# Patient Record
Sex: MALE | Race: BLACK OR AFRICAN AMERICAN | Employment: OTHER | URBAN - METROPOLITAN AREA
[De-identification: names, ages, dates, MRNs, and addresses within clinical notes are randomized per-mention and may not be internally consistent; named-entity substitution may affect disease eponyms.]

---

## 2018-06-15 ENCOUNTER — OFFICE VISIT (OUTPATIENT)
Dept: HEMATOLOGY | Age: 68
End: 2018-06-15

## 2018-06-15 VITALS
OXYGEN SATURATION: 7 % | DIASTOLIC BLOOD PRESSURE: 82 MMHG | WEIGHT: 252 LBS | TEMPERATURE: 96.4 F | HEART RATE: 69 BPM | SYSTOLIC BLOOD PRESSURE: 135 MMHG

## 2018-06-15 DIAGNOSIS — B18.2 HEP C W/O COMA, CHRONIC (HCC): Primary | ICD-10-CM

## 2018-06-15 PROBLEM — I10 ESSENTIAL HYPERTENSION: Status: ACTIVE | Noted: 2018-06-15

## 2018-06-15 RX ORDER — ENALAPRIL MALEATE 5 MG/1
TABLET ORAL
Refills: 2 | COMMUNITY
Start: 2018-03-14 | End: 2021-08-03

## 2018-06-15 RX ORDER — CHLORTHALIDONE 25 MG/1
TABLET ORAL
Refills: 0 | COMMUNITY
Start: 2018-05-04 | End: 2021-08-03

## 2018-06-15 NOTE — PROGRESS NOTES
Chief Complaint   Patient presents with   174 [facility] Patient     Visit Vitals    /82 (BP 1 Location: Left arm, BP Patient Position: Sitting)    Pulse 69    Temp 96.4 °F (35.8 °C) (Tympanic)    Wt 252 lb (114.3 kg)    SpO2 (!) 7%     PHQ over the last two weeks 6/15/2018   Little interest or pleasure in doing things Not at all   Feeling down, depressed or hopeless Not at all   Total Score PHQ 2 0     Learning Assessment 6/15/2018   PRIMARY LEARNER Patient   BARRIERS PRIMARY LEARNER NONE   CO-LEARNER CAREGIVER No   PRIMARY LANGUAGE ENGLISH   LEARNER PREFERENCE PRIMARY LISTENING   ANSWERED BY patient   RELATIONSHIP SELF     Abuse Screening Questionnaire 6/15/2018   Do you ever feel afraid of your partner? N   Are you in a relationship with someone who physically or mentally threatens you? N   Is it safe for you to go home? Y     Fall Risk Assessment, last 12 mths 6/15/2018   Able to walk? Yes   Fall in past 12 months?  No

## 2018-06-15 NOTE — PROGRESS NOTES
70 Tatianna Marquez MD, [address], [place], [place]       Andrés Lopez, MAEVE Caballero, GREG Pina, [facility]   Reginald West, MAEVE Hernandez, MAEVE Arguello [facility] 136    at [address]    [address] [place] [address], [address] [facility], Jose Guadalupe [address] 22. [phone]    FAX: [address] [place] [address] Avenue    at [address], [address] [facility], [address]    [phone]    FAX: [phone]     Patient Care Team:  Glady Lesches, MD as PCP - General (Family Practice)  Problem List  Date Reviewed: 6/15/2018          Codes Class Noted    Chronic hepatitis C without hepatic coma [facility]) ICD-10-CM: B18.2  ICD-9-CM: 070.54  6/15/2018        Essential hypertension ICD-10-CM: I10  ICD-9-CM: 401.9  6/15/2018            The clinician listed above has asked me to see Serg Woodward in consultation regarding chronic HCV and its management. All medical records sent by the referring physicians were reviewed. The patient is a 76 y.o. Black male who was noted to have abnormalities in liver chemistries and subsequently tested positive for chronic HCV remotely. Risk factors for acquiring HCV are remote IV drug use. There was no history of acute icteric hepatitis at the time of these risk factors. Imaging of the liver was either not performed or the results are not available to me. An assessment of liver fibrosis with biopsy or elastography has not been performed. The patient has never received treatment for chronic HCV. The patient has no symptoms which can be attributed to the liver disorder. The patient has not experienced pain in the right side over the liver, yellowing of the eyes or skin or problems concentrating.      The patient completes all daily activities without any functional limitations. All of the issues listed in the Assessment and Plan were discussed with the patient. All questions were answered. The patient expressed a clear understanding of the above. 1901 [facility] 87 in 4 weeks for FibroScan and to review all data and determine the treatment plan. ASSESSMENT AND PLAN:    Chronic HCV   Chronic HCV of unclear severity. The most recent laboratory studies indicate the liver transaminases are elevated, alkaline phosphatase is normal, tests of hepatic synthetic and metabolic function are normal, total bilirubin is normal, albumin is normal and the platelet count is depressed. Based upon laboratory studies, the patient does not appear to have advanced liver disease or cirrhosis. Will perform laboratory testing to monitor liver function and degree of liver injury. This included BMP, hepatic panel, CBC with platelet count and INR. Will perform and/or review results of HCV viral load and HCV genotype to define the specific treatment and duration of treatment that will be required. Will perform serologic and virologic studies to assess for other causes of chronic liver disease. Will perform imaging of the liver with ultrasound. The need to perform an assessment of liver fibrosis was discussed with the patient. The FibroScan can assess liver fibrosis and determine if a patient has advanced fibrosis or cirrhosis without the need for liver biopsy. The FibroScan is currently available at [facility] [place]. This will be performed at the next office visit. The patient has not previously been treated for HCV. Discussed the treatment alternatives. The SVR/cure rate for HCV now exceeds 90% with just oral anti-viral therapy and no interferon injections or significant side effects for most patients with HCV. The specific treatment is dependent upon genotype, viral load and histology. Treatment of other medical problems in patients with chronic liver disease  The patient was directed to continue all current medications at the current dosages. There are no contraindications for the patient to take any medications that are necessary for treatment of other medical issues. The patient can take oral diabetic agents for treatment of diabetes and statins for treatment of hypercholesterolemia. This will not impact the patient's current liver disease. The patient does not consume alcohol daily. Normal doses of acetaminophen can be used for pain as needed. Normal doses of acetaminophen as recommended on the label are not hepatotoxic, even in patients with cirrhosis. Counseling for alcohol in patients with chronic liver disease  The patient has not consumed alcohol since 1990. Vaccinations   The need for vaccination against viral hepatitis A and B will be assessed with serologic and instituted as appropriate. Routine vaccinations against other bacterial and viral agents can be performed as indicated. Annual flu vaccination should be administered if indicated. Screening for Hepatocellular Carcinoma  HCC screening will be initiated if the patient is found to have cirrhosis. ALLERGIES  No Known Allergies    MEDICATIONS  Current Outpatient Prescriptions   Medication Sig    chlorthalidone (HYGROTEN) 25 mg tablet TK 1 T PO  QD    enalapril (VASOTEC) 5 mg tablet TK 1 T PO ONCE D     No current facility-administered medications for this visit. SYSTEM REVIEW NOT RELATED TO LIVER DISEASE OR REVIEWED ABOVE:  Constitution systems: Negative for fever, chills, weight gain, weight loss. Eyes: Negative for visual changes. ENT: Negative for sore throat, painful swallowing. Respiratory: Negative for cough, hemoptysis, SOB. Cardiology: Negative for chest pain, palpitations. GI:  Negative for constipation or diarrhea. : Negative for urinary frequency, dysuria, hematuria, nocturia. Skin: Negative for rash. Hematology: Negative for easy bruising, blood clots. Musculo-skeletal: Negative for back pain, muscle pain, weakness. Neurologic: Negative for headaches, dizziness, vertigo, memory problems not related to HE. Psychology: Negative for anxiety, depression. FAMILY HISTORY:  The father  of esophageal cancer. The mother has hypertension. There is no family history of liver disease. SOCIAL HISTORY:  The patient is . The patient has 3 children and grandchildren. The patient stopped using tobacco products in . The patient has previously consumed alcohol in excess. The patient has been abstinent from alcohol since . The patient retired from [employer] [address]:  Visit Vitals    /82 (BP 1 Location: Left arm, BP Patient Position: Sitting)    Pulse 69    Temp 96.4 °F (35.8 °C) (Tympanic)    Wt 252 lb (114.3 kg)    SpO2 (!) 7%     General: No acute distress. Eyes: Sclera anicteric. ENT: No oral lesions. Nodes: No adenopathy. Skin: No spider angiomata. No jaundice. No palmar erythema. Respiratory: Lungs clear to auscultation. Cardiovascular: Regular heart rate. No murmurs. No JVD. Abdomen: Soft non-tender. Liver size normal to percussion/palpation. Spleen not palpable. No obvious ascites. Extremities: No edema. No muscle wasting. No gross arthritic changes. Neurologic: Alert and oriented. Cranial nerves grossly intact. No asterixis. LABORATORY STUDIES:  From 10/2017  AST/ALT/ALP/T Bili/ALB: 53/53/74/0.9/3.9  WBC/HB/PLT/INR: 5.7/15.6/127  BUN/CREAT: 23/1.25    SEROLOGIES:  Not available or performed. Testing was performed today.     LIVER HISTOLOGY:  Not available or performed    ENDOSCOPIC PROCEDURES:  Not available or performed    RADIOLOGY:  Not available or performed    OTHER TESTING:  Not available or performed    Maximiliano Hall [facility]  Liver [facility], [place] [address] Dilip Giron 900 [facility] Jose Guadalupe Giron  22.  [phone]

## 2018-06-15 NOTE — MR AVS SNAPSHOT
1111 [facility] [phone] [address] [place] [address] 
[phone] Patient: Elma Ojeda. MRN:  JOSE [id number] Visit Information Date & Time Provider Department Dept. Phone Encounter #  
 6/15/2018  1:05 PM Adriel William, 3687 [facility] 219 [id number] Follow-up Instructions Return in about 4 weeks (around 7/13/2018) for FibroScan Adriel Echavarria. Upcoming Health Maintenance Date Due DTaP/Tdap/Td series (1 - Tdap) 1/28/1971 ZOSTER VACCINE AGE 60> 11/28/2009 FOBT Q 1 YEAR AGE 50-75 3/11/2010 GLAUCOMA SCREENING Q2Y 1/28/2015 Pneumococcal 65+ Low/Medium Risk (1 of 2 - PCV13) 1/28/2015 Influenza Age 5 to Adult 8/1/2018 Allergies as of 6/15/2018  Review Complete On: 6/15/2018 By: Beverly Snyder No Known Allergies Current Immunizations  Never Reviewed No immunizations on file. Not reviewed this visit You Were Diagnosed With   
  
 Codes Comments Hep C w/o coma, chronic (HCC)    -  Primary ICD-10-CM: B18.2 ICD-9-CM: 070.54 Vitals BP Pulse Temp Weight(growth percentile) SpO2 Smoking Status 135/82 (BP 1 Location: Left arm, BP Patient Position: Sitting) 69 96.4 °F (35.8 °C) (Tympanic) 252 lb (114.3 kg) (!) 7% Former Smoker Your Updated Medication List  
  
   
This list is accurate as of 6/15/18  1:26 PM.  Always use your most recent med list.  
  
  
  
  
 chlorthalidone 25 mg tablet Commonly known as:  HYGROTEN  
TK 1 T PO  QD  
  
 enalapril 5 mg tablet Commonly known as:  Leydi Guerrero TK 1 T PO ONCE D We Performed the Following CBC W/O DIFF [85579 CPT(R)] HCV RNA BY GRAHAM QL,RFLX TO QT [39569 CPT(R)] HEP A AB, TOTAL [id number] CPT(R)] HEP B SURFACE AB [id number] CPT(R)] HEP B SURFACE AG [id number] CPT(R)] HEP C GENOTYPE [90676 CPT(R)] HEPATIC FUNCTION PANEL [16000 CPT(R)] HEPATITIS B CORE AB, TOTAL [id number] CPT(R)] METABOLIC PANEL, BASIC [02073 CPT(R)] PROTHROMBIN TIME + INR [01606 CPT(R)] Follow-up Instructions Return in about 4 weeks (around 7/13/2018) for FibroScan [place]. To-Do List   
 06/27/2018 Imaging:  US ABD COMP   
  
 07/30/2018 1:30 PM  
  Appointment with Skylar Hsu. Sandra Gilliam NP at [place] [address] ([phone]) Introducing [facility]! [employer] introduces [employer] patient portal. Now you can access parts of your medical record, email your doctor's office, and request medication refills online. 1. In your internet browser, go to [email or web address] 2. Click on the First Time User? Click Here link in the Sign In box. You will see the New Member Sign Up page. 3. Enter your [employer] Access Code exactly as it appears below. You will not need to use this code after youve completed the sign-up process. If you do not sign up before the expiration date, you must request a new code. · [employer] Access Code: [email or web address] Expires: 9/13/2018  1:26 PM 
 
4. Enter the last four digits of your Social Security Number (xxxx) and Date of Birth (mm/dd/yyyy) as indicated and click Submit. You will be taken to the next sign-up page. 5. Create a [employer] ID. This will be your [employer] login ID and cannot be changed, so think of one that is secure and easy to remember. 6. Create a [employer] password. You can change your password at any time. 7. Enter your Password Reset Question and Answer. This can be used at a later time if you forget your password. 8. Enter your e-mail address. You will receive e-mail notification when new information is available in [address] 9. Click Sign Up. You can now view and download portions of your medical record. 10. Click the Download Summary menu link to download a portable copy of your medical information.  
 
If you have questions, please visit the Frequently Asked Questions section of the Medium. Remember, [employer] is NOT to be used for urgent needs. For medical emergencies, dial 911. Now available from your iPhone and Android! Please provide this summary of care documentation to your next provider. Your primary care clinician is listed as Napoleon Boyd. If you have any questions after today's visit, please call [phone].

## 2018-06-16 LAB
ALBUMIN SERPL-MCNC: 4 G/DL (ref 3.6–4.8)
ALP SERPL-CCNC: 72 IU/L (ref 39–117)
ALT SERPL-CCNC: 61 IU/L (ref 0–44)
AST SERPL-CCNC: 59 IU/L (ref 0–40)
BILIRUB DIRECT SERPL-MCNC: 0.39 MG/DL (ref 0–0.4)
BILIRUB SERPL-MCNC: 1 MG/DL (ref 0–1.2)
BUN SERPL-MCNC: 18 MG/DL (ref 8–27)
BUN/CREAT SERPL: 15 (ref 10–24)
CALCIUM SERPL-MCNC: 9.4 MG/DL (ref 8.6–10.2)
CHLORIDE SERPL-SCNC: 100 MMOL/L (ref 96–106)
CO2 SERPL-SCNC: 26 MMOL/L (ref 20–29)
CREAT SERPL-MCNC: 1.2 MG/DL (ref 0.76–1.27)
ERYTHROCYTE [DISTWIDTH] IN BLOOD BY AUTOMATED COUNT: 13.7 % (ref 12.3–15.4)
GFR SERPLBLD CREATININE-BSD FMLA CKD-EPI: 62 ML/MIN/1.73
GFR SERPLBLD CREATININE-BSD FMLA CKD-EPI: 71 ML/MIN/1.73
GLUCOSE SERPL-MCNC: 103 MG/DL (ref 65–99)
HAV AB SER QL IA: POSITIVE
HBV CORE AB SERPL QL IA: POSITIVE
HBV SURFACE AB SER QL: REACTIVE
HBV SURFACE AG SERPL QL IA: NEGATIVE
HCT VFR BLD AUTO: 44.9 % (ref 37.5–51)
HGB BLD-MCNC: 15.3 G/DL (ref 13–17.7)
INR PPP: 1.1 (ref 0.8–1.2)
MCH RBC QN AUTO: 30.3 PG (ref 26.6–33)
MCHC RBC AUTO-ENTMCNC: 34.1 G/DL (ref 31.5–35.7)
MCV RBC AUTO: 89 FL (ref 79–97)
PLATELET # BLD AUTO: 143 X10E3/UL (ref 150–379)
POTASSIUM SERPL-SCNC: 4 MMOL/L (ref 3.5–5.2)
PROT SERPL-MCNC: 7.9 G/DL (ref 6–8.5)
PROTHROMBIN TIME: 11.3 SEC (ref 9.1–12)
RBC # BLD AUTO: 5.05 X10E6/UL (ref 4.14–5.8)
SODIUM SERPL-SCNC: 140 MMOL/L (ref 134–144)
WBC # BLD AUTO: 5.6 X10E3/UL (ref 3.4–10.8)

## 2018-06-19 LAB
HCV GENTYP SERPL NAA+PROBE: NORMAL
HCV RNA SERPL NAA+PROBE-ACNC: NORMAL IU/ML
HCV RNA SERPL NAA+PROBE-LOG IU: 5.72 LOG10 IU/ML
HCV RNA SERPL QL NAA+PROBE: POSITIVE
PLEASE NOTE, 550474: NORMAL
TEST INFORMATION: NORMAL

## 2018-06-25 ENCOUNTER — TELEPHONE (OUTPATIENT)
Dept: HEMATOLOGY | Age: 68
End: 2018-06-25

## 2018-06-25 NOTE — TELEPHONE ENCOUNTER
Called the pt to r/s 7.30.18 appt no answer LVM for the pt to call back to r/s appt as soon as possible.  Letter sent

## 2018-07-05 ENCOUNTER — TELEPHONE (OUTPATIENT)
Dept: HEMATOLOGY | Age: 68
End: 2018-07-05

## 2018-07-05 NOTE — TELEPHONE ENCOUNTER
Called the pt to R/S appt 7.30.18 due to the provider being OOO.  No answer, LVM for the pt to adv in full detial

## 2018-07-25 ENCOUNTER — OFFICE VISIT (OUTPATIENT)
Dept: HEMATOLOGY | Age: 68
End: 2018-07-25

## 2018-07-25 VITALS
HEART RATE: 71 BPM | TEMPERATURE: 98 F | OXYGEN SATURATION: 99 % | WEIGHT: 259 LBS | SYSTOLIC BLOOD PRESSURE: 130 MMHG | DIASTOLIC BLOOD PRESSURE: 90 MMHG

## 2018-07-25 DIAGNOSIS — B18.2 CHRONIC HEPATITIS C WITHOUT HEPATIC COMA (HCC): Primary | ICD-10-CM

## 2018-07-25 PROBLEM — K74.69 OTHER CIRRHOSIS OF LIVER (HCC): Status: ACTIVE | Noted: 2018-07-25

## 2018-07-25 PROBLEM — K76.0 FATTY LIVER DISEASE, NONALCOHOLIC: Status: ACTIVE | Noted: 2018-07-25

## 2018-07-25 NOTE — PROGRESS NOTES
70 Tatianna Marquez MD, [address], [place], 5800 [facility]       Vielka Can, MAEVE Ndiaye, GREG Benjamin, [facility]   Juanito Sanchez, MAEVE Frankel, MAEVE Arguello [facility] 136    at [address], [address] [place]    1400 [facility], Jose Guadalupe  22.    [phone]    FAX: 126 [facility]    at [address], [address], [address]    [phone]    FAX: [phone]     Patient Care Team:  Oswaldo Cordero MD as PCP - General (Family Practice)  Problem List  Date Reviewed: 7/25/2018          Codes Class Noted    Other cirrhosis of liver [facility]) ICD-10-CM: K74.69  ICD-9-CM: 571.5  7/25/2018        Fatty liver disease, nonalcoholic FXC-31-KG: S34.9  ICD-9-CM: 571.8  7/25/2018        Chronic hepatitis C without hepatic coma ([facility] [address] ICD-10-CM: B18.2  ICD-9-CM: 070.54  6/15/2018        Essential hypertension ICD-10-CM: I10  ICD-9-CM: 401.9  6/15/2018            Tasha Gutiérrez. returns to the [address] for management of chronic HCV. The active problem list, all  pertinent past medical history, medications, liver histology and laboratory findings related to the liver disorder were reviewed with the patient. The patient is a 76 y.o. Black male who was noted to have abnormalities in liver chemistries and subsequently tested positive for chronic HCV remotely. Risk factors for acquiring HCV are remote IV drug use. There was no history of acute icteric hepatitis at the time of these risk factors. Imaging of the liver was either not performed or the results are not available to me. An assessment of liver fibrosis with elastography will be performed this visit. The patient has never received treatment for chronic HCV.       The patient has no symptoms which can be attributed to the liver disorder. The patient has not experienced pain in the right side over the liver, yellowing of the eyes or skin or problems concentrating. The patient completes all daily activities without any functional limitations. ASSESSMENT AND PLAN:    Chronic HCV   Chronic HCV with cirrhosis. He has not developed any complications of cirrhosis. The most recent laboratory studies indicate the liver transaminases are elevated, alkaline phosphatase is normal, tests of hepatic synthetic and metabolic function are normal, total bilirubin is normal, albumin is normal and the platelet count is depressed. The patient has GT 1A with viral load of 530,000. Will perform imaging of the liver with ultrasound. The patient has not previously been treated for HCV. Fatty liver  The patient was educated about the best diet to lose weight with fatty liver disease, one high in protein and low in carbohydrates. This was discussed in detail and a hand out was provided. Informed the patient this disease also can further injure his liver so it is important to lose weight. Treatment of other medical problems in patients with chronic liver disease  The patient was directed to continue all current medications at the current dosages. There are no contraindications for the patient to take any medications that are necessary for treatment of other medical issues. The patient can take oral diabetic agents for treatment of diabetes and statins for treatment of hypercholesterolemia. This will not impact the patient's current liver disease. The patient does not consume alcohol daily. Normal doses of acetaminophen can be used for pain as needed. Normal doses of acetaminophen as recommended on the label are not hepatotoxic, even in patients with cirrhosis.     Variceal screening  Will schedule for EGD to evaluate for esophageal varices and need to administer treatment to reduce risk of first variceal bleed. Counseling for alcohol in patients with chronic liver disease  The patient has not consumed alcohol since . Vaccinations   He does not need vaccination for hepatitis A and B. He has documented immunity. Routine vaccinations against other bacterial and viral agents can be performed as indicated. Annual flu vaccination should be administered if indicated. Screening for hepatocellular carcinoma  HCC screening will be started. Will order AFP at next visit when drawing other labs. Ultrasound is ordered. ALLERGIES  No Known Allergies    MEDICATIONS  Current Outpatient Prescriptions   Medication Sig    chlorthalidone (HYGROTEN) 25 mg tablet TK 1 T PO  QD    enalapril (VASOTEC) 5 mg tablet TK 1 T PO ONCE D     No current facility-administered medications for this visit. SYSTEM REVIEW NOT RELATED TO LIVER DISEASE OR REVIEWED ABOVE:  Constitution systems: Negative for fever, chills, weight gain, weight loss. Eyes: Negative for visual changes. ENT: Negative for sore throat, painful swallowing. Respiratory: Negative for cough, hemoptysis, SOB. Cardiology: Negative for chest pain, palpitations. GI:  Negative for constipation or diarrhea. : Negative for urinary frequency, dysuria, hematuria, nocturia. Skin: Negative for rash. Hematology: Negative for easy bruising, blood clots. Musculo-skeletal: Negative for back pain, muscle pain, weakness. Neurologic: Negative for headaches, dizziness, vertigo, memory problems not related to HE. Psychology: Negative for anxiety, depression. FAMILY HISTORY:  The father  of esophageal cancer. The mother has hypertension. There is no family history of liver disease. SOCIAL HISTORY:  The patient is . The patient has 3 children and grandchildren. The patient stopped using tobacco products in . The patient has previously consumed alcohol in excess.   The patient has been abstinent from alcohol since 1990. The patient retired from [address] [place] [address] Road:  Visit Vitals    /90 (BP 1 Location: Right arm, BP Patient Position: Sitting)    Pulse 71    Temp 98 °F (36.7 °C) (Tympanic)    Wt 259 lb (117.5 kg)    SpO2 99%     General: No acute distress. Obese. Eyes: Sclera anicteric. ENT: No oral lesions. Nodes: No adenopathy. Skin: No spider angiomata. No jaundice. No palmar erythema. Respiratory: Lungs clear to auscultation. Cardiovascular: Regular heart rate. No murmurs. No JVD. Abdomen: Soft non-tender. Liver size normal to percussion/palpation. Spleen not palpable. No obvious ascites. Extremities: No edema. No muscle wasting. No gross arthritic changes. Neurologic: Alert and oriented. Cranial nerves grossly intact. No asterixis. LABORATORY STUDIES:  From 10/2017  AST/ALT/ALP/T Bili/ALB: 53/53/74/0.9/3.9  WBC/HB/PLT/INR: 5.7/15.6/127  BUN/CREAT: 23/1.25    SEROLOGIES:  Serologies Latest Ref Rng & Units 6/15/2018   Hep A Ab, Total Negative Positive (A)   Hep B Surface Ag Negative Negative   Hep B Core Ab, Total Negative Positive (A)   Hep B Surface AB QL  Reactive   Hep C Genotype  1a   HCV RT-PCR, Quant IU/mL [id number]     LIVER HISTOLOGY:  7/2018. FibroScan performed at [address]. EkPa was 66.3. IQR/med 18%. The results suggested a fibrosis level of F4. CAP was 400, consistent with fatty liver disease. ENDOSCOPIC PROCEDURES:  Not available or performed    RADIOLOGY:  Not available or performed    OTHER TESTING:  Not available or performed    FOLLOW UP  All of the issues listed in the assessment and plan were discussed with the patient. All questions were answered. The patient expressed a clear understanding of the above. 1901 [facility] 87 4 weeks after starting medication.  The patient was told to call the office when he gets the medication to give us his start date and to schedule his 4 week follow up appointment.     JANN Hubbard-BC  Liver [place] [facility] [address] [place], [address] [address] Gu[address]upe [address]  22.  [phone]

## 2018-07-25 NOTE — PROGRESS NOTES
1. Have you been to the ER, urgent care clinic since your last visit? Hospitalized since your last visit? No    2. Have you seen or consulted any other health care providers outside of the [address] [place] [address] since your last visit? Include any pap smears or colon screening. No   Chief Complaint   Patient presents with    Follow-up     Fibroscan     Visit Vitals    /90 (BP 1 Location: Right arm, BP Patient Position: Sitting)    Pulse 71    Temp 98 °F (36.7 °C) (Tympanic)    Wt 259 lb (117.5 kg)    SpO2 99%     PHQ over the last two weeks 7/25/2018   Little interest or pleasure in doing things Not at all   Feeling down, depressed, irritable, or hopeless Not at all   Total Score PHQ 2 0     Learning Assessment 7/25/2018   PRIMARY LEARNER Patient   BARRIERS PRIMARY LEARNER NONE   CO-LEARNER CAREGIVER No   PRIMARY LANGUAGE ENGLISH   LEARNER PREFERENCE PRIMARY LISTENING   ANSWERED BY patient   RELATIONSHIP SELF     Abuse Screening Questionnaire 7/25/2018   Do you ever feel afraid of your partner? N   Are you in a relationship with someone who physically or mentally threatens you? N   Is it safe for you to go home? Y     Fall Risk Assessment, last 12 mths 7/25/2018   Able to walk? Yes   Fall in past 12 months?  No

## 2018-07-25 NOTE — MR AVS SNAPSHOT
1111 [facility] [phone] [address] [place] [address] 
[phone] Patient: Jazmin Lechuga. MRN:  [id number] Visit Information Date & Time Provider Department Dept. Phone Encounter #  
 7/25/2018  9:00 AM Kasia MEADOWS [facility], [facility] 219 [id number] Your Appointments 8/30/2018  2:00 PM  
PROCEDURE with MD Sonal Grover 75 ([address]) Appt Note: EGD  
 [address] [place] [phone] [facility] [address]  
59 [facility] [place] [address] Upcoming Health Maintenance Date Due DTaP/Tdap/Td series (1 - Tdap) 1/28/1971 ZOSTER VACCINE AGE 60> 11/28/2009 FOBT Q 1 YEAR AGE 50-75 3/11/2010 GLAUCOMA SCREENING Q2Y 1/28/2015 Pneumococcal 65+ Low/Medium Risk (1 of 2 - PCV13) 1/28/2015 MEDICARE YEARLY EXAM 6/15/2018 Influenza Age 5 to Adult 8/1/2018 Allergies as of 7/25/2018  Review Complete On: 7/25/2018 By: Dany Santiago No Known Allergies Current Immunizations  Never Reviewed No immunizations on file. Not reviewed this visit Vitals BP Pulse Temp Weight(growth percentile) SpO2 Smoking Status 130/90 (BP 1 Location: Right arm, BP Patient Position: Sitting) 71 98 °F (36.7 °C) (Tympanic) 259 lb (117.5 kg) 99% Former Smoker Your Updated Medication List  
  
   
This list is accurate as of 7/25/18  9:26 AM.  Always use your most recent med list.  
  
  
  
  
 chlorthalidone 25 mg tablet Commonly known as:  HYGROTEN  
TK 1 T PO  QD  
  
 enalapril 5 mg tablet Commonly known as:  Tonna George TK 1 T PO ONCE D To-Do List   
 08/06/2018 10:30 AM  
  Appointment with [facility] 2 at [facility] ([phone]) General  NPO DIET RESTICTIONS Please be NPO (nothing by mouth) for 6- 8 hours prior to procedure. GENERAL INSTRUCTIONS 1.  Bring any [facility] facility films/reports pertaining to the area being studied with you on the day of appointment. 2. A written order with a valid diagnosis and Physicians signature is required for all scheduled tests. 3. Check in at registration 30 minutes before your appointment time unless you were instructed to do otherwise. Introducing [facility] & HEALTH SERVICES! [employer] introduces [email or web address] patient portal. Now you can access parts of your medical record, email your doctor's office, and request medication refills online. 1. In your internet browser, go to [email or web address] 2. Click on the First Time User? Click Here link in the Sign In box. You will see the New Member Sign Up page. 3. Enter your [email or web address] Access Code exactly as it appears below. You will not need to use this code after youve completed the sign-up process. If you do not sign up before the expiration date, you must request a new code. · [email or web address] Access Code: [email or web address] Expires: 9/13/2018  1:26 PM 
 
4. Enter the last four digits of your Social Security Number (xxxx) and Date of Birth (mm/dd/yyyy) as indicated and click Submit. You will be taken to the next sign-up page. 5. Create a [email or web address] ID. This will be your [email or web address] login ID and cannot be changed, so think of one that is secure and easy to remember. 6. Create a [email or web address] password. You can change your password at any time. 7. Enter your Password Reset Question and Answer. This can be used at a later time if you forget your password. 8. Enter your e-mail address. You will receive e-mail notification when new information is available in [address] 9. Click Sign Up. You can now view and download portions of your medical record. 10. Click the Download Summary menu link to download a portable copy of your medical information. If you have questions, please visit the Frequently Asked Questions section of the [email or web address] website.  Remember, [email or web address] is NOT to be used for urgent needs. For medical emergencies, dial 911. Now available from your iPhone and Android! Please provide this summary of care documentation to your next provider. Your primary care clinician is listed as Fátima Solo. If you have any questions after today's visit, please call [phone].

## 2018-08-06 ENCOUNTER — HOSPITAL ENCOUNTER (OUTPATIENT)
Dept: ULTRASOUND IMAGING | Age: 68
Discharge: HOME | End: 2018-08-06
Attending: NURSE PRACTITIONER
Payer: MEDICARE

## 2018-08-06 DIAGNOSIS — B18.2 HEP C W/O COMA, CHRONIC (HCC): ICD-10-CM

## 2018-08-06 PROCEDURE — 76700 US EXAM ABDOM COMPLETE: CPT

## 2018-08-09 DIAGNOSIS — B18.2 CHRONIC HEPATITIS C WITHOUT HEPATIC COMA (HCC): Primary | ICD-10-CM

## 2018-08-09 RX ORDER — LEDIPASVIR AND SOFOSBUVIR 90; 400 MG/1; MG/1
1 TABLET, FILM COATED ORAL DAILY
Qty: 28 TAB | Refills: 2 | Status: SHIPPED | OUTPATIENT
Start: 2018-08-09 | End: 2019-01-10 | Stop reason: ALTCHOICE

## 2018-08-29 ENCOUNTER — ANESTHESIA EVENT (OUTPATIENT)
Dept: ENDOSCOPY | Age: 68
End: 2018-08-29
Payer: MEDICARE

## 2018-08-29 NOTE — ANESTHESIA PREPROCEDURE EVALUATION
Anesthetic History No history of anesthetic complications Review of Systems / Medical History Patient summary reviewed, nursing notes reviewed and pertinent labs reviewed Pulmonary Within defined limits Neuro/Psych Within defined limits Cardiovascular Hypertension: well controlled GI/Hepatic/Renal 
  
 
Hepatitis: type C Endo/Other Within defined limits Other Findings Physical Exam 
 
Airway Mallampati: II 
TM Distance: > 6 cm Neck ROM: normal range of motion Mouth opening: Normal 
 
 Cardiovascular Regular rate and rhythm,  S1 and S2 normal,  no murmur, click, rub, or gallop Dental 
 
Dentition: Full upper dentures and Full lower dentures Pulmonary Breath sounds clear to auscultation Abdominal 
GI exam deferred Other Findings Anesthetic Plan ASA: 3 Anesthesia type: MAC Induction: Intravenous Anesthetic plan and risks discussed with: Patient

## 2018-08-30 ENCOUNTER — ANESTHESIA (OUTPATIENT)
Dept: ENDOSCOPY | Age: 68
End: 2018-08-30
Payer: MEDICARE

## 2018-08-30 ENCOUNTER — HOSPITAL ENCOUNTER (OUTPATIENT)
Age: 68
Setting detail: OUTPATIENT SURGERY
Discharge: HOME | End: 2018-08-30
Attending: INTERNAL MEDICINE | Admitting: INTERNAL MEDICINE
Payer: MEDICARE

## 2018-08-30 VITALS
HEIGHT: 71 IN | OXYGEN SATURATION: 96 % | HEART RATE: 64 BPM | BODY MASS INDEX: 35.98 KG/M2 | TEMPERATURE: 98.5 F | DIASTOLIC BLOOD PRESSURE: 75 MMHG | RESPIRATION RATE: 14 BRPM | WEIGHT: 257 LBS | SYSTOLIC BLOOD PRESSURE: 129 MMHG

## 2018-08-30 PROCEDURE — 74011250636 HC RX REV CODE- 250/636

## 2018-08-30 PROCEDURE — 88305 TISSUE EXAM BY PATHOLOGIST: CPT | Performed by: INTERNAL MEDICINE

## 2018-08-30 PROCEDURE — 76060000033 HC ANESTHESIA 1 TO 1.5 HR: Performed by: INTERNAL MEDICINE

## 2018-08-30 PROCEDURE — 77030009426 HC FCPS BIOP ENDOSC BSC -B: Performed by: INTERNAL MEDICINE

## 2018-08-30 PROCEDURE — 76040000008: Performed by: INTERNAL MEDICINE

## 2018-08-30 RX ORDER — NALOXONE HYDROCHLORIDE 0.4 MG/ML
0.4 INJECTION, SOLUTION INTRAMUSCULAR; INTRAVENOUS; SUBCUTANEOUS
Status: DISCONTINUED | OUTPATIENT
Start: 2018-08-30 | End: 2018-08-30 | Stop reason: HOSPADM

## 2018-08-30 RX ORDER — SODIUM CHLORIDE 0.9 % (FLUSH) 0.9 %
5-10 SYRINGE (ML) INJECTION EVERY 8 HOURS
Status: DISCONTINUED | OUTPATIENT
Start: 2018-08-30 | End: 2018-08-30 | Stop reason: HOSPADM

## 2018-08-30 RX ORDER — DEXTROMETHORPHAN/PSEUDOEPHED 2.5-7.5/.8
1.2 DROPS ORAL
Status: DISCONTINUED | OUTPATIENT
Start: 2018-08-30 | End: 2018-08-30 | Stop reason: HOSPADM

## 2018-08-30 RX ORDER — SODIUM CHLORIDE 9 MG/ML
50 INJECTION, SOLUTION INTRAVENOUS CONTINUOUS
Status: DISCONTINUED | OUTPATIENT
Start: 2018-08-30 | End: 2018-08-30 | Stop reason: HOSPADM

## 2018-08-30 RX ORDER — FENTANYL CITRATE 50 UG/ML
50-200 INJECTION, SOLUTION INTRAMUSCULAR; INTRAVENOUS
Status: DISCONTINUED | OUTPATIENT
Start: 2018-08-30 | End: 2018-08-30 | Stop reason: HOSPADM

## 2018-08-30 RX ORDER — LIDOCAINE HYDROCHLORIDE 20 MG/ML
INJECTION, SOLUTION EPIDURAL; INFILTRATION; INTRACAUDAL; PERINEURAL AS NEEDED
Status: DISCONTINUED | OUTPATIENT
Start: 2018-08-30 | End: 2018-08-30 | Stop reason: HOSPADM

## 2018-08-30 RX ORDER — PROPOFOL 10 MG/ML
INJECTION, EMULSION INTRAVENOUS AS NEEDED
Status: DISCONTINUED | OUTPATIENT
Start: 2018-08-30 | End: 2018-08-30 | Stop reason: HOSPADM

## 2018-08-30 RX ORDER — ATROPINE SULFATE 0.1 MG/ML
0.5 INJECTION INTRAVENOUS
Status: DISCONTINUED | OUTPATIENT
Start: 2018-08-30 | End: 2018-08-30 | Stop reason: HOSPADM

## 2018-08-30 RX ORDER — MIDAZOLAM HYDROCHLORIDE 1 MG/ML
5-10 INJECTION, SOLUTION INTRAMUSCULAR; INTRAVENOUS
Status: DISCONTINUED | OUTPATIENT
Start: 2018-08-30 | End: 2018-08-30 | Stop reason: HOSPADM

## 2018-08-30 RX ORDER — EPINEPHRINE 0.1 MG/ML
1 INJECTION INTRACARDIAC; INTRAVENOUS
Status: DISCONTINUED | OUTPATIENT
Start: 2018-08-30 | End: 2018-08-30 | Stop reason: HOSPADM

## 2018-08-30 RX ORDER — FLUMAZENIL 0.1 MG/ML
0.2 INJECTION INTRAVENOUS
Status: DISCONTINUED | OUTPATIENT
Start: 2018-08-30 | End: 2018-08-30 | Stop reason: HOSPADM

## 2018-08-30 RX ORDER — SODIUM CHLORIDE 0.9 % (FLUSH) 0.9 %
5-10 SYRINGE (ML) INJECTION AS NEEDED
Status: DISCONTINUED | OUTPATIENT
Start: 2018-08-30 | End: 2018-08-30 | Stop reason: HOSPADM

## 2018-08-30 RX ORDER — SODIUM CHLORIDE 9 MG/ML
INJECTION, SOLUTION INTRAVENOUS
Status: DISCONTINUED | OUTPATIENT
Start: 2018-08-30 | End: 2018-08-30 | Stop reason: HOSPADM

## 2018-08-30 RX ADMIN — SODIUM CHLORIDE: 9 INJECTION, SOLUTION INTRAVENOUS at 14:46

## 2018-08-30 RX ADMIN — PROPOFOL 150 MG: 10 INJECTION, EMULSION INTRAVENOUS at 14:48

## 2018-08-30 RX ADMIN — PROPOFOL 50 MG: 10 INJECTION, EMULSION INTRAVENOUS at 14:51

## 2018-08-30 RX ADMIN — LIDOCAINE HYDROCHLORIDE 40 MG: 20 INJECTION, SOLUTION EPIDURAL; INFILTRATION; INTRACAUDAL; PERINEURAL at 14:48

## 2018-08-30 NOTE — IP AVS SNAPSHOT
2700 [facility] [address] [place] [address] 
[phone] Patient: Jazmin Lechuga. MRN: [id number] DBS:9/24/0555 About your hospitalization You were admitted on:  August 30, 2018 You last received care in the:  [facility] ENDOSCOPY You were discharged on:  August 30, 2018 Why you were hospitalized Your primary diagnosis was:  Not on File Follow-up Information Follow up With Details Comments Contact Info Abdon Davis MD   2 [facility] [address] [facility] [address] 
[phone] Discharge Orders None A check dima indicates which time of day the medication should be taken. My Medications CONTINUE taking these medications Instructions Each Dose to Equal  
 Morning Noon Evening Bedtime  
 chlorthalidone 25 mg tablet Commonly known as:  Daja Carry Your last dose was: Your next dose is:    
   
   
 TK 1 T PO  QD  
     
   
   
   
  
 enalapril 5 mg tablet Commonly known as:  Andres Vazquez Your last dose was: Your next dose is:    
   
   
 TK 1 T PO ONCE D  
     
   
   
   
  
 ledipasvir-sofosbuvir  mg Tab Commonly known as:  Bhavani Aparicio Your last dose was: Your next dose is: Take 1 Tab by mouth daily. Indications: CHRONIC HEPATITIS C - GENOTYPE 1, A-A, naive, compensated cirrhotic (CPS 5, class A) 1 Tab Discharge Instructions [address] 8579 [facility] Melo Treviño MD, Babita Reed, [facility] MAEVE Hardwick PA-C [address]   MAEVE Simon NP [address] [facility] [place] 136 
  at [address], [address] [facility], [facility] 22. 
  [phone] FAX: [address] [place] [address] 
  [address], [address] 98 Angely Mckoy, [address] 
  [phone] FAX: [phone] ENDOSCOPY DISCHARGE INSTRUCTIONS Susie Johnsonmarianoavni. 
1950 Date: 8/30/2018 DISCOMFORT: 
Use lozenges or warm salt water gargle for sore thoat Apply warm compress to IV site if red. If redness or soreness persists call the office. You may experience gas and bloating. Walking and belching will help relieve this. You may experience chest discomfort or pressure especially if banding of esophageal varices was performed. DIET: 
You may resume your normal diet. ACTIVITY: 
Spend the remainder of the day resting. Avoid any strenuous activity. You may not operate a vehicle for 12 hours. You may not engage in an occupation involving machinery or appliances for rest of today. Avoid making any critical or financial decisions for at least 24 hour. Call the [facility] [employer] [facility] if you have any of the following: 
Increasing chest or abdominal pain, nausea, vomiting, vomiting blood, abdominal distension or swelling, fever or chills, bloody discharge from nose or mouth or shortness of breath. Follow-up Instructions: 
Call Dr. Keely Flores for any questions or problems at the phone number listed above. If a biopsy was performed, you will be contacted by the office staff or Dr Keely Flores within 1 week. If you have not heard from us by then you may call the office at the phone number listed above to inquire about the results. ENDOSCOPY FINDINGS: 
Your endoscopy demonstrated irritation in the stomach. A biopsy of the stomach was taken. DISCHARGE SUMMARY from the Nurse: The following personal items collected during your admission are returned to you:  
Dental Appliance: Dental Appliances: None Vision: Visual Aid: Glasses Hearing Aid:   
Jewelry:   
Clothing:   
Other Valuables: Valuables sent to safe: MyChart Activation Thank you for requesting access to [employer]. Please follow the instructions below to securely access and download your online medical record. [employer] allows you to send messages to your doctor, view your test results, renew your prescriptions, schedule appointments, and more. How Do I Sign Up? 1. In your internet browser, go to [email or web address] 
2. Click on the First Time User? Click Here link in the Sign In box. You will be redirect to the New Member Sign Up page. 3. Enter your [employer] Access Code exactly as it appears below. You will not need to use this code after youve completed the sign-up process. If you do not sign up before the expiration date, you must request a new code. [employer] Access Code: [email or web address] Expires: 2018  1:26 PM (This is the date your [employer] access code will ) 4. Enter the last four digits of your Social Security Number (xxxx) and Date of Birth (mm/dd/yyyy) as indicated and click Submit. You will be taken to the next sign-up page. 5. Create a [employer] ID. This will be your [employer] login ID and cannot be changed, so think of one that is secure and easy to remember. 6. Create a [employer] password. You can change your password at any time. 7. Enter your Password Reset Question and Answer. This can be used at a later time if you forget your password. 8. Enter your e-mail address. You will receive e-mail notification when new information is available in [address] 9. Click Sign Up. You can now view and download portions of your medical record. 10. Click the Download Summary menu link to download a portable copy of your medical information. Additional Information If you have questions, please visit the Frequently Asked Questions section of the [employer] website at [email or web address] Caption Data. [email or web address] Remember, [employer] is NOT to be used for urgent needs. For medical emergencies, dial 911. Gastritis: Care Instructions Your Care Instructions Gastritis is a sore and upset stomach. It happens when something irritates the stomach lining. Many things can cause it. These include an infection such as the flu or something you ate or drank. Medicines or a sore on the lining of the stomach (ulcer) also can cause it. Your belly may bloat and ache. You may belch, vomit, and feel sick to your stomach. You should be able to relieve the problem by taking medicine. And it may help to change your diet. If gastritis lasts, your doctor may prescribe medicine. Follow-up care is a key part of your treatment and safety. Be sure to make and go to all appointments, and call your doctor if you are having problems. It's also a good idea to know your test results and keep a list of the medicines you take. How can you care for yourself at home? · If your doctor prescribed antibiotics, take them as directed. Do not stop taking them just because you feel better. You need to take the full course of antibiotics. · Be safe with medicines. If your doctor prescribed medicine to decrease stomach acid, take it as directed. Call your doctor if you think you are having a problem with your medicine. · Do not take any other medicine, including over-the-counter pain relievers, without talking to your doctor first. 
· If your doctor recommends over-the-counter medicine to reduce stomach acid, such as Pepcid AC, Prilosec, Tagamet HB, or Zantac 75, follow the directions on the label. · Drink plenty of fluids (enough so that your urine is light yellow or clear like water) to prevent dehydration. Choose water and other caffeine-free clear liquids. If you have kidney, heart, or liver disease and have to limit fluids, talk with your doctor before you increase the amount of fluids you drink. · Limit how much alcohol you drink. · Avoid coffee, tea, cola drinks, chocolate, and other foods with caffeine. They increase stomach acid. When should you call for help? Call 911 anytime you think you may need emergency care. For example, call if: 
  · You vomit blood or what looks like coffee grounds.  
  · You pass maroon or very bloody stools.  
 Call your doctor now or seek immediate medical care if: 
  · You start breathing fast and have not produced urine in the last 8 hours.  
  · You cannot keep fluids down.  
 Watch closely for changes in your health, and be sure to contact your doctor if: 
  · You do not get better as expected. Where can you learn more? Go to http://vilma-gray.info/. Enter [id number] in the search box to learn more about \"Gastritis: Care Instructions. \" Current as of: May 12, 2017 Content Version: 11.7 © 3431-6368 OrderMyGear. Care instructions adapted under license by [employer] (which disclaims liability or warranty for this information). If you have questions about a medical condition or this instruction, always ask your healthcare professional. [address] any warranty or liability for your use of this information. Introducing [facility] & HEALTH SERVICES! [facility] introduces Synthetic Genomics patient portal. Now you can access parts of your medical record, email your doctor's office, and request medication refills online. 1. In your internet browser, go to [email or web address] 2. Click on the First Time User? Click Here link in the Sign In box. You will see the New Member Sign Up page. 3. Enter your S[employer] Access Code exactly as it appears below. You will not need to use this code after youve completed the sign-up process. If you do not sign up before the expiration date, you must request a new code. · [employer] Access Code: [email or web address] Expires: 9/13/2018  1:26 PM 
 
4. Enter the last four digits of your Social Security Number (xxxx) and Date of Birth (mm/dd/yyyy) as indicated and click Submit.  You will be taken to the next sign-up page. 5. Create a IZP Technologiest ID. This will be your ClassPass login ID and cannot be changed, so think of one that is secure and easy to remember. 6. Create a IZP Technologiest password. You can change your password at any time. 7. Enter your Password Reset Question and Answer. This can be used at a later time if you forget your password. 8. Enter your e-mail address. You will receive e-mail notification when new information is available in [address] 9. Click Sign Up. You can now view and download portions of your medical record. 10. Click the Download Summary menu link to download a portable copy of your medical information. If you have questions, please visit the Frequently Asked Questions section of the ClassPass website. Remember, ClassPass is NOT to be used for urgent needs. For medical emergencies, dial 911. Now available from your iPhone and Android! Introducing Pato Daley As a [facility] patient, I wanted to make you aware of our electronic visit tool called Pato Faizanpalmira. PrecisionDemandpablito 24/7 allows you to connect within minutes with a medical provider 24 hours a day, seven days a week via a mobile device or tablet or logging into a secure website from your computer. You can access Pato Daley from anywhere in the [place]. A virtual visit might be right for you when you have a simple condition and feel like you just dont want to get out of bed, or cant get away from work for an appointment, when your regular PrecisionDemandpablito provider is not available (evenings, weekends or holidays), or when youre out of town and need minor care. Electronic visits cost only $49 and if the Simple Beat 24/7 provider determines a prescription is needed to treat your condition, one can be electronically transmitted to a nearby pharmacy*. Please take a moment to enroll today if you have not already done so.   The enrollment process is free and takes just a few minutes. To enroll, please download the Agency Systems 24/7 kathrin to your tablet or phone, or visit [email or web address] to enroll on your computer. And, as an [address] [place] [address] patient with a Doujiao account, the results of your visits will be scanned into your electronic medical record and your primary care provider will be able to view the scanned results. We urge you to continue to see your regular Agency Systems provider for your ongoing medical care. And while your primary care provider may not be the one available when you seek a Yuuguu virtual visit, the peace of mind you get from getting a real diagnosis real time can be priceless. For more information on Yuuguu, view our Frequently Asked Questions (FAQs) at [email or web address]. Sincerely, 
 
Mohan Blank MD 
Chief Medical Officer Agnieszka Tidwell *:  certain medications cannot be prescribed via Yuuguu Providers Seen During Your Hospitalization Provider Specialty Primary office phone Elise Tavares MD Hepatology [phone] Your Primary Care Physician (PCP) Primary Care Physician Office Phone Office Fax Farhan Valle [phone] You are allergic to the following No active allergies Recent Documentation Height Weight BMI Smoking Status 1.803 m 116.6 kg 35.84 kg/m2 Former Smoker Emergency Contacts Name Discharge Info Relation Home Work Mobile Rayna Chaudhary DISCHARGE CAREGIVER [3] Spouse [3] [phone] Patient Belongings The following personal items are in your possession at time of discharge: 
  Dental Appliances: None  Visual Aid: Glasses Please provide this summary of care documentation to your next provider.  
  
  
 
  
Signatures-by signing, you are acknowledging that this After Visit Summary has been reviewed with you and you have received a copy. Patient Signature:  ____________________________________________________________ Date:  ____________________________________________________________  
  
Burlene Saeed Provider Signature:  ____________________________________________________________ Date:  ____________________________________________________________

## 2018-08-30 NOTE — H&P
[address] 6385 [facility] [facility] Melo Treviño MD, Babita Reed, [facility] MA[facility] Hardwick PA-C [facility]   MAEVE Simon NP [address] [facility] [place] [address] 
  at [facility] 
  217 [facility], [address] [address]  22. 
  [phone] FAX: [address] [place] [address] Avenue 
  at [facility] 
  [facility], [address] [facility], [address] 
  [phone] FAX: [phone] PRE-PROCEDURE NOTE - EGD H and P from last office visit reviewed. Allergies reviewed. Out-patient medicaton list reviewed. Patient Active Problem List  
Diagnosis Code  Chronic hepatitis C without hepatic coma (HCC) B18.2  Essential hypertension I10  
 Other cirrhosis of liver ([facility] K74.69  Fatty liver disease, nonalcoholic B63.5 No Known Allergies No current facility-administered medications on file prior to encounter. Current Outpatient Prescriptions on File Prior to Encounter Medication Sig Dispense Refill  chlorthalidone (HYGROTEN) 25 mg tablet TK 1 T PO  QD  0  
 enalapril (VASOTEC) 5 mg tablet TK 1 T PO ONCE D  2  
 ledipasvir-sofosbuvir (HARVONI)  mg tab Take 1 Tab by mouth daily. Indications: CHRONIC HEPATITIS C - GENOTYPE 1, A-A, naive, compensated cirrhotic (CPS 5, class A) 28 Tab 2 For EGD to assess for esophageal and gastric varices. Plan to perform banding if indicated based upon variceal size and appearance. The risks of the procedure were discussed with the patient. These included reaction to anesthesia, pain, perforation and bleeding. All questions were answered. The patient wishes to proceed with the procedure. PHYSICAL EXAMINATION: 
VS: per nursing note General: No acute distress. Eyes: Sclera anicteric. ENT: No oral lesions. Thyroid normal. 
Nodes: No adenopathy. Skin: No spider angiomata. No jaundice. No palmar erythema. Respiratory: Lungs clear to auscultation. Cardiovascular: Regular heart rate. No murmurs. No JVD. Abdomen: Soft non-tender, liver size normal to percussion/palpation. Spleen not palpable. No obvious ascites. Extremities: No edema. No muscle wasting. No gross arthritic changes. Neurologic: Alert and oriented. Cranial nerves grossly intact. No asterixis. MOST RECENT LABORATORY STUDIES: 
Lab Results Component Value Date/Time WBC 5.6 06/15/2018 12:00 AM  
 HGB 15.3 06/15/2018 12:00 AM  
 HCT 44.9 06/15/2018 12:00 AM  
 PLATELET 459 (L) 61/30/4615 12:00 AM  
 MCV 89 06/15/2018 12:00 AM  
 
Lab Results Component Value Date/Time INR 1.1 06/15/2018 12:00 AM  
 INR 1.1 03/06/2009 04:50 AM  
 Prothrombin time 11.3 06/15/2018 12:00 AM  
 Prothrombin time 11.2 (H) 03/06/2009 04:50 AM  
 
 
ASSESSMENT AND PLAN: 
EGD to assess for esophageal and/or gastric varices. Conscious sedation with fentanyl and versed. MD Musa Engel[address] [place] [address] [place] [address], [address] [facility] 22. 
[phone]

## 2018-08-30 NOTE — IP AVS SNAPSHOT
[address] 
[phone] Patient: Kimmie Borrego. MRN: [id number] LEB:[birth year] A check dima indicates which time of day the medication should be taken. My Medications CONTINUE taking these medications Instructions Each Dose to Equal  
 Morning Noon Evening Bedtime  
 chlorthalidone 25 mg tablet Commonly known as:  Marquetta Phoenix Your last dose was: Your next dose is:    
   
   
 TK 1 T PO  QD  
     
   
   
   
  
 enalapril 5 mg tablet Commonly known as:  Javan Nissen Your last dose was: Your next dose is:    
   
   
 TK 1 T PO ONCE D  
     
   
   
   
  
 ledipasvir-sofosbuvir  mg Tab Commonly known as:  Isiah Urena Your last dose was: Your next dose is: Take 1 Tab by mouth daily. Indications: CHRONIC HEPATITIS C - GENOTYPE 1, A-A, naive, compensated cirrhotic (CPS 5, class A) 1 Tab

## 2018-08-30 NOTE — ROUTINE PROCESS
Jazmin Lechuga. 
1950 
[id number] Situation: 
Verbal report received from: Subhash Rivera RN Procedure: Procedure(s): ESOPHAGOGASTRODUODENOSCOPY (EGD) ESOPHAGOGASTRODUODENAL (EGD) BIOPSY Background: 
 
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 Mathew Meneses Assistant(s): Endoscopy Technician-1: Carolina Bryant IV Endoscopy RN-1: Macrina Guevara RN Specimens:  
ID Type Source Tests Collected by Time Destination 1 : Antral Biopsies Preservative   Melo Treviño MD 8/30/2018 1452 Pathology H. Pylori  no Assessment: 
Intra-procedure medications Anesthesia gave intra-procedure sedation and medications, see anesthesia flow sheet yes Intravenous fluids: NS@ [employer] [facility] Vital signs stable Abdominal assessment: round and soft Recommendation: 
Discharge patient per MD order. Family or Friend Permission to share finding with family or friend yes

## 2018-08-30 NOTE — PROGRESS NOTES

## 2018-08-30 NOTE — PROCEDURES
Ave Trivedi MD, Denzel Moon, [facility], [place]       Ledell Boas, GREG Olson, [facility]   MAEVE Grier, MAEVE Arguello [facility] 136    at [address], [facility] Jose Guadalupe Campa  22.    [phone]    FAX: [address] [place] [address], 300 [facility] [address]    [phone]    FAX: [phone]       UPPER ENDOSCOPY PROCEDURE NOTE    Andreas Garcia.  1950    INDICATION: Cirrhosis. Screening for esophageal varices with variceal ligation if  indicated. : Becca Brennan MD    ANESTHESIA/SEDATION: Propofol was administered by anesthesia      PROCEDURE DESCRIPTION:  Infomed consent was obtained from the patient for the procedure. All risks and benefits of the procedure explained. The patient was taken to the endoscopy suite and placed in the left lateral decubitus position. Following sequential administration of sedation to doses as indicated above the endoscope was inserted into the mouth and advanced under direct vision to the second portion of the duodenum. Careful inspection of upper gastrointestinal tract was made as the endoscope was inserted and withdrawn. Retroflexion of the endoscope to view of the cardia of the stomach was performed. The findings and interventions are described below. FINDINGS:  Esophagus:    Normal.      Stomach:   Mild portal hypertensive gastropathy of the body of the stomach. Gastritis of the antrum,   No gastric varices identified. Duodenum:   Normal bulb and second portion    INTERVENTION:   2 biopsies were taken from the antrum.   The specimens were placed in preservative and transported to Pathology after the procedure. COMPLICATIONS: None. The patient tolerated the procedure well. EBL: Negligible. RECOMMENDATIONS:  Observe until discharge parameters are achieved. May resume previous diet. Repeat endoscopy to reassess varices and need for banding in 2 years. Follow-up [facility] office as scheduled.       Tito Mendez MD  8/30/2018  2:57 PM

## 2018-08-30 NOTE — DISCHARGE INSTRUCTIONS
70 Tatianna Marquez MD, [address], [facility], [place]       Anatoliy Miguel, MAEVE Kendrick, GREG Mancera, [facility]-BC   MAEVE Marin NP [address] [facility] 136    at [address], [address] Jose Guadalupe Campa  22. [phone]    FAX: [address], [address]    [phone]    FAX: [address] [facility].  1950  Date: 8/30/2018    DISCOMFORT:  Use lozenges or warm salt water gargle for sore thoat  Apply warm compress to IV site if red. If redness or soreness persists call the office. You may experience gas and bloating. Walking and belching will help relieve this. You may experience chest discomfort or pressure especially if banding of esophageal varices was performed. DIET:  You may resume your normal diet. ACTIVITY:  Spend the remainder of the day resting. Avoid any strenuous activity. You may not operate a vehicle for 12 hours. You may not engage in an occupation involving machinery or appliances for rest of today. Avoid making any critical or financial decisions for at least 24 hour. Call the [address] if you have any of the following:  Increasing chest or abdominal pain, nausea, vomiting, vomiting blood, abdominal distension or swelling, fever or chills, bloody discharge from nose or mouth or shortness of breath. Follow-up Instructions:  Call Dr. Bc Sheriff for any questions or problems at the phone number listed above. If a biopsy was performed, you will be contacted by the office staff or Dr Bc Sheriff within 1 week.    If you have not heard from us by then you may call the office at the phone number listed above to inquire about the results. ENDOSCOPY FINDINGS:  Your endoscopy demonstrated irritation in the stomach. A biopsy of the stomach was taken. DISCHARGE SUMMARY from the Nurse: The following personal items collected during your admission are returned to you:   Dental Appliance: Dental Appliances: None  Vision: Visual Aid: Glasses  Hearing Aid:    Jewelry:    Clothing:    Other Valuables:    Valuables sent to safe: MyChart Activation    Thank you for requesting access to Tele[employer]de. Please follow the instructions below to securely access and download your online medical record. Televerde allows you to send messages to your doctor, view your test results, renew your prescriptions, schedule appointments, and more. How Do I Sign Up? 1. In your internet browser, go to [email or web address]  2. Click on the First Time User? Click Here link in the Sign In box. You will be redirect to the New Member Sign Up page. 3. Enter your Televerde Access Code exactly as it appears below. You will not need to use this code after youve completed the sign-up process. If you do not sign up before the expiration date, you must request a new code. Televerde Access Code: [email or web address]  Expires: 2018  1:26 PM (This is the date your Televerde access code will )    4. Enter the last four digits of your Social Security Number (xxxx) and Date of Birth (mm/dd/yyyy) as indicated and click Submit. You will be taken to the next sign-up page. 5. Create a Televerde ID. This will be your Televerde login ID and cannot be changed, so think of one that is secure and easy to remember. 6. Create a Televerde password. You can change your password at any time. 7. Enter your Password Reset Question and Answer. This can be used at a later time if you forget your password. 8. Enter your e-mail address. You will receive e-mail notification when new information is available in [address] 9. Click Sign Up.  You can now view and download portions of your medical record. 10. Click the Download Summary menu link to download a portable copy of your medical information. Additional Information    If you have questions, please visit the Frequently Asked Questions section of the [employer] website at [email or web address] Remember, MyChart is NOT to be used for urgent needs. For medical emergencies, dial 911. Gastritis: Care Instructions  Your Care Instructions    Gastritis is a sore and upset stomach. It happens when something irritates the stomach lining. Many things can cause it. These include an infection such as the flu or something you ate or drank. Medicines or a sore on the lining of the stomach (ulcer) also can cause it. Your belly may bloat and ache. You may belch, vomit, and feel sick to your stomach. You should be able to relieve the problem by taking medicine. And it may help to change your diet. If gastritis lasts, your doctor may prescribe medicine. Follow-up care is a key part of your treatment and safety. Be sure to make and go to all appointments, and call your doctor if you are having problems. It's also a good idea to know your test results and keep a list of the medicines you take. How can you care for yourself at home? · If your doctor prescribed antibiotics, take them as directed. Do not stop taking them just because you feel better. You need to take the full course of antibiotics. · Be safe with medicines. If your doctor prescribed medicine to decrease stomach acid, take it as directed. Call your doctor if you think you are having a problem with your medicine. · Do not take any other medicine, including over-the-counter pain relievers, without talking to your doctor first.  · If your doctor recommends over-the-counter medicine to reduce stomach acid, such as Pepcid AC, Prilosec, Tagamet HB, or Zantac 75, follow the directions on the label.   · Drink plenty of fluids (enough so that your urine is light yellow or clear like water) to prevent dehydration. Choose water and other caffeine-free clear liquids. If you have kidney, heart, or liver disease and have to limit fluids, talk with your doctor before you increase the amount of fluids you drink. · Limit how much alcohol you drink. · Avoid coffee, tea, cola drinks, chocolate, and other foods with caffeine. They increase stomach acid. When should you call for help? Call 911 anytime you think you may need emergency care. For example, call if:    · You vomit blood or what looks like coffee grounds.     · You pass maroon or very bloody stools.    Call your doctor now or seek immediate medical care if:    · You start breathing fast and have not produced urine in the last 8 hours.     · You cannot keep fluids down.    Watch closely for changes in your health, and be sure to contact your doctor if:    · You do not get better as expected. Where can you learn more? Go to http://vilma-gray.info/. Enter [id number] in the search box to learn more about \"Gastritis: Care Instructions. \"  Current as of: May 12, 2017  Content Version: 11.7  © 5901-3681 [employer]. Care instructions adapted under license by [employer] (which disclaims liability or warranty for this information). If you have questions about a medical condition or this instruction, always ask your healthcare professional. [address] any warranty or liability for your use of this information.

## 2018-08-31 NOTE — ANESTHESIA POSTPROCEDURE EVALUATION
Post-Anesthesia Evaluation and Assessment Patient: Milton Hilton. MRN: [id number]  SSN: [id number] YOB: 1950  Age: 76 y.o. Sex: male Cardiovascular Function/Vital Signs Visit Vitals  /75  Pulse 64  Temp 36.9 °C (98.5 °F)  Resp 14  
 Ht 5' 11\" (1.803 m)  Wt 116.6 kg (257 lb)  SpO2 96%  BMI 35.84 kg/m2 Patient is status post MAC anesthesia for Procedure(s): ESOPHAGOGASTRODUODENOSCOPY (EGD) ESOPHAGOGASTRODUODENAL (EGD) BIOPSY. Nausea/Vomiting: None Postoperative hydration reviewed and adequate. Pain: 
Pain Scale 1: Numeric (0 - 10) (08/30/18 1520) Pain Intensity 1: 0 (08/30/18 1520) Managed Neurological Status: At baseline Mental Status and Level of Consciousness: Arousable Pulmonary Status:  
O2 Device: Room air (08/30/18 1520) Adequate oxygenation and airway patent Complications related to anesthesia: None Post-anesthesia assessment completed. No concerns Signed By: Barry Monteiro MD   
 August 30, 2018

## 2018-09-21 ENCOUNTER — HOSPITAL ENCOUNTER (OUTPATIENT)
Dept: MRI IMAGING | Age: 68
Discharge: HOME | End: 2018-09-21
Attending: FAMILY MEDICINE

## 2018-09-21 DIAGNOSIS — M62.81 LEFT-SIDED MUSCLE WEAKNESS: ICD-10-CM

## 2018-10-24 ENCOUNTER — HOSPITAL ENCOUNTER (OUTPATIENT)
Dept: MRI IMAGING | Age: 68
Discharge: HOME | End: 2018-10-24
Attending: NEUROLOGICAL SURGERY

## 2018-10-24 DIAGNOSIS — M54.12 CERVICAL RADICULOPATHY: ICD-10-CM

## 2018-12-07 ENCOUNTER — TELEPHONE (OUTPATIENT)
Dept: HEMATOLOGY | Age: 68
End: 2018-12-07

## 2018-12-07 NOTE — TELEPHONE ENCOUNTER
I called the patient to schedule him an appt for next available per Sidney Ramsay NP. Patient didn't answer. I left a VM for him to return my call.     Gaurang Wisdom

## 2018-12-18 ENCOUNTER — TELEPHONE (OUTPATIENT)
Dept: HEMATOLOGY | Age: 68
End: 2018-12-18

## 2019-01-10 ENCOUNTER — OFFICE VISIT (OUTPATIENT)
Dept: HEMATOLOGY | Age: 69
End: 2019-01-10

## 2019-01-10 VITALS
HEIGHT: 71 IN | OXYGEN SATURATION: 100 % | HEART RATE: 66 BPM | BODY MASS INDEX: 37.66 KG/M2 | WEIGHT: 269 LBS | TEMPERATURE: 98.8 F | SYSTOLIC BLOOD PRESSURE: 148 MMHG | DIASTOLIC BLOOD PRESSURE: 83 MMHG

## 2019-01-10 DIAGNOSIS — B18.2 CHRONIC HEPATITIS C WITHOUT HEPATIC COMA (HCC): ICD-10-CM

## 2019-01-10 DIAGNOSIS — K74.69 OTHER CIRRHOSIS OF LIVER (HCC): Primary | ICD-10-CM

## 2019-01-10 NOTE — PROGRESS NOTES
70 Tatianna Marquez MD, Carri Grijalva, Cite Renae Laith, [place]       Prasad Riddle, GREG Lawson, ACNP-BC   Storm Dumont, MAEVE Salter, MAEVE Arguello [facility] 136    at [address], [address] [place] [address], Jose Guadalupe  22.    [phone]    FAX: [address] [place] [address], [address], [address]    [phone]    FAX: [phone]     Patient Care Team:  Gracie Nazario MD as PCP - General (Family Practice)     Problem List  Date Reviewed: 7/25/2018          Codes Class Noted    Other cirrhosis of liver [facility]) ICD-10-CM: K74.69  ICD-9-CM: 571.5  7/25/2018        Fatty liver disease, nonalcoholic U-12-IZ: S69.0  ICD-9-CM: 571.8  7/25/2018        Chronic hepatitis C without hepatic coma ([facility] [address] ICD-10-CM: B18.2  ICD-9-CM: 070.54  6/15/2018        Essential hypertension ICD-10-CM: I10  ICD-9-CM: 401.9  6/15/2018            [facility]. returns to the [address] for management of chronic HCV. The active problem list, all  pertinent past medical history, medications, liver histology and laboratory findings related to the liver disorder were reviewed with the patient. The patient is a 76 y.o. Black adult who was noted to have abnormalities in liver chemistries and subsequently tested positive for chronic HCV remotely. Risk factors for acquiring HCV are remote IV drug use. There was no history of acute icteric hepatitis at the time of these risk factors. Imaging of the liver demonstrates chronic liver disease without masses or lesions. An assessment of liver fibrosis with elastography demonstrated cirrhosis. The patient has completed 12 weeks of therapy with Harvoni.  He states he did not know to follow up and has been busy going back and forth to [place] with his grandson who is getting ready to sign with a label. The patient has no symptoms which can be attributed to the liver disorder. The patient has not experienced pain in the right side over the liver, yellowing of the eyes or skin or problems concentrating. He is complaining of tight calves. The patient completes all daily activities without any functional limitations. ASSESSMENT AND PLAN:    Chronic HCV   Chronic HCV with cirrhosis. He has not developed any complications of cirrhosis. The most recent laboratory studies indicate the liver transaminases are elevated, alkaline phosphatase is normal, tests of hepatic synthetic and metabolic function are normal, total bilirubin is normal, albumin is normal and the platelet count is depressed. The patient has completed 12 weeks therapy with Harvoni. Fatty liver  The patient was educated about the best diet to lose weight with fatty liver disease, one high in protein and low in carbohydrates. We discussed this in detail again. He needs to focus on weight loss. I want him down 15 pounds before the next visit in 2 months. Leg cramps  Suggested trying tonic water. He states he had done that before and it helped. He has not been drinking it lately. Treatment of other medical problems in patients with chronic liver disease  The patient was directed to continue all current medications at the current dosages. There are no contraindications for the patient to take any medications that are necessary for treatment of other medical issues. The patient can take oral diabetic agents for treatment of diabetes and statins for treatment of hypercholesterolemia. This will not impact the patient's current liver disease. The patient does not consume alcohol daily. Normal doses of acetaminophen can be used for pain as needed.   Normal doses of acetaminophen as recommended on the label are not hepatotoxic, even in patients with cirrhosis. Variceal screening  EGD in 2018 with no varices. Repeat 2020. Counseling for alcohol in patients with chronic liver disease  The patient has not consumed alcohol since . Vaccinations   He does not need vaccination for hepatitis A and B. He has documented immunity. Routine vaccinations against other bacterial and viral agents can be performed as indicated. Annual flu vaccination should be administered if indicated. Screening for hepatocellular carcinoma  HCC screening will be started. Ordering CT scan to better clarify splenic cyst, will count as HCC screen. AFP ordered. ALLERGIES  No Known Allergies    MEDICATIONS  Current Outpatient Medications   Medication Sig    chlorthalidone (HYGROTEN) 25 mg tablet TK 1 T PO  QD    enalapril (VASOTEC) 5 mg tablet TK 1 T PO ONCE D     No current facility-administered medications for this visit. SYSTEM REVIEW NOT RELATED TO LIVER DISEASE OR REVIEWED ABOVE:  Constitution systems: Negative for fever, chills, weight gain, weight loss. Eyes: Negative for visual changes. ENT: Negative for sore throat, painful swallowing. Respiratory: Negative for cough, hemoptysis, SOB. Cardiology: Negative for chest pain, palpitations. GI:  Negative for constipation or diarrhea. : Negative for urinary frequency, dysuria, hematuria, nocturia. Skin: Negative for rash. Hematology: Negative for easy bruising, blood clots. Musculo-skeletal: Negative for back pain, muscle pain, weakness. Neurologic: Negative for headaches, dizziness, vertigo, memory problems not related to HE. Psychology: Negative for anxiety, depression. FAMILY HISTORY:  The father  of esophageal cancer. The mother has hypertension. There is no family history of liver disease. SOCIAL HISTORY:  The patient is . The patient has 3 children and grandchildren.     The patient stopped using tobacco products in 1990. The patient has previously consumed alcohol in excess. The patient has been abstinent from alcohol since 1990. The patient retired from [address] [place] [address] Road:  Visit Vitals  /83 (BP 1 Location: Left arm, BP Patient Position: Sitting)   Pulse 66   Temp 98.8 °F (37.1 °C) (Tympanic)   Ht 5' 11\" (1.803 m)   Wt 269 lb (122 kg)   SpO2 100%   BMI 37.52 kg/m²     General: No acute distress. Obese. Eyes: Sclera anicteric. ENT: No oral lesions. Nodes: No adenopathy. Skin: No spider angiomata. No jaundice. No palmar erythema. Respiratory: Lungs clear to auscultation. Cardiovascular: Regular heart rate. No murmurs. No JVD. Abdomen: Soft non-tender. Liver size normal to percussion/palpation. Spleen not palpable. No obvious ascites. Extremities: No edema. No muscle wasting. No gross arthritic changes. Neurologic: Alert and oriented. Cranial nerves grossly intact. No asterixis. LABORATORY STUDIES:  L[facility] [place] of [address] Units 6/15/2018   WBC 3.4 - 10.8 x10E3/uL 5.6   HGB 13.0 - 17.7 g/dL 15.3    - 379 x10E3/uL 143 (L)   INR 0.8 - 1.2 1.1   AST 0 - 40 IU/L 59 (H)   ALT 0 - 44 IU/L 61 (H)   Alk Phos 39 - 117 IU/L 72   Bili, Total 0.0 - 1.2 mg/dL 1.0   Bili, Direct 0.00 - 0.40 mg/dL 0.39   Albumin 3.6 - 4.8 g/dL 4.0   BUN 8 - 27 mg/dL 18   Creat 0.76 - 1.27 mg/dL 1.20   Na 134 - 144 mmol/L 140   K 3.5 - 5.2 mmol/L 4.0   Cl 96 - 106 mmol/L 100   CO2 20 - 29 mmol/L 26   Glucose 65 - 99 mg/dL 103 (H)     From 10/2017  AST/ALT/ALP/T Bili/ALB: 53/53/74/0.9/3.9  WBC/HB/PLT/INR: 5.7/15.6/127  BUN/CREAT: 23/1.25    SEROLOGIES:  Serologies Latest Ref Rng & Units 6/15/2018   Hep A Ab, Total Negative Positive (A)   Hep B Surface Ag Negative Negative   Hep B Core Ab, Total Negative Positive (A)   Hep B Surface AB QL  Reactive   Hep C Genotype  1a   HCV RT-PCR, Quant IU/mL [id number]     LIVER HISTOLOGY:  7/2018.   FibroScan performed at [place] [address] of [place]. EkPa was 66.3. IQR/med 18%. The results suggested a fibrosis level of F4. CAP was 400, consistent with fatty liver disease. ENDOSCOPIC PROCEDURES:  8/2018. EGD by MLS. Mild portal hypertensive gastropathy. No varices. Repeat 8/2020. RADIOLOGY:  8/6/2018. Abdominal ultrasound. A slightly echogenic and heterogeneous liver can be seen with nonspecific parenchymal liver disease. There is no focal liver mass.     2. Mild splenomegaly. A heterogeneous and possibly multicystic area within the peripheral spleen measuring 4.2 cm is nonspecific. CT with intravenous and oral contrast may be helpful for further evaluation. OTHER TESTING:  Not available or performed    FOLLOW UP  All of the issues listed in the assessment and plan were discussed with the patient. All questions were answered. The patient expressed a clear understanding of the above. Follow up in 2 months for SVR visit. He completed therapy about a month ago, he states.     Eliecer Hamilton, JANN-BC  [facility] [place] [facility] [address] [place], [address] Jose Guadalupe Campa  22.  [phone]

## 2019-01-10 NOTE — PROGRESS NOTES
1. Have you been to the ER, urgent care clinic since your last visit? Hospitalized since your last visit? No    2. Have you seen or consulted any other health care providers outside of the [address] [place] [address] since your last visit? Include any pap smears or colon screening. No     Chief Complaint   Patient presents with    Follow-up     Visit Vitals  /83 (BP 1 Location: Left arm, BP Patient Position: Sitting)   Pulse 66   Temp 98.8 °F (37.1 °C) (Tympanic)   Ht 5' 11\" (1.803 m)   Wt 269 lb (122 kg)   SpO2 100%   BMI 37.52 kg/m²     PHQ over the last two weeks 1/10/2019   Little interest or pleasure in doing things Not at all   Feeling down, depressed, irritable, or hopeless Not at all   Total Score PHQ 2 0     Fall Risk Assessment, last 12 mths 1/10/2019   Able to walk? Yes   Fall in past 12 months? No       Learning Assessment 1/10/2019   PRIMARY LEARNER Patient   BARRIERS PRIMARY LEARNER NONE   CO-LEARNER CAREGIVER No   PRIMARY LANGUAGE ENGLISH   LEARNER PREFERENCE PRIMARY LISTENING   ANSWERED BY patient   RELATIONSHIP SELF     Abuse Screening Questionnaire 1/10/2019   Do you ever feel afraid of your partner? N   Are you in a relationship with someone who physically or mentally threatens you? N   Is it safe for you to go home?  Sakshi Thompson

## 2019-01-11 LAB
AFP L3 MFR SERPL: 16.2 % (ref 0–9.9)
AFP SERPL-MCNC: 9.3 NG/ML (ref 0–8)
ALBUMIN SERPL-MCNC: 4.3 G/DL (ref 3.6–4.8)
ALP SERPL-CCNC: 72 IU/L (ref 39–117)
ALT SERPL-CCNC: 12 IU/L (ref 0–44)
AST SERPL-CCNC: 14 IU/L (ref 0–40)
BILIRUB DIRECT SERPL-MCNC: 0.14 MG/DL (ref 0–0.4)
BILIRUB SERPL-MCNC: 0.5 MG/DL (ref 0–1.2)
BUN SERPL-MCNC: 22 MG/DL (ref 8–27)
BUN/CREAT SERPL: 18 (ref 10–24)
CALCIUM SERPL-MCNC: 9.4 MG/DL (ref 8.6–10.2)
CHLORIDE SERPL-SCNC: 104 MMOL/L (ref 96–106)
CO2 SERPL-SCNC: 18 MMOL/L (ref 20–29)
CREAT SERPL-MCNC: 1.23 MG/DL (ref 0.76–1.27)
ERYTHROCYTE [DISTWIDTH] IN BLOOD BY AUTOMATED COUNT: 13.2 % (ref 12.3–15.4)
GLUCOSE SERPL-MCNC: 103 MG/DL (ref 65–99)
HCT VFR BLD AUTO: 44.1 % (ref 37.5–51)
HGB BLD-MCNC: 14.4 G/DL (ref 13–17.7)
INR PPP: 1.2 (ref 0.8–1.2)
MCH RBC QN AUTO: 29.1 PG (ref 26.6–33)
MCHC RBC AUTO-ENTMCNC: 32.7 G/DL (ref 31.5–35.7)
MCV RBC AUTO: 89 FL (ref 79–97)
PLATELET # BLD AUTO: 155 X10E3/UL (ref 150–379)
POTASSIUM SERPL-SCNC: 3.9 MMOL/L (ref 3.5–5.2)
PROT SERPL-MCNC: 7.7 G/DL (ref 6–8.5)
PROTHROMBIN TIME: 12.2 SEC (ref 9.1–12)
RBC # BLD AUTO: 4.95 X10E6/UL (ref 4.14–5.8)
SODIUM SERPL-SCNC: 140 MMOL/L (ref 134–144)
WBC # BLD AUTO: 6.9 X10E3/UL (ref 3.4–10.8)

## 2019-01-14 LAB — HCV RNA SERPL QL NAA+PROBE: NEGATIVE

## 2019-01-18 ENCOUNTER — HOSPITAL ENCOUNTER (OUTPATIENT)
Dept: CT IMAGING | Age: 69
Discharge: HOME | End: 2019-01-18
Attending: NURSE PRACTITIONER
Payer: MEDICARE

## 2019-01-18 DIAGNOSIS — K74.69 OTHER CIRRHOSIS OF LIVER (HCC): ICD-10-CM

## 2019-01-18 PROCEDURE — 74170 CT ABD WO CNTRST FLWD CNTRST: CPT

## 2019-01-18 PROCEDURE — 74011000258 HC RX REV CODE- 258: Performed by: RADIOLOGY

## 2019-01-18 PROCEDURE — 74011636320 HC RX REV CODE- 636/320: Performed by: RADIOLOGY

## 2019-01-18 RX ORDER — SODIUM CHLORIDE 0.9 % (FLUSH) 0.9 %
10 SYRINGE (ML) INJECTION
Status: COMPLETED | OUTPATIENT
Start: 2019-01-18 | End: 2019-01-18

## 2019-01-18 RX ADMIN — SODIUM CHLORIDE 100 ML: 900 INJECTION, SOLUTION INTRAVENOUS at 15:01

## 2019-01-18 RX ADMIN — IOPAMIDOL 100 ML: 755 INJECTION, SOLUTION INTRAVENOUS at 15:01

## 2019-01-18 RX ADMIN — Medication 10 ML: at 15:01

## 2019-02-04 DIAGNOSIS — R60.0 PEDAL EDEMA: Primary | ICD-10-CM

## 2019-02-04 RX ORDER — SPIRONOLACTONE 100 MG/1
100 TABLET, FILM COATED ORAL DAILY
Qty: 30 TAB | Refills: 5 | Status: SHIPPED | OUTPATIENT
Start: 2019-02-04 | End: 2019-02-04 | Stop reason: SDUPTHER

## 2019-02-04 RX ORDER — FUROSEMIDE 40 MG/1
40 TABLET ORAL DAILY
Qty: 30 TAB | Refills: 5 | Status: SHIPPED | OUTPATIENT
Start: 2019-02-04 | End: 2019-02-04 | Stop reason: SDUPTHER

## 2019-02-04 NOTE — PROGRESS NOTES
Called pt and advised of negative results. He is having feet swelling. Advised to follow up with PCP.

## 2019-06-14 ENCOUNTER — HOSPITAL ENCOUNTER (EMERGENCY)
Age: 69
Discharge: HOME | End: 2019-06-14
Attending: EMERGENCY MEDICINE
Payer: MEDICARE

## 2019-06-14 ENCOUNTER — APPOINTMENT (OUTPATIENT)
Dept: GENERAL RADIOLOGY | Age: 69
End: 2019-06-14
Payer: MEDICARE

## 2019-06-14 VITALS
DIASTOLIC BLOOD PRESSURE: 70 MMHG | HEART RATE: 100 BPM | WEIGHT: 274.25 LBS | SYSTOLIC BLOOD PRESSURE: 164 MMHG | TEMPERATURE: 97.9 F | HEIGHT: 71 IN | RESPIRATION RATE: 16 BRPM | OXYGEN SATURATION: 99 % | BODY MASS INDEX: 38.4 KG/M2

## 2019-06-14 DIAGNOSIS — Z87.19 PERSONAL HISTORY OF CIRRHOSIS: ICD-10-CM

## 2019-06-14 DIAGNOSIS — R60.0 BILATERAL LOWER EXTREMITY EDEMA: Primary | ICD-10-CM

## 2019-06-14 DIAGNOSIS — M54.2 CERVICALGIA: ICD-10-CM

## 2019-06-14 DIAGNOSIS — I10 ESSENTIAL HYPERTENSION: ICD-10-CM

## 2019-06-14 LAB
ANION GAP SERPL CALC-SCNC: 6 MMOL/L (ref 5–15)
BASOPHILS # BLD: 0 K/UL (ref 0–0.1)
BASOPHILS NFR BLD: 0 % (ref 0–1)
BNP SERPL-MCNC: 47 PG/ML
BUN SERPL-MCNC: 14 MG/DL (ref 6–20)
BUN/CREAT SERPL: 10 (ref 12–20)
CALCIUM SERPL-MCNC: 8.8 MG/DL (ref 8.5–10.1)
CHLORIDE SERPL-SCNC: 106 MMOL/L (ref 97–108)
CO2 SERPL-SCNC: 25 MMOL/L (ref 21–32)
CREAT SERPL-MCNC: 1.47 MG/DL (ref 0.7–1.3)
DIFFERENTIAL METHOD BLD: NORMAL
EOSINOPHIL # BLD: 0.2 K/UL (ref 0–0.4)
EOSINOPHIL NFR BLD: 2 % (ref 0–7)
ERYTHROCYTE [DISTWIDTH] IN BLOOD BY AUTOMATED COUNT: 12.9 % (ref 11.5–14.5)
GLUCOSE SERPL-MCNC: 98 MG/DL (ref 65–100)
HCT VFR BLD AUTO: 44.1 % (ref 36.6–50.3)
HGB BLD-MCNC: 14.9 G/DL (ref 12.1–17)
IMM GRANULOCYTES # BLD AUTO: 0 K/UL (ref 0–0.04)
IMM GRANULOCYTES NFR BLD AUTO: 0 % (ref 0–0.5)
LYMPHOCYTES # BLD: 2 K/UL (ref 0.8–3.5)
LYMPHOCYTES NFR BLD: 22 % (ref 12–49)
MCH RBC QN AUTO: 29.4 PG (ref 26–34)
MCHC RBC AUTO-ENTMCNC: 33.8 G/DL (ref 30–36.5)
MCV RBC AUTO: 87 FL (ref 80–99)
MONOCYTES # BLD: 1 K/UL (ref 0–1)
MONOCYTES NFR BLD: 11 % (ref 5–13)
NEUTS SEG # BLD: 5.9 K/UL (ref 1.8–8)
NEUTS SEG NFR BLD: 65 % (ref 32–75)
NRBC # BLD: 0 K/UL (ref 0–0.01)
NRBC BLD-RTO: 0 PER 100 WBC
PLATELET # BLD AUTO: 154 K/UL (ref 150–400)
PMV BLD AUTO: 11.4 FL (ref 8.9–12.9)
POTASSIUM SERPL-SCNC: 3.8 MMOL/L (ref 3.5–5.1)
RBC # BLD AUTO: 5.07 M/UL (ref 4.1–5.7)
SODIUM SERPL-SCNC: 137 MMOL/L (ref 136–145)
WBC # BLD AUTO: 9.2 K/UL (ref 4.1–11.1)

## 2019-06-14 PROCEDURE — 99282 EMERGENCY DEPT VISIT SF MDM: CPT

## 2019-06-14 PROCEDURE — 73630 X-RAY EXAM OF FOOT: CPT

## 2019-06-14 PROCEDURE — 80048 BASIC METABOLIC PNL TOTAL CA: CPT

## 2019-06-14 PROCEDURE — 83880 ASSAY OF NATRIURETIC PEPTIDE: CPT

## 2019-06-14 PROCEDURE — 36415 COLL VENOUS BLD VENIPUNCTURE: CPT

## 2019-06-14 PROCEDURE — 85025 COMPLETE CBC W/AUTO DIFF WBC: CPT

## 2019-06-14 RX ORDER — HYDROCODONE BITARTRATE AND ACETAMINOPHEN 5; 325 MG/1; MG/1
1 TABLET ORAL
Qty: 9 TAB | Refills: 0 | Status: SHIPPED | OUTPATIENT
Start: 2019-06-14 | End: 2019-06-17

## 2019-06-14 RX ORDER — CYCLOBENZAPRINE HCL 5 MG
5 TABLET ORAL
Qty: 20 TAB | Refills: 0 | Status: SHIPPED | OUTPATIENT
Start: 2019-06-14 | End: 2021-08-03

## 2019-06-14 NOTE — ED PROVIDER NOTES
EMERGENCY DEPARTMENT HISTORY AND PHYSICAL EXAM      Date: 6/14/2019  Patient Name: Gogo España. History of Presenting Illness     Chief Complaint   Patient presents with    Foot Pain     Bilateral feet swelling x1 month. Patient states the swelling comes and goes, but is not painful. Denies SOB. Ambulatory into triage. History Provided By: Patient    HPI: Gogo España., 71 y.o. adult presents ambulatory to the ED with cc of several months of 8 out of 10 intermittent aching swelling to both feet and lower legs that he finds no lasting improvement with elevation and 40mg furosemide every other day or so. He has cirrhosis of the liver due to chronic hepatitis C secondary to IVDU which he stopped about 15 years ago. He tells me he does not and never really has been a drinker (of alcohol). He denies chest pain or shortness of breath or dyspnea on exertion. He has not tried compression stockings but has read about them online. He has been seen for this problem by his Primary Care and was prescribed 40mg furosemide for pedal edema. Separately, he describes having a day or so of 8 out of 10 constant aching posterior neck pain that is worse with movement and not associated with fever or injury. He tells me he has a \"crick\" in his neck, probably from sleeping wrong. He denies numbness, weakness, radiating pain or other joint pain. Denies rash. There are no other complaints, changes, or physical findings at this time. PCP: Mc Tam MD    Current Outpatient Medications   Medication Sig Dispense Refill    HYDROcodone-acetaminophen (LORTAB 5-325) 5-325 mg per tablet Take 1 Tab by mouth every eight (8) hours as needed for Pain for up to 3 days. Max Daily Amount: 3 Tabs. 9 Tab 0    cyclobenzaprine (FLEXERIL) 5 mg tablet Take 1 Tab by mouth three (3) times daily as needed for Muscle Spasm(s).  20 Tab 0    furosemide (LASIX) 40 mg tablet TAKE 1 TABLET BY MOUTH EVERY DAY 90 Tab 3    spironolactone (ALDACTONE) 100 mg tablet TAKE 1 TABLET BY MOUTH EVERY DAY 90 Tab 3    chlorthalidone (HYGROTEN) 25 mg tablet TK 1 T PO  QD  0    enalapril (VASOTEC) 5 mg tablet TK 1 T PO ONCE D  2     Past History     Past Medical History:  Past Medical History:   Diagnosis Date    Arrhythmia     irregular heartbeat - resolved after using treadmill per pt - no medication    Chronic pain     left shoulder    Hypertension     Liver disease     hep C       Past Surgical History:  Past Surgical History:   Procedure Laterality Date    HX GI      colonoscopy/EGD    HX GI      11 inches of intestine removed - d/t polyp per pt       Family History:  History reviewed. No pertinent family history. Social History:  Social History     Tobacco Use    Smoking status: Former Smoker    Smokeless tobacco: Never Used    Tobacco comment: quit 13+ years ago    Substance Use Topics    Alcohol use: No    Drug use: No       Allergies:  No Known Allergies  Review of Systems   Review of Systems   Constitutional: Negative for fatigue and fever. HENT: Negative for ear pain and sore throat. Eyes: Negative for pain, redness and visual disturbance. Respiratory: Negative for cough and shortness of breath. Cardiovascular: Positive for leg swelling (bilateral feet and lower leg swelling). Negative for chest pain and palpitations. Gastrointestinal: Negative for abdominal pain, nausea and vomiting. Genitourinary: Negative for dysuria, frequency and urgency. Musculoskeletal: Positive for neck pain. Negative for back pain, gait problem and neck stiffness. Skin: Negative for rash and wound. Neurological: Negative for dizziness, weakness, light-headedness, numbness and headaches. Physical Exam   Physical Exam   Constitutional: He is oriented to person, place, and time. He appears well-developed and well-nourished. Non-toxic appearance. No distress. HENT:   Head: Normocephalic and atraumatic.  Head is without right periorbital erythema and without left periorbital erythema. Right Ear: External ear normal.   Left Ear: External ear normal.   Nose: Nose normal.   Mouth/Throat: Uvula is midline. No trismus in the jaw. Eyes: Pupils are equal, round, and reactive to light. Conjunctivae and EOM are normal. No scleral icterus. Neck: Normal range of motion and full passive range of motion without pain. Muscular tenderness present. NECK:  No bruising, redness or swelling  FAROM of all extremities  Bilateral paracervical muscular soreness; L>R   Cardiovascular: Normal rate, regular rhythm and normal heart sounds. Pulmonary/Chest: Effort normal and breath sounds normal. No accessory muscle usage. No tachypnea. No respiratory distress. He has no decreased breath sounds. He has no wheezes. Abdominal: Soft. There is no tenderness. There is no rigidity and no guarding. Musculoskeletal: Normal range of motion. Right lower leg: He exhibits swelling and edema. Left lower leg: He exhibits swelling and edema. Legs:  BILATERAL LOWER EXTREMITIES:  Symmetric bilateral lower extremity 2+ pitting edema to the calves including the feet. No break in the skin. No unusual redness. No palpable varicosities or cord like lesions. Neurological: He is alert and oriented to person, place, and time. He is not disoriented. No cranial nerve deficit or sensory deficit. GCS eye subscore is 4. GCS verbal subscore is 5. GCS motor subscore is 6. Skin: Skin is intact. No rash noted. Psychiatric: He has a normal mood and affect. His speech is normal.   Nursing note and vitals reviewed. Diagnostic Study Results     Labs -     Recent Results (from the past 12 hour(s))   CBC WITH AUTOMATED DIFF    Collection Time: 06/14/19  1:16 PM   Result Value Ref Range    WBC 9.2 4.1 - 11.1 K/uL    RBC 5.07 4. 10 - 5.70 M/uL    HGB 14.9 12.1 - 17.0 g/dL    HCT 44.1 36.6 - 50.3 %    MCV 87.0 80.0 - 99.0 FL    MCH 29.4 26.0 - 34.0 PG MCHC 33.8 30.0 - 36.5 g/dL    RDW 12.9 11.5 - 14.5 %    PLATELET 840 778 - 299 K/uL    MPV 11.4 8.9 - 12.9 FL    NRBC 0.0 0  WBC    ABSOLUTE NRBC 0.00 0.00 - 0.01 K/uL    NEUTROPHILS 65 32 - 75 %    LYMPHOCYTES 22 12 - 49 %    MONOCYTES 11 5 - 13 %    EOSINOPHILS 2 0 - 7 %    BASOPHILS 0 0 - 1 %    IMMATURE GRANULOCYTES 0 0.0 - 0.5 %    ABS. NEUTROPHILS 5.9 1.8 - 8.0 K/UL    ABS. LYMPHOCYTES 2.0 0.8 - 3.5 K/UL    ABS. MONOCYTES 1.0 0.0 - 1.0 K/UL    ABS. EOSINOPHILS 0.2 0.0 - 0.4 K/UL    ABS. BASOPHILS 0.0 0.0 - 0.1 K/UL    ABS. IMM. GRANS. 0.0 0.00 - 0.04 K/UL    DF AUTOMATED     METABOLIC PANEL, BASIC    Collection Time: 06/14/19  1:16 PM   Result Value Ref Range    Sodium 137 136 - 145 mmol/L    Potassium 3.8 3.5 - 5.1 mmol/L    Chloride 106 97 - 108 mmol/L    CO2 25 21 - 32 mmol/L    Anion gap 6 5 - 15 mmol/L    Glucose 98 65 - 100 mg/dL    BUN 14 6 - 20 MG/DL    Creatinine 1.47 (H) 0.70 - 1.30 MG/DL    BUN/Creatinine ratio 10 (L) 12 - 20      GFR est AA 58 (L) >60 ml/min/1.73m2    GFR est non-AA 47 (L) >60 ml/min/1.73m2    Calcium 8.8 8.5 - 10.1 MG/DL   NT-PRO BNP    Collection Time: 06/14/19  1:16 PM   Result Value Ref Range    NT pro-BNP 47 <125 PG/ML       Radiologic Studies -   XR FOOT RT MIN 3 V   Final Result   IMPRESSION: No acute osseous or articular abnormality. Mild dorsal soft tissue   swelling. XR FOOT LT MIN 3 V   Final Result   IMPRESSION: No acute osseous or articular abnormality. Dorsal soft tissue   swelling. CT Results  (Last 48 hours)    None        CXR Results  (Last 48 hours)    None        Medical Decision Making   I am the first provider for this patient. I reviewed the vital signs, available nursing notes, past medical history, past surgical history, family history and social history. Vital Signs-Reviewed the patient's vital signs.   Patient Vitals for the past 12 hrs:   Temp Pulse Resp BP SpO2   06/14/19 1306 97.9 °F (36.6 °C) 100 16 164/70 99 %       Pulse Oximetry Analysis - 99% on RA    Records Reviewed: Nursing Notes, Old Medical Records, Previous Radiology Studies and Previous Laboratory Studies    Provider Notes (Medical Decision Making):   DDx: pedal edema, cirrhosis of the liver, LETY, CHF, nephrotic syndrome and other causes of edema such as a lymphatic obstruction, increased interstitial oncotic pressure, venous obstruction or insufficiency, and others    Regarding his neck pain, there has been no injury and he has a reassuring exam.  Will defer additional testing. Plan as below. ED Course:   Initial assessment performed. The patients presenting problems have been discussed, and they are in agreement with the care plan formulated and outlined with them. I have encouraged them to ask questions as they arise throughout their visit. Disposition:  Discharge    PLAN:  1. Discharge Medication List as of 6/14/2019  3:03 PM      START taking these medications    Details   HYDROcodone-acetaminophen (LORTAB 5-325) 5-325 mg per tablet Take 1 Tab by mouth every eight (8) hours as needed for Pain for up to 3 days. Max Daily Amount: 3 Tabs., Print, Disp-9 Tab, R-0      cyclobenzaprine (FLEXERIL) 5 mg tablet Take 1 Tab by mouth three (3) times daily as needed for Muscle Spasm(s). , Print, Disp-20 Tab, R-0         CONTINUE these medications which have NOT CHANGED    Details   furosemide (LASIX) 40 mg tablet TAKE 1 TABLET BY MOUTH EVERY DAY, Normal**Patient requests 90 days supply**Disp-90 Tab, R-3      spironolactone (ALDACTONE) 100 mg tablet TAKE 1 TABLET BY MOUTH EVERY DAY, Normal**Patient requests 90 days supply**Disp-90 Tab, R-3      chlorthalidone (HYGROTEN) 25 mg tablet TK 1 T PO  QD, Historical Med, R-0      enalapril (VASOTEC) 5 mg tablet TK 1 T PO ONCE D, Historical Med, R-2           2.    Follow-up Information     Follow up With Specialties Details Why Contact Info    Maco Jackson MD [facility] Schedule an appointment as soon as possible for a visit PRIMARY CARE: call to schedule follow up [address] [place] [address]  [phone]          Return to ED if worse     Diagnosis     Clinical Impression:   1. Bilateral lower extremity edema    2. Cervicalgia    3. Personal history of cirrhosis    4.  Essential hypertension

## 2021-02-08 ENCOUNTER — HOSPITAL ENCOUNTER (OUTPATIENT)
Dept: GENERAL RADIOLOGY | Age: 71
Discharge: HOME | End: 2021-02-08
Payer: MEDICARE

## 2021-02-08 ENCOUNTER — TRANSCRIBE ORDER (OUTPATIENT)
Dept: REGISTRATION | Age: 71
End: 2021-02-08

## 2021-02-08 DIAGNOSIS — R06.02 SOB (SHORTNESS OF BREATH): ICD-10-CM

## 2021-02-08 DIAGNOSIS — R06.02 SOB (SHORTNESS OF BREATH): Primary | ICD-10-CM

## 2021-02-08 PROCEDURE — 71046 X-RAY EXAM CHEST 2 VIEWS: CPT

## 2021-02-10 ENCOUNTER — TRANSCRIBE ORDER (OUTPATIENT)
Dept: SCHEDULING | Age: 71
End: 2021-02-10

## 2021-02-10 DIAGNOSIS — Z87.09 HX OF PULMONARY EDEMA: ICD-10-CM

## 2021-02-10 DIAGNOSIS — R01.1 MURMUR: ICD-10-CM

## 2021-02-10 DIAGNOSIS — Z87.898 HISTORY OF EDEMA: Primary | ICD-10-CM

## 2021-02-15 ENCOUNTER — HOSPITAL ENCOUNTER (OUTPATIENT)
Dept: NON INVASIVE DIAGNOSTICS | Age: 71
Discharge: HOME | End: 2021-02-15
Attending: FAMILY MEDICINE
Payer: MEDICARE

## 2021-02-15 VITALS — WEIGHT: 273.37 LBS | BODY MASS INDEX: 38.27 KG/M2 | HEIGHT: 71 IN

## 2021-02-15 DIAGNOSIS — R01.1 MURMUR: ICD-10-CM

## 2021-02-15 DIAGNOSIS — Z87.898 HISTORY OF EDEMA: ICD-10-CM

## 2021-02-15 LAB
ECHO AO ROOT DIAM: 3.54 CM
ECHO AV AREA PEAK VELOCITY: 3.65 CM2
ECHO AV AREA/BSA PEAK VELOCITY: 1.5 CM2/M2
ECHO AV PEAK GRADIENT: 7.76 MMHG
ECHO AV PEAK VELOCITY: 139.29 CM/S
ECHO LA MAJOR AXIS: 4.11 CM
ECHO LA MINOR AXIS: 1.71 CM
ECHO LV INTERNAL DIMENSION DIASTOLIC: 4.18 CM (ref 4.2–5.9)
ECHO LV INTERNAL DIMENSION SYSTOLIC: 2.82 CM
ECHO LV IVSD: 1.21 CM (ref 0.6–1)
ECHO LV MASS 2D: 196.9 G (ref 88–224)
ECHO LV MASS INDEX 2D: 81.7 G/M2 (ref 49–115)
ECHO LV POSTERIOR WALL DIASTOLIC: 1.37 CM (ref 0.6–1)
ECHO LVOT DIAM: 2.23 CM
ECHO LVOT PEAK GRADIENT: 6.73 MMHG
ECHO LVOT PEAK VELOCITY: 129.75 CM/S
ECHO MV A VELOCITY: 104.4 CM/S
ECHO MV AREA PHT: 3.35 CM2
ECHO MV E DECELERATION TIME (DT): 226.7 MS
ECHO MV E VELOCITY: 83.12 CM/S
ECHO MV E/A RATIO: 0.8
ECHO MV PRESSURE HALF TIME (PHT): 65.74 MS
ECHO PV PEAK INSTANTANEOUS GRADIENT SYSTOLIC: 5.26 MMHG
ECHO RV INTERNAL DIMENSION: 3.76 CM
ECHO RV TAPSE: 2.54 CM (ref 1.5–2)
ECHO TV REGURGITANT MAX VELOCITY: 212.93 CM/S
ECHO TV REGURGITANT PEAK GRADIENT: 18.14 MMHG

## 2021-02-15 PROCEDURE — 93306 TTE W/DOPPLER COMPLETE: CPT

## 2021-04-27 ENCOUNTER — HOSPITAL ENCOUNTER (EMERGENCY)
Age: 71
Discharge: HOME | End: 2021-04-27
Attending: EMERGENCY MEDICINE
Payer: MEDICARE

## 2021-04-27 ENCOUNTER — APPOINTMENT (OUTPATIENT)
Dept: GENERAL RADIOLOGY | Age: 71
End: 2021-04-27
Attending: EMERGENCY MEDICINE
Payer: MEDICARE

## 2021-04-27 VITALS
HEART RATE: 65 BPM | DIASTOLIC BLOOD PRESSURE: 95 MMHG | BODY MASS INDEX: 38.5 KG/M2 | RESPIRATION RATE: 18 BRPM | TEMPERATURE: 98.7 F | OXYGEN SATURATION: 97 % | WEIGHT: 275 LBS | SYSTOLIC BLOOD PRESSURE: 173 MMHG | HEIGHT: 71 IN

## 2021-04-27 DIAGNOSIS — M17.11 ARTHRITIS OF RIGHT KNEE: ICD-10-CM
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
## 2021-04-27 DIAGNOSIS — M16.11 OSTEOARTHRITIS OF RIGHT HIP, UNSPECIFIED OSTEOARTHRITIS TYPE: ICD-10-CM

## 2021-04-27 DIAGNOSIS — S76.911A MUSCLE STRAIN OF RIGHT THIGH, INITIAL ENCOUNTER: ICD-10-CM

## 2021-04-27 PROCEDURE — 73562 X-RAY EXAM OF KNEE 3: CPT

## 2021-04-27 PROCEDURE — 99283 EMERGENCY DEPT VISIT LOW MDM: CPT

## 2021-04-27 PROCEDURE — 73502 X-RAY EXAM HIP UNI 2-3 VIEWS: CPT

## 2021-04-27 RX ORDER — KETOROLAC TROMETHAMINE 10 MG/1
10 TABLET, FILM COATED ORAL
Qty: 18 TAB | Refills: 0 | Status: SHIPPED | OUTPATIENT
Start: 2021-04-27 | End: 2021-08-03

## 2021-04-27 NOTE — ED TRIAGE NOTES
Pt reports he fell backwards 5 days ago after his R leg gave out. Pt is c/o R knee and upper leg pain. Denies LOC or hitting head.

## 2021-04-27 NOTE — ED PROVIDER NOTES
HPI patient is an obese with past medical history significant for chronic pain, hypertension and liver disease who presents to the ED stating that he took a fall in his home about 5 days ago and landed on his right side. He denies any LOC, dizziness or head injury. He states that his knee gave out while he was walking. Skin integrity is intact. There is no obvious bony or soft tissue deformity, rash, bruising or erythema Good neurovascular sensation. No obvious joint effusion or joint instability. Pain increases with weight bearing; flexion and extension. Patient walks steady with use of his cane. Past Medical History:   Diagnosis Date    Arrhythmia     irregular heartbeat - resolved after using treadmill per pt - no medication    Chronic pain     left shoulder    Hypertension     Liver disease     hep C       Past Surgical History:   Procedure Laterality Date    HX GI      colonoscopy/EGD    HX GI      11 inches of intestine removed - d/t polyp per pt         History reviewed. No pertinent family history.     Social History     Socioeconomic History    Marital status:      Spouse name: Not on file    Number of children: Not on file    Years of education: Not on file    Highest education level: Not on file   Occupational History    Not on file   Social Needs    Financial resource strain: Not on file    Food insecurity     Worry: Not on file     Inability: Not on file    Transportation needs     Medical: Not on file     Non-medical: Not on file   Tobacco Use    Smoking status: Former Smoker    Smokeless tobacco: Never Used    Tobacco comment: quit 13+ years ago    Substance and Sexual Activity    Alcohol use: No    Drug use: No    Sexual activity: Not on file   Lifestyle    Physical activity     Days per week: Not on file     Minutes per session: Not on file    Stress: Not on file   Relationships    Social connections     Talks on phone: Not on file     Gets together: Not on file Attends Jainism service: Not on file     Active member of club or organization: Not on file     Attends meetings of clubs or organizations: Not on file     Relationship status: Not on file    Intimate partner violence     Fear of current or ex partner: Not on file     Emotionally abused: Not on file     Physically abused: Not on file     Forced sexual activity: Not on file   Other Topics Concern    Not on file   Social History Narrative    Not on file         ALLERGIES: Patient has no known allergies. Review of Systems   Constitutional: Positive for activity change. Negative for appetite change, fever and unexpected weight change. HENT: Negative for congestion. Eyes: Negative for visual disturbance. Respiratory: Negative for cough and shortness of breath. Musculoskeletal: Positive for arthralgias and joint swelling. Negative for back pain, gait problem and myalgias. All other systems reviewed and are negative. Vitals:    04/27/21 1228   BP: (!) 185/116   Pulse: 60   Resp: 18   Temp: 98.5 °F (36.9 °C)   SpO2: 99%   Weight: 124.7 kg (275 lb)   Height: 5' 11\" (1.803 m)            Physical Exam  Vitals signs and nursing note reviewed. Constitutional:       General: He is not in acute distress. Appearance: Normal appearance. He is not ill-appearing, toxic-appearing or diaphoretic. Comments: Obese male; non smoker   HENT:      Head: Normocephalic. Neck:      Musculoskeletal: Normal range of motion and neck supple. Cardiovascular:      Rate and Rhythm: Normal rate and regular rhythm. Pulmonary:      Effort: Pulmonary effort is normal.      Breath sounds: Normal breath sounds. Abdominal:      General: Bowel sounds are normal.      Palpations: Abdomen is soft. Musculoskeletal: Normal range of motion. General: Tenderness and signs of injury present. Right lower leg: Edema present. Left lower leg: Edema present. Comments:  Both lower legs are slightly edematous and appear symmetrical; Skin integrity is intact. There is no obvious deformity. Good neurovascular sensation. No apparent tendon or nerve injury. Skin:     General: Skin is warm and dry. Findings: No rash. Neurological:      Mental Status: He is alert and oriented to person, place, and time. Psychiatric:         Behavior: Behavior normal.          MDM       Procedures    Xr Hip Rt W Or Wo Pelv 2-3 Vws    Result Date: 4/27/2021  1. No fracture. 2. At least mild bilateral hip osteoarthritis. Xr Knee Rt 3 V    Result Date: 4/27/2021  1. No fracture. 2. Chondrocalcinosis and mild patellofemoral arthritis, consider CPPD arthropathy. Reviewed RICE recommendations with the patient. Encourage close follow-up with orthopedic specialist for further evaluation and possible physical therapy. 1:34 PM  Patient's results and plan of care have been reviewed with him. Patient has verbally conveyed his understanding and agreement of his signs, symptoms, diagnosis, treatment and prognosis and additionally agrees to follow up as recommended or return to the Emergency Room should his condition change prior to follow-up. Discharge instructions have also been provided to the patient with some educational information regarding his diagnosis as well a list of reasons why he would want to return to the ER prior to his follow-up appointment should his condition change. Selma Allen NP

## 2021-07-20 ENCOUNTER — TELEPHONE (OUTPATIENT)
Dept: SLEEP MEDICINE | Age: 71
End: 2021-07-20

## 2021-07-20 NOTE — TELEPHONE ENCOUNTER
Spoke to patient on 07/20/2021 at 9:58am to schedule sleep consult visit per Dr. Alysa Leon. Patient will call the office  back to be scheduled.

## 2021-08-03 ENCOUNTER — OFFICE VISIT (OUTPATIENT)
Dept: CARDIOLOGY CLINIC | Age: 71
End: 2021-08-03
Payer: MEDICARE

## 2021-08-03 VITALS
TEMPERATURE: 98 F | WEIGHT: 281 LBS | OXYGEN SATURATION: 98 % | SYSTOLIC BLOOD PRESSURE: 135 MMHG | HEIGHT: 71 IN | RESPIRATION RATE: 18 BRPM | HEART RATE: 60 BPM | DIASTOLIC BLOOD PRESSURE: 68 MMHG | BODY MASS INDEX: 39.34 KG/M2

## 2021-08-03 DIAGNOSIS — K76.0 FATTY LIVER DISEASE, NONALCOHOLIC: ICD-10-CM

## 2021-08-03 DIAGNOSIS — M79.89 LEG SWELLING: ICD-10-CM

## 2021-08-03 DIAGNOSIS — K74.69 OTHER CIRRHOSIS OF LIVER (HCC): ICD-10-CM

## 2021-08-03 DIAGNOSIS — I10 ESSENTIAL HYPERTENSION: Primary | ICD-10-CM

## 2021-08-03 PROCEDURE — 99204 OFFICE O/P NEW MOD 45 MIN: CPT | Performed by: INTERNAL MEDICINE

## 2021-08-03 PROCEDURE — G8536 NO DOC ELDER MAL SCRN: HCPCS | Performed by: INTERNAL MEDICINE

## 2021-08-03 PROCEDURE — G8752 SYS BP LESS 140: HCPCS | Performed by: INTERNAL MEDICINE

## 2021-08-03 PROCEDURE — G8400 PT W/DXA NO RESULTS DOC: HCPCS | Performed by: INTERNAL MEDICINE

## 2021-08-03 PROCEDURE — 1090F PRES/ABSN URINE INCON ASSESS: CPT | Performed by: INTERNAL MEDICINE

## 2021-08-03 PROCEDURE — G8427 DOCREV CUR MEDS BY ELIG CLIN: HCPCS | Performed by: INTERNAL MEDICINE

## 2021-08-03 PROCEDURE — G8417 CALC BMI ABV UP PARAM F/U: HCPCS | Performed by: INTERNAL MEDICINE

## 2021-08-03 PROCEDURE — G8754 DIAS BP LESS 90: HCPCS | Performed by: INTERNAL MEDICINE

## 2021-08-03 PROCEDURE — G8510 SCR DEP NEG, NO PLAN REQD: HCPCS | Performed by: INTERNAL MEDICINE

## 2021-08-03 PROCEDURE — 1101F PT FALLS ASSESS-DOCD LE1/YR: CPT | Performed by: INTERNAL MEDICINE

## 2021-08-03 PROCEDURE — 3017F COLORECTAL CA SCREEN DOC REV: CPT | Performed by: INTERNAL MEDICINE

## 2021-08-03 RX ORDER — DILTIAZEM HYDROCHLORIDE 240 MG/1
CAPSULE, EXTENDED RELEASE ORAL
COMMUNITY

## 2021-08-03 RX ORDER — LOSARTAN POTASSIUM 100 MG/1
TABLET ORAL
COMMUNITY
Start: 2021-06-01

## 2021-08-03 RX ORDER — POTASSIUM CHLORIDE 20 MEQ/1
TABLET, EXTENDED RELEASE ORAL
COMMUNITY
Start: 2021-05-25

## 2021-08-03 NOTE — PROGRESS NOTES
8/3/2021 10:29 AM      Subjective:     Romayne Lips   Is a 71 YO M with pmhx HTN, Chronic Hep C (treated); referred by Dr Fadia dAler for further evaluation and treatment of bilateral leg swelling. States that bilateral leg swelling have been going on since last year but due to covid, held off seeing anybody. Recently started on lasix 40 mg daily by his pcp 2 weeks ago   And has greatly helped. In fact he started walking for exercise within the last week. No pain when walking. 1.  Have you ever had vein stripping surgery NO       2. Have you ever had vein injections? NO        3. Have you ever had a blood clot? NO       4. Have you ever had phlebitis? NO                                                                        Does anyone in your family have (or used to have) varicose veins, spider veins, leg ulcers or swollen legs? Father  NO  Mother NO  Brother(s) NO  Sister(s) NO  Other NO           1. Do you experience any of the following in your legs? Aching/pain? [] One leg [x] Both legs  Heaviness? [] One leg [x] Both legs  Tiredness/fatigue? [] One leg [] Both legs  Itching/burning? [] One leg [] Both legs  Swollen ankles? [] One leg [x] Both legs  Leg cramps? [] One leg [] Both legs  Restless legs? [] One leg [] Both legs  Throbbing? [] One leg [] Both legs  Other? 2.  Have your veins gotten worse in recent months? NO        3. Do you take any medication for pain (i.e., Advil, Motrin)  NO           4. Do you elevate your legs to relieve discomfort? YES        5. Do you exercise? Yes, he started walking for exercise in the last week    6. Do you wear prescription compression stockings? NO          7. Do you wear light support hose (i.e., [employer])? NO                    8.    Do you have any problem walking? NO                                 9.   What type of work do you do? retired        8.   Have you ever had any test(s) done on your veins? NO          11. Were you diagnosed with saphenous vein reflux? NO       reports chest pain, chest pressure/discomfort, dyspnea, palpitations, irregular heart beats, near-syncope, syncope, fatigue, orthopnea, paroxysmal nocturnal dyspnea, exertional chest pressure/discomfort, tachypnea, dyspnea on exertion. Visit Vitals  /68 (BP 1 Location: Right arm, BP Patient Position: At rest, BP Cuff Size: Large adult)   Pulse 60   Temp 98 °F (36.7 °C) (Skin)   Resp 18   Ht 5' 11\" (1.803 m)   Wt 281 lb (127.5 kg)   SpO2 98%   BMI 39.19 kg/m²       Current Outpatient Medications   Medication Sig    dilTIAZem ER (DILACOR XR) 240 mg capsule 1 cap(s)    losartan (COZAAR) 100 mg tablet TAKE 1 TABLET BY MOUTH EVERY DAY    potassium chloride (K-DUR, KLOR-CON) 20 mEq tablet TAKE 1 TABLET BY MOUTH EVERY DAY    furosemide (LASIX) 40 mg tablet TAKE 1 TABLET BY MOUTH EVERY DAY     No current facility-administered medications for this visit. Objective:      Visit Vitals  /68 (BP 1 Location: Right arm, BP Patient Position: At rest, BP Cuff Size: Large adult)   Pulse 60   Temp 98 °F (36.7 °C) (Skin)   Resp 18   Ht 5' 11\" (1.803 m)   Wt 281 lb (127.5 kg)   SpO2 98%   BMI 39.19 kg/m²       Data Review:   Labs:  No results found for this or any previous visit (from the past 24 hour(s)). Independent visualization of image, tracing, or specimen itself    Past Medical History:   Diagnosis Date    Arrhythmia     irregular heartbeat - resolved after using treadmill per pt - no medication    Chronic pain     left shoulder    Hypertension     Liver disease     hep C      Past Surgical History:   Procedure Laterality Date    HX GI      colonoscopy/EGD    HX GI      11 inches of intestine removed - d/t polyp per pt     No Known Allergies   No family history on file.    Social History     Socioeconomic History    Marital status:      Spouse name: Not on file    Number of children: Not on file    Years of education: Not on file    Highest education level: Not on file   Occupational History    Not on file   Tobacco Use    Smoking status: Former Smoker     Packs/day: 1.00     Years: 20.00     Pack years: 20.00     Types: Cigarettes     Quit date: 2002     Years since quittin.9    Smokeless tobacco: Never Used    Tobacco comment: quit 13+ years ago    Vaping Use    Vaping Use: Never used   Substance and Sexual Activity    Alcohol use: No    Drug use: Not Currently     Frequency: 3.0 times per week     Types: Heroin     Comment: quit 17 years ago    Sexual activity: Not on file   Other Topics Concern    Not on file   Social History Narrative    Not on file     Social Determinants of Health     Financial Resource Strain:     Difficulty of Paying Living Expenses:    Food Insecurity:     Worried About Running Out of Food in the Last Year:     [address] in the Last Year:    Transportation Needs:     Lack of Transportation (Medical):  Lack of Transportation (Non-Medical):    Physical Activity:     Days of Exercise per Week:     Minutes of Exercise per Session:    Stress:     Feeling of Stress :    Social Connections:     Frequency of Communication with Friends and Family:     Frequency of Social Gatherings with Friends and Family:     Attends [employer] Services:     Active Member of Clubs or Organizations:     Attends Club or Organization Meetings:     Marital Status:    Intimate Partner Violence:     Fear of Current or Ex-Partner:     Emotionally Abused:     Physically Abused:     Sexually Abused:        Review of Systems     General: Not Present- Anorexia, Chills, Dietary Changes, Fatigue, Fever, Medication Changes, Night Sweats, Weight Gain > 10lbs. and Weight Loss > 10lbs. .  Skin: Not Present- Bruising and Excessive Sweating. HEENT: Not Present- Headache, Visual Loss and Vertigo.   Respiratory: Not Present- Cough, Decreased Exercise Tolerance, Difficulty Breathing, Snoring and Wheezing. Cardiovascular: Not Present- Abnormal Blood Pressure, Chest Pain, Claudications, Difficulty Breathing On Exertion,  Fainting / Blacking Out, Irregular Heart Beat, Night Cramps, Orthopnea, Palpitations, Paroxysmal Nocturnal Dyspnea, Rapid Heart Rate, Shortness of Breath   Gastrointestinal: Not Present- Black, Tarry Stool, Bloody Stool, Diarrhea, Hematemesis, Rectal Bleeding and Vomiting. Musculoskeletal: Not Present- Muscle Pain and Muscle Weakness. Neurological: Not Present- Dizziness. Psychiatric: Not Present- Depression. Endocrine: Not Present- Cold Intolerance, Heat Intolerance and Thyroid Problems. Hematology: Not Present- Abnormal Bleeding, Anemia, Blood Clots and Easy Bruising. Physical Exam   The physical exam findings are as follows:       General   Mental Status - Alert. General Appearance - Not in acute distress. Chest and Lung Exam   Inspection: Accessory muscles - No use of accessory muscles in breathing. Auscultation:   Breath sounds: - Normal.      Cardiovascular   Inspection: Jugular vein - Bilateral - Inspection Normal.  Palpation/Percussion:   Apical Impulse: - Normal.  Auscultation: Rhythm - Regular. Heart Sounds - S1 WNL and S2 WNL. No S3 or S4. Murmurs & Other Heart Sounds: Auscultation of the heart reveals - No Murmurs. Carotid arteries - No Carotid bruit. Abdomen   Palpation/Percussion: Palpation and Percussion of the abdomen reveal - No Palpable abdominal masses. Auscultation: Auscultation of the abdomen reveals - Bowel sounds normal.      Neurologic   Mental Status: Affect - normal.  Motor: - Normal. Gait - Normal.      Peripheral Vascular   Upper Extremity: Inspection - Bilateral - No Cyanotic nailbeds or Digital clubbing. Lower Extremity:   Palpation:        Dorsalis pedis pulse - Rt. [x] normal  [] weak  [] non palpable     Lt. [x] normal  [] weak  [] non palpable    Posterior tibia pulse - Rt.   [x] normal [] weak  [] non palpable     Lt. [x] normal  [] weak  [] non palpable                                             Edema:       Rt. Leg  [x]             Lt. Leg  [x]  Telangiectasia & spider veins: Rt. Leg  []             Lt. Leg  []  Varicose veins:   Rt. Leg  []             Lt. Leg  []   Skin pigmentation:   Rt. Leg  [x]             Lt. Leg  [x]   Healed venous ulcer:   Rt. Leg  []             Lt. Leg  []                                            Assessment:       ICD-10-CM ICD-9-CM    1. Essential hypertension  I10 401.9    2. Fatty liver disease, nonalcoholic  X93.7 004.6    3. Other cirrhosis of liver (HCC)  K74.69 571.5    4. Leg swelling  M79.89 729.81        Plan:     1. Leg swelling, improving with lasix. Noted echo 2/2021. Will obtain LE dopplers to r/o VR. Continue     - Instruction given on daily leg elevation    - Instruction given for mild exercise        - Instruction given for weight reduction    2. BP controlled. 3. Hx hep C, cirrhosis: prev followed by [facility]  4. Other cardiac care per Dr Gaston Arroyo NP  8/3/2021      Patient seen and examined by me with nurse practitioner in vascular clinic. I personally performed all components of the history, physical, and medical decision making and agree with the assessment and plan as noted. Continue lasix. Check venous reflux study. Diet, exercise and wt loss d/w patient. Further recommendations based upon test results. BP controlled.      Bhupendra Martinez MD

## 2021-08-03 NOTE — PROGRESS NOTES
Fransisco Maldonado is a 70 y.o. adult  Chief Complaint   Patient presents with    Leg Swelling     1. Have you been to the ER, urgent care clinic since your last visit? Hospitalized since your last visit?no    2. Have you seen or consulted any other health care providers outside of the [address] [place] [address] since your last visit? Include any pap smears or colon screening.  No  Health Maintenance   Topic Date Due    COVID-19 Vaccine (1) Never done    DTaP/Tdap/Td series (1 - Tdap) Never done    Shingrix Vaccine Age 50> (1 of 2) Never done    Colorectal Cancer Screening Combo  03/11/2010    Pneumococcal 65+ years (1 of 1 - PPSV23) Never done    Medicare Yearly Exam  Never done    Flu Vaccine (1) 09/01/2021    Lipid Screen  10/26/2022     Visit Vitals  /68 (BP 1 Location: Right arm, BP Patient Position: At rest, BP Cuff Size: Large adult)   Pulse 60   Temp 98 °F (36.7 °C) (Skin)   Resp 18   Ht 5' 11\" (1.803 m)   Wt 281 lb (127.5 kg)   SpO2 98%   BMI 39.19 kg/m²

## 2021-08-17 ENCOUNTER — ANCILLARY PROCEDURE (OUTPATIENT)
Dept: CARDIOLOGY CLINIC | Age: 71
End: 2021-08-17
Payer: MEDICARE

## 2021-08-17 DIAGNOSIS — M79.89 LEG SWELLING: ICD-10-CM

## 2021-08-17 DIAGNOSIS — K74.69 OTHER CIRRHOSIS OF LIVER (HCC): ICD-10-CM

## 2021-08-17 DIAGNOSIS — I10 ESSENTIAL HYPERTENSION: ICD-10-CM

## 2021-08-17 DIAGNOSIS — K76.0 FATTY LIVER DISEASE, NONALCOHOLIC: ICD-10-CM

## 2021-08-17 PROCEDURE — 93970 EXTREMITY STUDY: CPT | Performed by: INTERNAL MEDICINE

## 2021-08-19 ENCOUNTER — TELEPHONE (OUTPATIENT)
Dept: CARDIOLOGY CLINIC | Age: 71
End: 2021-08-19

## 2021-08-19 LAB
LEFT GSV AT KNEE DIAM: 0.39 CM
LEFT GSV AT KNEE RFX: 0 S
LEFT GSV BK MID DIAM: 0.3 CM
LEFT GSV BK MID RFX: 2153 S
LEFT GSV JUNC DIAM: 0.76 CM
LEFT GSV JUNC RFX: 0 S
LEFT GSV THIGH PROX DIAM: 0.68 CM
LEFT GSV THIGH PROX RFX: 0 S
LEFT PERFORATOR DIAM: 0.45 CM
LEFT PERFORATOR RFX: 0 S
LEFT SSV PROX DIAM: 0.43 CM
LEFT SSV PROX RFX: 0 S
RIGHT GSV AK RFX: 0 S
RIGHT GSV AT KNEE DIAM: 0.32 CM
RIGHT GSV BK MID DIAM: 0.31 CM
RIGHT GSV BK MID RFX: 2009 S
RIGHT GSV JUNC DIAM: 0.87 CM
RIGHT GSV JUNC RFX: 0 S
RIGHT GSV THIGH PROX DIAM: 0.63 CM
RIGHT GSV THIGH PROX RFX: 0 S
RIGHT SSV PROX DIAM: 0.2 CM
RIGHT SSV PROX RFX: 0 S

## 2021-09-14 ENCOUNTER — OFFICE VISIT (OUTPATIENT)
Dept: CARDIOLOGY CLINIC | Age: 71
End: 2021-09-14
Payer: MEDICARE

## 2021-09-14 VITALS
DIASTOLIC BLOOD PRESSURE: 66 MMHG | SYSTOLIC BLOOD PRESSURE: 136 MMHG | HEART RATE: 56 BPM | RESPIRATION RATE: 18 BRPM | BODY MASS INDEX: 40.1 KG/M2 | HEIGHT: 71 IN | WEIGHT: 286.4 LBS | OXYGEN SATURATION: 98 %

## 2021-09-14 DIAGNOSIS — I10 ESSENTIAL HYPERTENSION: ICD-10-CM

## 2021-09-14 DIAGNOSIS — K74.69 OTHER CIRRHOSIS OF LIVER (HCC): ICD-10-CM

## 2021-09-14 DIAGNOSIS — M79.89 LEG SWELLING: ICD-10-CM

## 2021-09-14 DIAGNOSIS — I87.2 VENOUS INSUFFICIENCY OF BOTH LOWER EXTREMITIES: Primary | ICD-10-CM

## 2021-09-14 PROCEDURE — G8536 NO DOC ELDER MAL SCRN: HCPCS | Performed by: INTERNAL MEDICINE

## 2021-09-14 PROCEDURE — 99214 OFFICE O/P EST MOD 30 MIN: CPT | Performed by: INTERNAL MEDICINE

## 2021-09-14 PROCEDURE — G8752 SYS BP LESS 140: HCPCS | Performed by: INTERNAL MEDICINE

## 2021-09-14 PROCEDURE — 3017F COLORECTAL CA SCREEN DOC REV: CPT | Performed by: INTERNAL MEDICINE

## 2021-09-14 PROCEDURE — 1090F PRES/ABSN URINE INCON ASSESS: CPT | Performed by: INTERNAL MEDICINE

## 2021-09-14 PROCEDURE — G8417 CALC BMI ABV UP PARAM F/U: HCPCS | Performed by: INTERNAL MEDICINE

## 2021-09-14 PROCEDURE — 1101F PT FALLS ASSESS-DOCD LE1/YR: CPT | Performed by: INTERNAL MEDICINE

## 2021-09-14 PROCEDURE — G8400 PT W/DXA NO RESULTS DOC: HCPCS | Performed by: INTERNAL MEDICINE

## 2021-09-14 PROCEDURE — G8754 DIAS BP LESS 90: HCPCS | Performed by: INTERNAL MEDICINE

## 2021-09-14 PROCEDURE — G8427 DOCREV CUR MEDS BY ELIG CLIN: HCPCS | Performed by: INTERNAL MEDICINE

## 2021-09-14 PROCEDURE — G8510 SCR DEP NEG, NO PLAN REQD: HCPCS | Performed by: INTERNAL MEDICINE

## 2021-09-14 NOTE — PROGRESS NOTES
1. Have you been to the ER, urgent care clinic since your last visit? Hospitalized since your last visit? No    2. Have you seen or consulted any other health care providers outside of the [address] [place] [address] since your last visit? Include any pap smears or colon screening.  No

## 2021-09-14 NOTE — PROGRESS NOTES
9/14/2021 9:37 AM      Subjective:     Kamila Pittman is here for follow up and discuss doppler study. He report leg swelling, skin color changes, occasional leg cramps. Continues to take lasix which helps with the swelling. He denies chest pain, chest pressure/discomfort, dyspnea, palpitations, irregular heart beats, near-syncope, syncope, fatigue, orthopnea, paroxysmal nocturnal dyspnea, exertional chest pressure/discomfort, tachypnea, dyspnea on exertion. LE Venous doppler 8/2021  · No evidence of acute deep vein thrombosis in the right common femoral, superficial femoral, popliteal, posterior tibial, and peroneal veins. The veins were imaged in the transverse and longitudinal planes. The vessels showed normal color filling and compressibility. Doppler interrogation showed phasic and spontaneous flow. · No evidence of acute deep vein thrombosis in the left common femoral, superficial femoral, popliteal, posterior tibial, and peroneal veins. The veins were imaged in the transverse and longitudinal planes. The vessels showed normal color filling and compressibility. Doppler interrogation showed phasic and spontaneous flow. · Right great saphenous vein displays venous insufficiency. Right small saphenous vein is competent. · Left great saphenous vein displays venous insufficiency. Left small saphenous vein is competent.  Left  vein is competent    Visit Vitals  /66 (BP 1 Location: Right arm, BP Patient Position: Sitting, BP Cuff Size: Large adult)   Pulse (!) 56   Resp 18   Ht 5' 11\" (1.803 m)   Wt 286 lb 6.4 oz (129.9 kg)   SpO2 98%   BMI 39.94 kg/m²       Current Outpatient Medications   Medication Sig    dilTIAZem ER (DILACOR XR) 240 mg capsule 1 cap(s)    losartan (COZAAR) 100 mg tablet TAKE 1 TABLET BY MOUTH EVERY DAY    potassium chloride (K-DUR, KLOR-CON) 20 mEq tablet TAKE 1 TABLET BY MOUTH EVERY DAY    furosemide (LASIX) 40 mg tablet TAKE 1 TABLET BY MOUTH EVERY DAY     No current facility-administered medications for this visit. Objective:      Visit Vitals  /66 (BP 1 Location: Right arm, BP Patient Position: Sitting, BP Cuff Size: Large adult)   Pulse (!) 56   Resp 18   Ht 5' 11\" (1.803 m)   Wt 286 lb 6.4 oz (129.9 kg)   SpO2 98%   BMI 39.94 kg/m²       Data Review:     Past Medical History:   Diagnosis Date    Arrhythmia     irregular heartbeat - resolved after using treadmill per pt - no medication    Chronic pain     left shoulder    Hyperlipidemia     Hypertension     Liver disease     hep C      Past Surgical History:   Procedure Laterality Date    HX GI      colonoscopy/EGD    HX GI      11 inches of intestine removed - d/t polyp per pt     No Known Allergies   History reviewed. No pertinent family history. Social History     Socioeconomic History    Marital status:      Spouse name: Not on file    Number of children: Not on file    Years of education: Not on file    Highest education level: Not on file   Occupational History    Not on file   Tobacco Use    Smoking status: Former Smoker     Packs/day: 1.00     Years: 20.00     Pack years: 20.00     Types: Cigarettes     Quit date: 2002     Years since quittin.0    Smokeless tobacco: Never Used    Tobacco comment: quit 13+ years ago    Vaping Use    Vaping Use: Never used   Substance and Sexual Activity    Alcohol use: No    Drug use: Not Currently     Frequency: 3.0 times per week     Types: Heroin     Comment: quit 17 years ago    Sexual activity: Not on file   Other Topics Concern    Not on file   Social History Narrative    Not on file     Social Determinants of Health     Financial Resource Strain:     Difficulty of Paying Living Expenses:    Food Insecurity:     Worried About Running Out of Food in the Last Year:     [address] in the Last Year:    Transportation Needs:     Lack of Transportation (Medical):      Lack of Transportation (Non-Medical):    Physical Activity:     Days of Exercise per Week:     Minutes of Exercise per Session:    Stress:     Feeling of Stress :    Social Connections:     Frequency of Communication with Friends and Family:     Frequency of Social Gatherings with Friends and Family:     Attends L[employer] Services:     Active Member of Clubs or Organizations:     Attends Club or Organization Meetings:     Marital Status:    Intimate Partner Violence:     Fear of Current or Ex-Partner:     Emotionally Abused:     Physically Abused:     Sexually Abused:        Review of Systems     General: Not Present- Anorexia, Chills, Dietary Changes, Fatigue, Fever, Medication Changes, Night Sweats, Weight Gain > 10lbs. and Weight Loss > 10lbs. .  Skin: Not Present- Bruising and Excessive Sweating. HEENT: Not Present- Headache, Visual Loss and Vertigo. Respiratory: Not Present- Cough, Decreased Exercise Tolerance, Difficulty Breathing, Snoring and Wheezing. Cardiovascular: Not Present- Abnormal Blood Pressure, Chest Pain, Claudications, Difficulty Breathing On Exertion, , Fainting / Blacking Out, Irregular Heart Beat, Orthopnea, Palpitations, Paroxysmal Nocturnal Dyspnea, Rapid Heart Rate, Shortness of Breath   Gastrointestinal: Not Present- Black, Tarry Stool, Bloody Stool, Diarrhea, Hematemesis, Rectal Bleeding and Vomiting. Musculoskeletal: Not Present- Muscle Pain and Muscle Weakness. Neurological: Not Present- Dizziness. Psychiatric: Not Present- Depression. Endocrine: Not Present- Cold Intolerance, Heat Intolerance and Thyroid Problems. Hematology: Not Present- Abnormal Bleeding, Anemia, Blood Clots and Easy Bruising. Physical Exam   The physical exam findings are as follows:     General   Mental Status - Alert. General Appearance - Cooperative and Well groomed. Not in acute distress. Orientation - Oriented to time, Oriented to place and Oriented to person.  Build & Nutrition - Well developed. Skin   General: - Normal.      HEENT  Head - Normal.  Eye - Normal.  Mouth & Throat - Normal.      Neck   Carotid Arteries - normal upstroke. No Bruits. Thyroid: Gland - Normal size and consistency. Chest and Lung Exam   Inspection:   Chest Wall: - Normal. Accessory muscles - No use of accessory muscles in breathing. Auscultation:   Breath sounds: - Normal.      Cardiovascular   Inspection: Jugular vein - Bilateral - Inspection Normal.  Palpation/Percussion:   Apical Impulse: - Normal.  Auscultation: Rhythm - Regular. Heart Sounds - S1 WNL and S2 WNL. No S3 or S4. Murmurs & Other Heart Sounds: Auscultation of the heart reveals - No Murmurs. Abdomen   Palpation/Percussion: Palpation and Percussion of the abdomen reveal - No Palpable abdominal masses. Liver: - Normal.  Spleen: - Normal.  Auscultation: Auscultation of the abdomen reveals - Bowel sounds normal.      Neurologic   Mental Status: Affect - normal.  Motor: - Normal. Gait - Normal.           PHYSICIAN TO COMPLETE BELOW THIS POINT    Date of Initial Physician Evaluation: 9/14/2021  Check all that apply:  [x] Reviewed Venous history  [x] Physical examination of the affected leg(s)   [x] Duplex or Doppler Scan results reviewed. Duplex or Doppler Ultrasound of the venous system demonstrate:  [x] Absence of deep venous thrombosis  [] Greater and/or lesser saphenous vein or  valvular incompetence/reflux that correlates with        patients symptoms  []   valvular incompetence/reflux that correlates with patients symptoms    [x] Graduated, elasticized compression stockings (20-30 mmHg), has been prescribed. [x] Standing Photos taken of leg(s)  [] Front     [] Back     [x] Front and back   [x] Other causes of patients leg(s) symptoms have been ruled out             Assessment:       ICD-10-CM ICD-9-CM    1. Venous insufficiency of both lower extremities  I87.2 459.81    2. Essential hypertension  I10 401.9    3. Other cirrhosis of liver (HCC)  K74.69 571.5    4. Leg swelling  M79.89 729.81        CEAP class:      []C1   []C2   []C3    [x]C4    [x]4a    []4b   []C5   []C6           Plan:     1. Venous Insufficiency. Bilateral GSV reflux 8/2021. Noted echo 2/2021. Leg swelling, improving with lasix. Has occasional leg cramps, skin discoloration. Will start 20-30 mmHg thigh high compression stockings x 3 mos. Continue      - Instruction given on daily leg elevation    - Instruction given for mild exercise        - Instruction given for weight reduction     2. HTN: BP controlled with current therapy  3. Hx hep C, cirrhosis: prev followed by [facility]  4. Other cardiac care per Dr Patricia Barksdale current care and f/u in 3 mos    Charlie Perez NP  9/14/2021      Patient seen and examined by me with nurse practitioner in vascular clinic. I personally performed all components of the history, physical, and medical decision making and agree with the assessment and plan as noted. Venous reflux study and management plan d/w patient. BP controlled.      Ayla Grubbs MD

## 2022-03-18 PROBLEM — K74.69 OTHER CIRRHOSIS OF LIVER (HCC): Status: ACTIVE | Noted: 2018-07-25

## 2022-03-19 PROBLEM — M79.89 LEG SWELLING: Status: ACTIVE | Noted: 2021-09-14

## 2022-03-19 PROBLEM — I87.2 VENOUS INSUFFICIENCY OF BOTH LOWER EXTREMITIES: Status: ACTIVE | Noted: 2021-09-14

## 2022-03-19 PROBLEM — I10 ESSENTIAL HYPERTENSION: Status: ACTIVE | Noted: 2018-06-15

## 2022-03-20 PROBLEM — B18.2 CHRONIC HEPATITIS C WITHOUT HEPATIC COMA (HCC): Status: ACTIVE | Noted: 2018-06-15

## 2022-03-20 PROBLEM — K76.0 FATTY LIVER DISEASE, NONALCOHOLIC: Status: ACTIVE | Noted: 2018-07-25

## 2023-01-01 ENCOUNTER — APPOINTMENT (OUTPATIENT)
Facility: HOSPITAL | Age: 73
End: 2023-01-01
Payer: MEDICARE

## 2023-01-01 ENCOUNTER — HOSPITAL ENCOUNTER (INPATIENT)
Facility: HOSPITAL | Age: 73
LOS: 5 days | End: 2023-11-18
Attending: EMERGENCY MEDICINE | Admitting: STUDENT IN AN ORGANIZED HEALTH CARE EDUCATION/TRAINING PROGRAM
Payer: MEDICARE

## 2023-01-01 VITALS
BODY MASS INDEX: 31.42 KG/M2 | DIASTOLIC BLOOD PRESSURE: 99 MMHG | HEART RATE: 74 BPM | WEIGHT: 224.43 LBS | OXYGEN SATURATION: 93 % | TEMPERATURE: 97.5 F | RESPIRATION RATE: 14 BRPM | HEIGHT: 71 IN | SYSTOLIC BLOOD PRESSURE: 163 MMHG

## 2023-01-01 DIAGNOSIS — R18.8 OTHER ASCITES: ICD-10-CM

## 2023-01-01 DIAGNOSIS — K72.01 ACUTE LIVER FAILURE WITH HEPATIC COMA (HCC): Primary | ICD-10-CM

## 2023-01-01 DIAGNOSIS — K76.7 HEPATORENAL SYNDROME (HCC): ICD-10-CM

## 2023-01-01 LAB
ALBUMIN FLD-MCNC: 1.6 G/DL
ALBUMIN SERPL-MCNC: 2.7 G/DL (ref 3.5–5)
ALBUMIN SERPL-MCNC: 2.9 G/DL (ref 3.5–5)
ALBUMIN SERPL-MCNC: 3.1 G/DL (ref 3.5–5)
ALBUMIN/GLOB SERPL: 0.6 (ref 1.1–2.2)
ALBUMIN/GLOB SERPL: 0.7 (ref 1.1–2.2)
ALBUMIN/GLOB SERPL: 0.8 (ref 1.1–2.2)
ALP SERPL-CCNC: 399 U/L (ref 45–117)
ALP SERPL-CCNC: 408 U/L (ref 45–117)
ALP SERPL-CCNC: 452 U/L (ref 45–117)
ALT SERPL-CCNC: 551 U/L (ref 12–78)
ALT SERPL-CCNC: 600 U/L (ref 12–78)
ALT SERPL-CCNC: 976 U/L (ref 12–78)
AMMONIA PLAS-SCNC: 18 UMOL/L
AMMONIA PLAS-SCNC: <10 UMOL/L
AMMONIA PLAS-SCNC: <10 UMOL/L
AMORPH CRY URNS QL MICRO: ABNORMAL
ANION GAP SERPL CALC-SCNC: 17 MMOL/L (ref 5–15)
ANION GAP SERPL CALC-SCNC: 17 MMOL/L (ref 5–15)
ANION GAP SERPL CALC-SCNC: 18 MMOL/L (ref 5–15)
ANION GAP SERPL CALC-SCNC: 19 MMOL/L (ref 5–15)
ANION GAP SERPL CALC-SCNC: 19 MMOL/L (ref 5–15)
APPEARANCE FLD: ABNORMAL
APPEARANCE UR: ABNORMAL
AST SERPL-CCNC: 437 U/L (ref 15–37)
AST SERPL-CCNC: 468 U/L (ref 15–37)
AST SERPL-CCNC: 856 U/L (ref 15–37)
BACTERIA SPEC CULT: NORMAL
BACTERIA SPEC CULT: NORMAL
BACTERIA URNS QL MICRO: ABNORMAL /HPF
BASOPHILS # BLD: 0 K/UL (ref 0–0.1)
BASOPHILS NFR BLD: 0 % (ref 0–1)
BILIRUB DIRECT SERPL-MCNC: 10.9 MG/DL (ref 0–0.2)
BILIRUB SERPL-MCNC: 13.5 MG/DL (ref 0.2–1)
BILIRUB SERPL-MCNC: 14 MG/DL (ref 0.2–1)
BILIRUB SERPL-MCNC: 15.1 MG/DL (ref 0.2–1)
BILIRUB UR QL CFM: POSITIVE
BUN SERPL-MCNC: 103 MG/DL (ref 6–20)
BUN SERPL-MCNC: 106 MG/DL (ref 6–20)
BUN SERPL-MCNC: 111 MG/DL (ref 6–20)
BUN SERPL-MCNC: 115 MG/DL (ref 6–20)
BUN SERPL-MCNC: 94 MG/DL (ref 6–20)
BUN/CREAT SERPL: 13 (ref 12–20)
BUN/CREAT SERPL: 13 (ref 12–20)
BUN/CREAT SERPL: 14 (ref 12–20)
CALCIUM SERPL-MCNC: 8.1 MG/DL (ref 8.5–10.1)
CALCIUM SERPL-MCNC: 8.2 MG/DL (ref 8.5–10.1)
CALCIUM SERPL-MCNC: 8.4 MG/DL (ref 8.5–10.1)
CALCIUM SERPL-MCNC: 8.9 MG/DL (ref 8.5–10.1)
CALCIUM SERPL-MCNC: 9.2 MG/DL (ref 8.5–10.1)
CHLORIDE SERPL-SCNC: 107 MMOL/L (ref 97–108)
CHLORIDE SERPL-SCNC: 108 MMOL/L (ref 97–108)
CHLORIDE SERPL-SCNC: 110 MMOL/L (ref 97–108)
CHLORIDE UR-SCNC: 17 MMOL/L
CO2 SERPL-SCNC: 12 MMOL/L (ref 21–32)
CO2 SERPL-SCNC: 17 MMOL/L (ref 21–32)
CO2 SERPL-SCNC: 19 MMOL/L (ref 21–32)
CO2 SERPL-SCNC: 20 MMOL/L (ref 21–32)
CO2 SERPL-SCNC: 20 MMOL/L (ref 21–32)
COLOR FLD: ABNORMAL
COLOR UR: ABNORMAL
COMMENT:: NORMAL
CREAT SERPL-MCNC: 6.89 MG/DL (ref 0.7–1.3)
CREAT SERPL-MCNC: 7.22 MG/DL (ref 0.7–1.3)
CREAT SERPL-MCNC: 7.85 MG/DL (ref 0.7–1.3)
CREAT SERPL-MCNC: 8.81 MG/DL (ref 0.7–1.3)
CREAT SERPL-MCNC: 8.87 MG/DL (ref 0.7–1.3)
CREAT UR-MCNC: 135 MG/DL
DIFFERENTIAL METHOD BLD: ABNORMAL
EOSINOPHIL # BLD: 0 K/UL (ref 0–0.4)
EOSINOPHIL # BLD: 0 K/UL (ref 0–0.4)
EOSINOPHIL # BLD: 0.1 K/UL (ref 0–0.4)
EOSINOPHIL NFR BLD: 0 % (ref 0–7)
EOSINOPHIL NFR BLD: 0 % (ref 0–7)
EOSINOPHIL NFR BLD: 1 % (ref 0–7)
EPITH CASTS URNS QL MICRO: ABNORMAL /LPF
ERYTHROCYTE [DISTWIDTH] IN BLOOD BY AUTOMATED COUNT: 20.8 % (ref 11.5–14.5)
ERYTHROCYTE [DISTWIDTH] IN BLOOD BY AUTOMATED COUNT: 20.9 % (ref 11.5–14.5)
ERYTHROCYTE [DISTWIDTH] IN BLOOD BY AUTOMATED COUNT: 21.4 % (ref 11.5–14.5)
ETHANOL SERPL-MCNC: <10 MG/DL (ref 0–0.08)
GLOBULIN SER CALC-MCNC: 3.8 G/DL (ref 2–4)
GLOBULIN SER CALC-MCNC: 4 G/DL (ref 2–4)
GLOBULIN SER CALC-MCNC: 4.6 G/DL (ref 2–4)
GLUCOSE BLD STRIP.AUTO-MCNC: 102 MG/DL (ref 65–117)
GLUCOSE BLD STRIP.AUTO-MCNC: 103 MG/DL (ref 65–117)
GLUCOSE BLD STRIP.AUTO-MCNC: 104 MG/DL (ref 65–117)
GLUCOSE BLD STRIP.AUTO-MCNC: 104 MG/DL (ref 65–117)
GLUCOSE BLD STRIP.AUTO-MCNC: 124 MG/DL (ref 65–117)
GLUCOSE BLD STRIP.AUTO-MCNC: 56 MG/DL (ref 65–117)
GLUCOSE BLD STRIP.AUTO-MCNC: 57 MG/DL (ref 65–117)
GLUCOSE BLD STRIP.AUTO-MCNC: 59 MG/DL (ref 65–117)
GLUCOSE BLD STRIP.AUTO-MCNC: 59 MG/DL (ref 65–117)
GLUCOSE BLD STRIP.AUTO-MCNC: 64 MG/DL (ref 65–117)
GLUCOSE BLD STRIP.AUTO-MCNC: 66 MG/DL (ref 65–117)
GLUCOSE BLD STRIP.AUTO-MCNC: 66 MG/DL (ref 65–117)
GLUCOSE BLD STRIP.AUTO-MCNC: 68 MG/DL (ref 65–117)
GLUCOSE BLD STRIP.AUTO-MCNC: 68 MG/DL (ref 65–117)
GLUCOSE BLD STRIP.AUTO-MCNC: 69 MG/DL (ref 65–117)
GLUCOSE BLD STRIP.AUTO-MCNC: 72 MG/DL (ref 65–117)
GLUCOSE BLD STRIP.AUTO-MCNC: 74 MG/DL (ref 65–117)
GLUCOSE BLD STRIP.AUTO-MCNC: 80 MG/DL (ref 65–117)
GLUCOSE BLD STRIP.AUTO-MCNC: 84 MG/DL (ref 65–117)
GLUCOSE BLD STRIP.AUTO-MCNC: 87 MG/DL (ref 65–117)
GLUCOSE SERPL-MCNC: 117 MG/DL (ref 65–100)
GLUCOSE SERPL-MCNC: 128 MG/DL (ref 65–100)
GLUCOSE SERPL-MCNC: 62 MG/DL (ref 65–100)
GLUCOSE SERPL-MCNC: 64 MG/DL (ref 65–100)
GLUCOSE SERPL-MCNC: 92 MG/DL (ref 65–100)
GLUCOSE UR STRIP.AUTO-MCNC: NEGATIVE MG/DL
GRAM STN SPEC: NORMAL
GRAM STN SPEC: NORMAL
GRAN CASTS URNS QL MICRO: ABNORMAL /LPF
HCT VFR BLD AUTO: 24.1 % (ref 36.6–50.3)
HCT VFR BLD AUTO: 27.7 % (ref 36.6–50.3)
HCT VFR BLD AUTO: 27.8 % (ref 36.6–50.3)
HGB BLD-MCNC: 7.9 G/DL (ref 12.1–17)
HGB BLD-MCNC: 8.9 G/DL (ref 12.1–17)
HGB BLD-MCNC: 8.9 G/DL (ref 12.1–17)
HGB UR QL STRIP: ABNORMAL
HYALINE CASTS URNS QL MICRO: ABNORMAL /LPF (ref 0–5)
IMM GRANULOCYTES # BLD AUTO: 0 K/UL (ref 0–0.04)
IMM GRANULOCYTES NFR BLD AUTO: 0 % (ref 0–0.5)
INR PPP: 1.8 (ref 0.9–1.1)
INR PPP: 2.4 (ref 0.9–1.1)
KETONES UR QL STRIP.AUTO: ABNORMAL MG/DL
LACTATE BLD-SCNC: 3.68 MMOL/L (ref 0.4–2)
LACTATE BLD-SCNC: 4.86 MMOL/L (ref 0.4–2)
LEUKOCYTE ESTERASE UR QL STRIP.AUTO: ABNORMAL
LIPASE SERPL-CCNC: 37 U/L (ref 13–75)
LYMPHOCYTES # BLD: 0.9 K/UL (ref 0.8–3.5)
LYMPHOCYTES # BLD: 1.4 K/UL (ref 0.8–3.5)
LYMPHOCYTES # BLD: 2 K/UL (ref 0.8–3.5)
LYMPHOCYTES NFR BLD: 15 % (ref 12–49)
LYMPHOCYTES NFR BLD: 18 % (ref 12–49)
LYMPHOCYTES NFR BLD: 8 % (ref 12–49)
LYMPHOCYTES NFR FLD: 10 %
MAGNESIUM SERPL-MCNC: 2.4 MG/DL (ref 1.6–2.4)
MCH RBC QN AUTO: 27.1 PG (ref 26–34)
MCH RBC QN AUTO: 27.2 PG (ref 26–34)
MCH RBC QN AUTO: 27.6 PG (ref 26–34)
MCHC RBC AUTO-ENTMCNC: 32 G/DL (ref 30–36.5)
MCHC RBC AUTO-ENTMCNC: 32.1 G/DL (ref 30–36.5)
MCHC RBC AUTO-ENTMCNC: 32.8 G/DL (ref 30–36.5)
MCV RBC AUTO: 82.5 FL (ref 80–99)
MCV RBC AUTO: 84.7 FL (ref 80–99)
MCV RBC AUTO: 86.3 FL (ref 80–99)
MESOTHL CELL NFR FLD: 14 %
METAMYELOCYTES NFR BLD MANUAL: 1 %
METAMYELOCYTES NFR BLD MANUAL: 1 %
MONOCYTES # BLD: 0.7 K/UL (ref 0–1)
MONOCYTES # BLD: 0.7 K/UL (ref 0–1)
MONOCYTES # BLD: 0.8 K/UL (ref 0–1)
MONOCYTES NFR BLD: 6 % (ref 5–13)
MONOCYTES NFR BLD: 7 % (ref 5–13)
MONOCYTES NFR BLD: 8 % (ref 5–13)
MONOS+MACROS NFR FLD: 50 %
NEUTROPHILS NFR FLD: 26 %
NEUTS BAND NFR BLD MANUAL: 1 %
NEUTS BAND NFR BLD MANUAL: 4 %
NEUTS SEG # BLD: 6.9 K/UL (ref 1.8–8)
NEUTS SEG # BLD: 8.2 K/UL (ref 1.8–8)
NEUTS SEG # BLD: 9.9 K/UL (ref 1.8–8)
NEUTS SEG NFR BLD: 71 % (ref 32–75)
NEUTS SEG NFR BLD: 75 % (ref 32–75)
NEUTS SEG NFR BLD: 84 % (ref 32–75)
NITRITE UR QL STRIP.AUTO: NEGATIVE
NRBC # BLD: 0.03 K/UL (ref 0–0.01)
NRBC # BLD: 0.04 K/UL (ref 0–0.01)
NRBC # BLD: 0.05 K/UL (ref 0–0.01)
NRBC BLD-RTO: 0.3 PER 100 WBC
NRBC BLD-RTO: 0.4 PER 100 WBC
NRBC BLD-RTO: 0.5 PER 100 WBC
NUC CELL # FLD: 263 /CU MM
PH UR STRIP: 5 (ref 5–8)
PHOSPHATE SERPL-MCNC: 6.8 MG/DL (ref 2.6–4.7)
PHOSPHATE SERPL-MCNC: 6.9 MG/DL (ref 2.6–4.7)
PLATELET # BLD AUTO: 174 K/UL (ref 150–400)
PLATELET # BLD AUTO: 194 K/UL (ref 150–400)
PLATELET # BLD AUTO: 225 K/UL (ref 150–400)
PMV BLD AUTO: 10.2 FL (ref 8.9–12.9)
PMV BLD AUTO: 11.5 FL (ref 8.9–12.9)
PMV BLD AUTO: 9.3 FL (ref 8.9–12.9)
POTASSIUM SERPL-SCNC: 3 MMOL/L (ref 3.5–5.1)
POTASSIUM SERPL-SCNC: 3.2 MMOL/L (ref 3.5–5.1)
POTASSIUM SERPL-SCNC: 3.5 MMOL/L (ref 3.5–5.1)
POTASSIUM SERPL-SCNC: 3.8 MMOL/L (ref 3.5–5.1)
POTASSIUM SERPL-SCNC: 4.4 MMOL/L (ref 3.5–5.1)
PROT SERPL-MCNC: 6.7 G/DL (ref 6.4–8.2)
PROT SERPL-MCNC: 6.7 G/DL (ref 6.4–8.2)
PROT SERPL-MCNC: 7.5 G/DL (ref 6.4–8.2)
PROT UR STRIP-MCNC: 100 MG/DL
PROTHROMBIN TIME: 18.5 SEC (ref 9–11.1)
PROTHROMBIN TIME: 23.5 SEC (ref 9–11.1)
RBC # BLD AUTO: 2.92 M/UL (ref 4.1–5.7)
RBC # BLD AUTO: 3.22 M/UL (ref 4.1–5.7)
RBC # BLD AUTO: 3.27 M/UL (ref 4.1–5.7)
RBC # FLD: >100 /CU MM
RBC #/AREA URNS HPF: ABNORMAL /HPF (ref 0–5)
RBC MORPH BLD: ABNORMAL
SERVICE CMNT-IMP: ABNORMAL
SERVICE CMNT-IMP: NORMAL
SODIUM SERPL-SCNC: 141 MMOL/L (ref 136–145)
SODIUM SERPL-SCNC: 141 MMOL/L (ref 136–145)
SODIUM SERPL-SCNC: 144 MMOL/L (ref 136–145)
SODIUM SERPL-SCNC: 145 MMOL/L (ref 136–145)
SODIUM SERPL-SCNC: 146 MMOL/L (ref 136–145)
SODIUM UR-SCNC: 30 MMOL/L
SP GR UR REFRACTOMETRY: 1.01
SPECIMEN HOLD: NORMAL
SPECIMEN SOURCE FLD: ABNORMAL
SPECIMEN SOURCE FLD: NORMAL
TRICHOMONAS UR QL MICRO: PRESENT
URINE CULTURE IF INDICATED: ABNORMAL
UROBILINOGEN UR QL STRIP.AUTO: 1 EU/DL (ref 0.2–1)
WBC # BLD AUTO: 11 K/UL (ref 4.1–11.1)
WBC # BLD AUTO: 11.7 K/UL (ref 4.1–11.1)
WBC # BLD AUTO: 9.2 K/UL (ref 4.1–11.1)
WBC URNS QL MICRO: >100 /HPF (ref 0–4)

## 2023-01-01 PROCEDURE — 2500000003 HC RX 250 WO HCPCS: Performed by: STUDENT IN AN ORGANIZED HEALTH CARE EDUCATION/TRAINING PROGRAM

## 2023-01-01 PROCEDURE — 70450 CT HEAD/BRAIN W/O DYE: CPT

## 2023-01-01 PROCEDURE — 71045 X-RAY EXAM CHEST 1 VIEW: CPT

## 2023-01-01 PROCEDURE — 99497 ADVNCD CARE PLAN 30 MIN: CPT | Performed by: NURSE PRACTITIONER

## 2023-01-01 PROCEDURE — 2500000003 HC RX 250 WO HCPCS: Performed by: NURSE PRACTITIONER

## 2023-01-01 PROCEDURE — 6360000002 HC RX W HCPCS: Performed by: STUDENT IN AN ORGANIZED HEALTH CARE EDUCATION/TRAINING PROGRAM

## 2023-01-01 PROCEDURE — 2709999900 US GUIDED PARACENTESIS

## 2023-01-01 PROCEDURE — 97530 THERAPEUTIC ACTIVITIES: CPT | Performed by: OCCUPATIONAL THERAPIST

## 2023-01-01 PROCEDURE — 82140 ASSAY OF AMMONIA: CPT

## 2023-01-01 PROCEDURE — 1100000003 HC PRIVATE W/ TELEMETRY

## 2023-01-01 PROCEDURE — 80053 COMPREHEN METABOLIC PANEL: CPT

## 2023-01-01 PROCEDURE — 76937 US GUIDE VASCULAR ACCESS: CPT

## 2023-01-01 PROCEDURE — 87070 CULTURE OTHR SPECIMN AEROBIC: CPT

## 2023-01-01 PROCEDURE — P9047 ALBUMIN (HUMAN), 25%, 50ML: HCPCS | Performed by: STUDENT IN AN ORGANIZED HEALTH CARE EDUCATION/TRAINING PROGRAM

## 2023-01-01 PROCEDURE — 99285 EMERGENCY DEPT VISIT HI MDM: CPT

## 2023-01-01 PROCEDURE — 36415 COLL VENOUS BLD VENIPUNCTURE: CPT

## 2023-01-01 PROCEDURE — 6370000000 HC RX 637 (ALT 250 FOR IP): Performed by: STUDENT IN AN ORGANIZED HEALTH CARE EDUCATION/TRAINING PROGRAM

## 2023-01-01 PROCEDURE — 2580000003 HC RX 258: Performed by: INTERNAL MEDICINE

## 2023-01-01 PROCEDURE — 6360000002 HC RX W HCPCS: Performed by: NURSE PRACTITIONER

## 2023-01-01 PROCEDURE — 2580000003 HC RX 258: Performed by: STUDENT IN AN ORGANIZED HEALTH CARE EDUCATION/TRAINING PROGRAM

## 2023-01-01 PROCEDURE — 83690 ASSAY OF LIPASE: CPT

## 2023-01-01 PROCEDURE — 99223 1ST HOSP IP/OBS HIGH 75: CPT | Performed by: NURSE PRACTITIONER

## 2023-01-01 PROCEDURE — 0W9G3ZX DRAINAGE OF PERITONEAL CAVITY, PERCUTANEOUS APPROACH, DIAGNOSTIC: ICD-10-PCS | Performed by: EMERGENCY MEDICINE

## 2023-01-01 PROCEDURE — 97163 PT EVAL HIGH COMPLEX 45 MIN: CPT

## 2023-01-01 PROCEDURE — 2500000003 HC RX 250 WO HCPCS

## 2023-01-01 PROCEDURE — 80069 RENAL FUNCTION PANEL: CPT

## 2023-01-01 PROCEDURE — 87040 BLOOD CULTURE FOR BACTERIA: CPT

## 2023-01-01 PROCEDURE — 87205 SMEAR GRAM STAIN: CPT

## 2023-01-01 PROCEDURE — 6360000002 HC RX W HCPCS: Performed by: INTERNAL MEDICINE

## 2023-01-01 PROCEDURE — 74176 CT ABD & PELVIS W/O CONTRAST: CPT

## 2023-01-01 PROCEDURE — 99498 ADVNCD CARE PLAN ADDL 30 MIN: CPT | Performed by: NURSE PRACTITIONER

## 2023-01-01 PROCEDURE — 74018 RADEX ABDOMEN 1 VIEW: CPT

## 2023-01-01 PROCEDURE — 82570 ASSAY OF URINE CREATININE: CPT

## 2023-01-01 PROCEDURE — 2060000000 HC ICU INTERMEDIATE R&B

## 2023-01-01 PROCEDURE — 2500000003 HC RX 250 WO HCPCS: Performed by: INTERNAL MEDICINE

## 2023-01-01 PROCEDURE — 82962 GLUCOSE BLOOD TEST: CPT

## 2023-01-01 PROCEDURE — 82248 BILIRUBIN DIRECT: CPT

## 2023-01-01 PROCEDURE — 2580000003 HC RX 258: Performed by: EMERGENCY MEDICINE

## 2023-01-01 PROCEDURE — 6370000000 HC RX 637 (ALT 250 FOR IP): Performed by: INTERNAL MEDICINE

## 2023-01-01 PROCEDURE — 97530 THERAPEUTIC ACTIVITIES: CPT

## 2023-01-01 PROCEDURE — 2500000003 HC RX 250 WO HCPCS: Performed by: EMERGENCY MEDICINE

## 2023-01-01 PROCEDURE — 49083 ABD PARACENTESIS W/IMAGING: CPT

## 2023-01-01 PROCEDURE — P9045 ALBUMIN (HUMAN), 5%, 250 ML: HCPCS | Performed by: EMERGENCY MEDICINE

## 2023-01-01 PROCEDURE — 87015 SPECIMEN INFECT AGNT CONCNTJ: CPT

## 2023-01-01 PROCEDURE — 82436 ASSAY OF URINE CHLORIDE: CPT

## 2023-01-01 PROCEDURE — 82042 OTHER SOURCE ALBUMIN QUAN EA: CPT

## 2023-01-01 PROCEDURE — 2700000000 HC OXYGEN THERAPY PER DAY

## 2023-01-01 PROCEDURE — 83735 ASSAY OF MAGNESIUM: CPT

## 2023-01-01 PROCEDURE — 6360000002 HC RX W HCPCS: Performed by: EMERGENCY MEDICINE

## 2023-01-01 PROCEDURE — 6370000000 HC RX 637 (ALT 250 FOR IP): Performed by: NURSE PRACTITIONER

## 2023-01-01 PROCEDURE — 36556 INSERT NON-TUNNEL CV CATH: CPT

## 2023-01-01 PROCEDURE — 85025 COMPLETE CBC W/AUTO DIFF WBC: CPT

## 2023-01-01 PROCEDURE — 96365 THER/PROPH/DIAG IV INF INIT: CPT

## 2023-01-01 PROCEDURE — 85610 PROTHROMBIN TIME: CPT

## 2023-01-01 PROCEDURE — 83605 ASSAY OF LACTIC ACID: CPT

## 2023-01-01 PROCEDURE — 97535 SELF CARE MNGMENT TRAINING: CPT | Performed by: OCCUPATIONAL THERAPIST

## 2023-01-01 PROCEDURE — 6370000000 HC RX 637 (ALT 250 FOR IP): Performed by: EMERGENCY MEDICINE

## 2023-01-01 PROCEDURE — 99233 SBSQ HOSP IP/OBS HIGH 50: CPT | Performed by: NURSE PRACTITIONER

## 2023-01-01 PROCEDURE — 82077 ASSAY SPEC XCP UR&BREATH IA: CPT

## 2023-01-01 PROCEDURE — 97165 OT EVAL LOW COMPLEX 30 MIN: CPT | Performed by: OCCUPATIONAL THERAPIST

## 2023-01-01 PROCEDURE — 81001 URINALYSIS AUTO W/SCOPE: CPT

## 2023-01-01 PROCEDURE — P9047 ALBUMIN (HUMAN), 25%, 50ML: HCPCS | Performed by: INTERNAL MEDICINE

## 2023-01-01 PROCEDURE — 89050 BODY FLUID CELL COUNT: CPT

## 2023-01-01 PROCEDURE — 87086 URINE CULTURE/COLONY COUNT: CPT

## 2023-01-01 PROCEDURE — 84300 ASSAY OF URINE SODIUM: CPT

## 2023-01-01 RX ORDER — HYDROMORPHONE HYDROCHLORIDE 1 MG/ML
0.5 INJECTION, SOLUTION INTRAMUSCULAR; INTRAVENOUS; SUBCUTANEOUS
Status: DISCONTINUED | OUTPATIENT
Start: 2023-01-01 | End: 2023-01-01 | Stop reason: HOSPADM

## 2023-01-01 RX ORDER — POLYETHYLENE GLYCOL 3350 17 G/17G
17 POWDER, FOR SOLUTION ORAL DAILY PRN
Status: DISCONTINUED | OUTPATIENT
Start: 2023-01-01 | End: 2023-01-01

## 2023-01-01 RX ORDER — M-VIT,TX,IRON,MINS/CALC/FOLIC 27MG-0.4MG
1 TABLET ORAL DAILY
COMMUNITY

## 2023-01-01 RX ORDER — LORAZEPAM 2 MG/ML
0.5 CONCENTRATE ORAL EVERY 4 HOURS PRN
Status: DISCONTINUED | OUTPATIENT
Start: 2023-01-01 | End: 2023-01-01

## 2023-01-01 RX ORDER — 0.9 % SODIUM CHLORIDE 0.9 %
500 INTRAVENOUS SOLUTION INTRAVENOUS ONCE
Status: COMPLETED | OUTPATIENT
Start: 2023-01-01 | End: 2023-01-01

## 2023-01-01 RX ORDER — POTASSIUM CHLORIDE 1.5 G/1.58G
40 POWDER, FOR SOLUTION ORAL 2 TIMES DAILY
Status: DISCONTINUED | OUTPATIENT
Start: 2023-01-01 | End: 2023-01-01

## 2023-01-01 RX ORDER — HALOPERIDOL 2 MG/ML
2 SOLUTION ORAL EVERY 6 HOURS PRN
Status: DISCONTINUED | OUTPATIENT
Start: 2023-01-01 | End: 2023-01-01

## 2023-01-01 RX ORDER — LACTULOSE 10 G/15ML
20 SOLUTION ORAL DAILY
Status: DISCONTINUED | OUTPATIENT
Start: 2023-01-01 | End: 2023-01-01

## 2023-01-01 RX ORDER — MIDODRINE HYDROCHLORIDE 5 MG/1
5 TABLET ORAL
Status: DISCONTINUED | OUTPATIENT
Start: 2023-01-01 | End: 2023-01-01

## 2023-01-01 RX ORDER — METOPROLOL TARTRATE 1 MG/ML
5 INJECTION, SOLUTION INTRAVENOUS EVERY 6 HOURS PRN
Status: DISCONTINUED | OUTPATIENT
Start: 2023-01-01 | End: 2023-01-01

## 2023-01-01 RX ORDER — SODIUM CHLORIDE 0.9 % (FLUSH) 0.9 %
5-40 SYRINGE (ML) INJECTION PRN
Status: DISCONTINUED | OUTPATIENT
Start: 2023-01-01 | End: 2023-01-01 | Stop reason: HOSPADM

## 2023-01-01 RX ORDER — LIDOCAINE HCL/EPINEPHRINE/PF 2%-1:200K
20 VIAL (ML) INJECTION ONCE
Status: COMPLETED | OUTPATIENT
Start: 2023-01-01 | End: 2023-01-01

## 2023-01-01 RX ORDER — CASTOR OIL AND BALSAM, PERU 788; 87 MG/G; MG/G
OINTMENT TOPICAL 2 TIMES DAILY
Status: DISCONTINUED | OUTPATIENT
Start: 2023-01-01 | End: 2023-01-01 | Stop reason: HOSPADM

## 2023-01-01 RX ORDER — LIDOCAINE HYDROCHLORIDE 20 MG/ML
JELLY TOPICAL PRN
Status: DISCONTINUED | OUTPATIENT
Start: 2023-01-01 | End: 2023-01-01 | Stop reason: SDUPTHER

## 2023-01-01 RX ORDER — ONDANSETRON 4 MG/1
4 TABLET, ORALLY DISINTEGRATING ORAL EVERY 8 HOURS PRN
Status: DISCONTINUED | OUTPATIENT
Start: 2023-01-01 | End: 2023-01-01 | Stop reason: HOSPADM

## 2023-01-01 RX ORDER — TRAMADOL HYDROCHLORIDE 50 MG/1
50 TABLET ORAL EVERY 6 HOURS PRN
Status: DISCONTINUED | OUTPATIENT
Start: 2023-01-01 | End: 2023-01-01

## 2023-01-01 RX ORDER — SODIUM CHLORIDE, SODIUM LACTATE, POTASSIUM CHLORIDE, CALCIUM CHLORIDE 600; 310; 30; 20 MG/100ML; MG/100ML; MG/100ML; MG/100ML
INJECTION, SOLUTION INTRAVENOUS CONTINUOUS
Status: DISCONTINUED | OUTPATIENT
Start: 2023-01-01 | End: 2023-01-01

## 2023-01-01 RX ORDER — LIDOCAINE HYDROCHLORIDE 10 MG/ML
10 INJECTION, SOLUTION EPIDURAL; INFILTRATION; INTRACAUDAL; PERINEURAL ONCE
Status: COMPLETED | OUTPATIENT
Start: 2023-01-01 | End: 2023-01-01

## 2023-01-01 RX ORDER — LACTULOSE 10 G/15ML
20 SOLUTION ORAL
Status: DISCONTINUED | OUTPATIENT
Start: 2023-01-01 | End: 2023-01-01

## 2023-01-01 RX ORDER — LIDOCAINE HYDROCHLORIDE 20 MG/ML
JELLY TOPICAL PRN
Status: DISCONTINUED | OUTPATIENT
Start: 2023-01-01 | End: 2023-01-01

## 2023-01-01 RX ORDER — ALBUMIN (HUMAN) 12.5 G/50ML
12.5 SOLUTION INTRAVENOUS EVERY 8 HOURS
Status: DISCONTINUED | OUTPATIENT
Start: 2023-01-01 | End: 2023-01-01

## 2023-01-01 RX ORDER — HALOPERIDOL 5 MG/ML
1 INJECTION INTRAMUSCULAR ONCE
Status: COMPLETED | OUTPATIENT
Start: 2023-01-01 | End: 2023-01-01

## 2023-01-01 RX ORDER — HYDROMORPHONE HYDROCHLORIDE 1 MG/ML
1 SOLUTION ORAL
Status: DISCONTINUED | OUTPATIENT
Start: 2023-01-01 | End: 2023-01-01

## 2023-01-01 RX ORDER — HEPARIN SODIUM 5000 [USP'U]/ML
5000 INJECTION, SOLUTION INTRAVENOUS; SUBCUTANEOUS EVERY 8 HOURS SCHEDULED
Status: DISCONTINUED | OUTPATIENT
Start: 2023-01-01 | End: 2023-01-01

## 2023-01-01 RX ORDER — ALBUMIN (HUMAN) 12.5 G/50ML
25 SOLUTION INTRAVENOUS EVERY 6 HOURS
Status: COMPLETED | OUTPATIENT
Start: 2023-01-01 | End: 2023-01-01

## 2023-01-01 RX ORDER — MIDODRINE HYDROCHLORIDE 5 MG/1
10 TABLET ORAL
Status: DISCONTINUED | OUTPATIENT
Start: 2023-01-01 | End: 2023-01-01

## 2023-01-01 RX ORDER — ALBUMIN, HUMAN INJ 5% 5 %
25 SOLUTION INTRAVENOUS ONCE
Status: COMPLETED | OUTPATIENT
Start: 2023-01-01 | End: 2023-01-01

## 2023-01-01 RX ORDER — POTASSIUM CHLORIDE 7.45 MG/ML
10 INJECTION INTRAVENOUS
Status: COMPLETED | OUTPATIENT
Start: 2023-01-01 | End: 2023-01-01

## 2023-01-01 RX ORDER — PANTOPRAZOLE SODIUM 40 MG/1
40 TABLET, DELAYED RELEASE ORAL 2 TIMES DAILY
COMMUNITY

## 2023-01-01 RX ORDER — ONDANSETRON 2 MG/ML
4 INJECTION INTRAMUSCULAR; INTRAVENOUS EVERY 6 HOURS PRN
Status: DISCONTINUED | OUTPATIENT
Start: 2023-01-01 | End: 2023-01-01 | Stop reason: HOSPADM

## 2023-01-01 RX ORDER — LORAZEPAM 2 MG/ML
0.5 INJECTION INTRAMUSCULAR
Status: DISCONTINUED | OUTPATIENT
Start: 2023-01-01 | End: 2023-01-01 | Stop reason: HOSPADM

## 2023-01-01 RX ORDER — SODIUM CHLORIDE 9 MG/ML
INJECTION, SOLUTION INTRAVENOUS PRN
Status: DISCONTINUED | OUTPATIENT
Start: 2023-01-01 | End: 2023-01-01

## 2023-01-01 RX ORDER — SODIUM CHLORIDE 0.9 % (FLUSH) 0.9 %
5-40 SYRINGE (ML) INJECTION EVERY 12 HOURS SCHEDULED
Status: DISCONTINUED | OUTPATIENT
Start: 2023-01-01 | End: 2023-01-01 | Stop reason: HOSPADM

## 2023-01-01 RX ORDER — LIDOCAINE 4 G/G
1 PATCH TOPICAL DAILY
Status: DISCONTINUED | OUTPATIENT
Start: 2023-01-01 | End: 2023-01-01 | Stop reason: HOSPADM

## 2023-01-01 RX ORDER — MAGNESIUM SULFATE 1 G/100ML
1000 INJECTION INTRAVENOUS ONCE
Status: DISCONTINUED | OUTPATIENT
Start: 2023-01-01 | End: 2023-01-01

## 2023-01-01 RX ORDER — DEXTROSE MONOHYDRATE 100 MG/ML
INJECTION, SOLUTION INTRAVENOUS CONTINUOUS PRN
Status: DISCONTINUED | OUTPATIENT
Start: 2023-01-01 | End: 2023-01-01

## 2023-01-01 RX ADMIN — HEPARIN SODIUM 5000 UNITS: 5000 INJECTION INTRAVENOUS; SUBCUTANEOUS at 16:18

## 2023-01-01 RX ADMIN — ALBUMIN (HUMAN) 25 G: 0.25 INJECTION, SOLUTION INTRAVENOUS at 00:34

## 2023-01-01 RX ADMIN — LIDOCAINE HYDROCHLORIDE,EPINEPHRINE BITARTRATE 20 ML: 20; .005 INJECTION, SOLUTION EPIDURAL; INFILTRATION; INTRACAUDAL; PERINEURAL at 20:33

## 2023-01-01 RX ADMIN — MIDODRINE HYDROCHLORIDE 5 MG: 5 TABLET ORAL at 23:03

## 2023-01-01 RX ADMIN — HEPARIN SODIUM 5000 UNITS: 5000 INJECTION INTRAVENOUS; SUBCUTANEOUS at 05:07

## 2023-01-01 RX ADMIN — LACTULOSE 20 G: 20 SOLUTION ORAL at 02:50

## 2023-01-01 RX ADMIN — Medication 1 MG: at 21:09

## 2023-01-01 RX ADMIN — SODIUM BICARBONATE: 84 INJECTION, SOLUTION INTRAVENOUS at 09:46

## 2023-01-01 RX ADMIN — Medication: at 21:09

## 2023-01-01 RX ADMIN — Medication: at 00:33

## 2023-01-01 RX ADMIN — POTASSIUM CHLORIDE 10 MEQ: 7.46 INJECTION, SOLUTION INTRAVENOUS at 12:52

## 2023-01-01 RX ADMIN — MIDODRINE HYDROCHLORIDE 5 MG: 5 TABLET ORAL at 13:09

## 2023-01-01 RX ADMIN — SODIUM CHLORIDE 1000 MG: 900 INJECTION INTRAVENOUS at 20:43

## 2023-01-01 RX ADMIN — SODIUM BICARBONATE 50 MEQ: 84 INJECTION, SOLUTION INTRAVENOUS at 12:42

## 2023-01-01 RX ADMIN — SODIUM CHLORIDE 1000 MG: 900 INJECTION INTRAVENOUS at 22:29

## 2023-01-01 RX ADMIN — ALBUMIN (HUMAN) 25 G: 0.25 INJECTION, SOLUTION INTRAVENOUS at 17:45

## 2023-01-01 RX ADMIN — POTASSIUM CHLORIDE 10 MEQ: 7.46 INJECTION, SOLUTION INTRAVENOUS at 11:50

## 2023-01-01 RX ADMIN — ALBUMIN (HUMAN) 12.5 G: 0.25 INJECTION, SOLUTION INTRAVENOUS at 08:43

## 2023-01-01 RX ADMIN — Medication 0.5 MG: at 20:27

## 2023-01-01 RX ADMIN — HYDROMORPHONE HYDROCHLORIDE 0.5 MG: 1 INJECTION, SOLUTION INTRAMUSCULAR; INTRAVENOUS; SUBCUTANEOUS at 21:20

## 2023-01-01 RX ADMIN — HEPARIN SODIUM 5000 UNITS: 5000 INJECTION INTRAVENOUS; SUBCUTANEOUS at 22:32

## 2023-01-01 RX ADMIN — SODIUM BICARBONATE: 84 INJECTION, SOLUTION INTRAVENOUS at 21:37

## 2023-01-01 RX ADMIN — SODIUM CHLORIDE, PRESERVATIVE FREE 10 ML: 5 INJECTION INTRAVENOUS at 09:45

## 2023-01-01 RX ADMIN — MIDODRINE HYDROCHLORIDE 5 MG: 5 TABLET ORAL at 17:54

## 2023-01-01 RX ADMIN — SODIUM BICARBONATE 100 MEQ: 84 INJECTION, SOLUTION INTRAVENOUS at 12:14

## 2023-01-01 RX ADMIN — MIDODRINE HYDROCHLORIDE 5 MG: 5 TABLET ORAL at 08:36

## 2023-01-01 RX ADMIN — HALOPERIDOL LACTATE 1 MG: 5 INJECTION, SOLUTION INTRAMUSCULAR at 11:04

## 2023-01-01 RX ADMIN — SODIUM CHLORIDE: 9 INJECTION, SOLUTION INTRAVENOUS at 00:30

## 2023-01-01 RX ADMIN — SODIUM CHLORIDE, PRESERVATIVE FREE 10 ML: 5 INJECTION INTRAVENOUS at 21:37

## 2023-01-01 RX ADMIN — Medication: at 22:24

## 2023-01-01 RX ADMIN — SODIUM CHLORIDE, PRESERVATIVE FREE 10 ML: 5 INJECTION INTRAVENOUS at 00:33

## 2023-01-01 RX ADMIN — HEPARIN SODIUM 5000 UNITS: 5000 INJECTION INTRAVENOUS; SUBCUTANEOUS at 14:18

## 2023-01-01 RX ADMIN — LACTULOSE 20 G: 20 SOLUTION ORAL at 00:57

## 2023-01-01 RX ADMIN — Medication 16 G: at 07:50

## 2023-01-01 RX ADMIN — HEPARIN SODIUM 5000 UNITS: 5000 INJECTION INTRAVENOUS; SUBCUTANEOUS at 05:25

## 2023-01-01 RX ADMIN — LACTULOSE 20 G: 20 SOLUTION ORAL at 11:12

## 2023-01-01 RX ADMIN — HYDROMORPHONE HYDROCHLORIDE 0.5 MG: 1 INJECTION, SOLUTION INTRAMUSCULAR; INTRAVENOUS; SUBCUTANEOUS at 01:39

## 2023-01-01 RX ADMIN — LACTULOSE 20 G: 20 SOLUTION ORAL at 17:54

## 2023-01-01 RX ADMIN — HEPARIN SODIUM 5000 UNITS: 5000 INJECTION INTRAVENOUS; SUBCUTANEOUS at 22:27

## 2023-01-01 RX ADMIN — LACTULOSE 20 G: 20 SOLUTION ORAL at 22:32

## 2023-01-01 RX ADMIN — LACTULOSE 20 G: 20 SOLUTION ORAL at 22:28

## 2023-01-01 RX ADMIN — TRAMADOL HYDROCHLORIDE 50 MG: 50 TABLET ORAL at 15:06

## 2023-01-01 RX ADMIN — LACTULOSE 20 G: 20 SOLUTION ORAL at 04:25

## 2023-01-01 RX ADMIN — SODIUM CHLORIDE, PRESERVATIVE FREE 10 ML: 5 INJECTION INTRAVENOUS at 09:00

## 2023-01-01 RX ADMIN — LACTULOSE 20 G: 20 SOLUTION ORAL at 14:18

## 2023-01-01 RX ADMIN — TRAMADOL HYDROCHLORIDE 50 MG: 50 TABLET ORAL at 02:41

## 2023-01-01 RX ADMIN — SODIUM CHLORIDE 500 ML: 9 INJECTION, SOLUTION INTRAVENOUS at 20:12

## 2023-01-01 RX ADMIN — LACTULOSE 20 G: 20 SOLUTION ORAL at 08:36

## 2023-01-01 RX ADMIN — LIDOCAINE HYDROCHLORIDE 10 ML: 10 INJECTION, SOLUTION EPIDURAL; INFILTRATION; INTRACAUDAL; PERINEURAL at 16:22

## 2023-01-01 RX ADMIN — POTASSIUM BICARBONATE 40 MEQ: 782 TABLET, EFFERVESCENT ORAL at 08:36

## 2023-01-01 RX ADMIN — SODIUM BICARBONATE: 84 INJECTION, SOLUTION INTRAVENOUS at 04:16

## 2023-01-01 RX ADMIN — HYDROMORPHONE HYDROCHLORIDE 0.5 MG: 1 INJECTION, SOLUTION INTRAMUSCULAR; INTRAVENOUS; SUBCUTANEOUS at 15:20

## 2023-01-01 RX ADMIN — SODIUM CHLORIDE, PRESERVATIVE FREE 10 ML: 5 INJECTION INTRAVENOUS at 08:37

## 2023-01-01 RX ADMIN — ALBUMIN (HUMAN) 25 G: 12.5 INJECTION, SOLUTION INTRAVENOUS at 22:49

## 2023-01-01 RX ADMIN — Medication: at 08:37

## 2023-01-01 RX ADMIN — SODIUM CHLORIDE, PRESERVATIVE FREE 10 ML: 5 INJECTION INTRAVENOUS at 21:10

## 2023-01-01 RX ADMIN — Medication: at 09:52

## 2023-01-01 RX ADMIN — SODIUM CHLORIDE, POTASSIUM CHLORIDE, SODIUM LACTATE AND CALCIUM CHLORIDE: 600; 310; 30; 20 INJECTION, SOLUTION INTRAVENOUS at 08:32

## 2023-01-01 RX ADMIN — LACTULOSE 20 G: 20 SOLUTION ORAL at 00:33

## 2023-01-01 RX ADMIN — HEPARIN SODIUM 5000 UNITS: 5000 INJECTION INTRAVENOUS; SUBCUTANEOUS at 23:03

## 2023-01-01 RX ADMIN — MIDODRINE HYDROCHLORIDE 10 MG: 5 TABLET ORAL at 14:18

## 2023-01-01 RX ADMIN — TRAMADOL HYDROCHLORIDE 50 MG: 50 TABLET ORAL at 14:18

## 2023-01-01 RX ADMIN — MIDODRINE HYDROCHLORIDE 5 MG: 5 TABLET ORAL at 09:45

## 2023-01-01 RX ADMIN — Medication 1 MG: at 20:27

## 2023-01-01 RX ADMIN — ALBUMIN (HUMAN) 25 G: 0.25 INJECTION, SOLUTION INTRAVENOUS at 05:34

## 2023-01-01 RX ADMIN — HEPARIN SODIUM 5000 UNITS: 5000 INJECTION INTRAVENOUS; SUBCUTANEOUS at 05:32

## 2023-01-01 RX ADMIN — SODIUM CHLORIDE, PRESERVATIVE FREE 10 ML: 5 INJECTION INTRAVENOUS at 21:24

## 2023-01-01 RX ADMIN — Medication 0.5 MG: at 16:53

## 2023-01-01 RX ADMIN — SODIUM CHLORIDE, PRESERVATIVE FREE 10 ML: 5 INJECTION INTRAVENOUS at 09:51

## 2023-01-01 RX ADMIN — Medication: at 09:00

## 2023-01-01 RX ADMIN — ALBUMIN (HUMAN) 25 G: 0.25 INJECTION, SOLUTION INTRAVENOUS at 11:11

## 2023-01-01 RX ADMIN — ONDANSETRON 4 MG: 2 INJECTION INTRAMUSCULAR; INTRAVENOUS at 02:40

## 2023-01-01 ASSESSMENT — PAIN SCALES - GENERAL
PAINLEVEL_OUTOF10: 0
PAINLEVEL_OUTOF10: 0
PAINLEVEL_OUTOF10: 8
PAINLEVEL_OUTOF10: 0
PAINLEVEL_OUTOF10: 7
PAINLEVEL_OUTOF10: 0
PAINLEVEL_OUTOF10: 0
PAINLEVEL_OUTOF10: 3
PAINLEVEL_OUTOF10: 0
PAINLEVEL_OUTOF10: 7
PAINLEVEL_OUTOF10: 2
PAINLEVEL_OUTOF10: 0
PAINLEVEL_OUTOF10: 0
PAINLEVEL_OUTOF10: 7
PAINLEVEL_OUTOF10: 0
PAINLEVEL_OUTOF10: 9
PAINLEVEL_OUTOF10: 0

## 2023-01-01 ASSESSMENT — PAIN DESCRIPTION - PAIN TYPE
TYPE: ACUTE PAIN
TYPE: ACUTE PAIN

## 2023-01-01 ASSESSMENT — PAIN DESCRIPTION - DESCRIPTORS
DESCRIPTORS: DISCOMFORT
DESCRIPTORS: ACHING
DESCRIPTORS: SHARP

## 2023-01-01 ASSESSMENT — PAIN SCALES - WONG BAKER
WONGBAKER_NUMERICALRESPONSE: 0

## 2023-01-01 ASSESSMENT — PAIN DESCRIPTION - LOCATION
LOCATION: ABDOMEN
LOCATION: OTHER (COMMENT)
LOCATION: ABDOMEN

## 2023-01-01 ASSESSMENT — LIFESTYLE VARIABLES
HOW MANY STANDARD DRINKS CONTAINING ALCOHOL DO YOU HAVE ON A TYPICAL DAY: PATIENT DOES NOT DRINK
HOW OFTEN DO YOU HAVE A DRINK CONTAINING ALCOHOL: NEVER

## 2023-01-01 ASSESSMENT — PAIN DESCRIPTION - ORIENTATION
ORIENTATION: ANTERIOR
ORIENTATION: MID
ORIENTATION: ANTERIOR

## 2023-05-11 RX ORDER — LOSARTAN POTASSIUM 100 MG/1
1 TABLET ORAL DAILY
COMMUNITY
Start: 2021-06-01

## 2023-05-11 RX ORDER — FUROSEMIDE 40 MG/1
1 TABLET ORAL DAILY
COMMUNITY
Start: 2019-02-06

## 2023-05-11 RX ORDER — DILTIAZEM HYDROCHLORIDE 240 MG/1
CAPSULE, EXTENDED RELEASE ORAL
COMMUNITY

## 2023-05-11 RX ORDER — POTASSIUM CHLORIDE 20 MEQ/1
1 TABLET, EXTENDED RELEASE ORAL DAILY
COMMUNITY
Start: 2021-05-25

## 2023-09-22 ENCOUNTER — APPOINTMENT (OUTPATIENT)
Facility: HOSPITAL | Age: 73
DRG: 377 | End: 2023-09-22
Payer: MEDICARE

## 2023-09-22 ENCOUNTER — HOSPITAL ENCOUNTER (EMERGENCY)
Facility: HOSPITAL | Age: 73
Discharge: HOME | DRG: 377 | End: 2023-09-22
Attending: EMERGENCY MEDICINE
Payer: MEDICARE

## 2023-09-22 VITALS
WEIGHT: 239.42 LBS | RESPIRATION RATE: 14 BRPM | SYSTOLIC BLOOD PRESSURE: 141 MMHG | TEMPERATURE: 97.9 F | OXYGEN SATURATION: 100 % | HEART RATE: 63 BPM | HEIGHT: 71 IN | DIASTOLIC BLOOD PRESSURE: 69 MMHG | BODY MASS INDEX: 33.52 KG/M2

## 2023-09-22 DIAGNOSIS — N17.9 AKI (ACUTE KIDNEY INJURY) (HCC): Primary | ICD-10-CM

## 2023-09-22 LAB
ALBUMIN SERPL-MCNC: 3.8 G/DL (ref 3.5–5)
ALBUMIN/GLOB SERPL: 0.8 (ref 1.1–2.2)
ALP SERPL-CCNC: 284 U/L (ref 45–117)
ALT SERPL-CCNC: 212 U/L (ref 12–78)
ANION GAP SERPL CALC-SCNC: 8 MMOL/L (ref 5–15)
AST SERPL-CCNC: 575 U/L (ref 15–37)
BASOPHILS # BLD: 0 K/UL (ref 0–0.1)
BASOPHILS NFR BLD: 1 % (ref 0–1)
BILIRUB SERPL-MCNC: 3.4 MG/DL (ref 0.2–1)
BUN SERPL-MCNC: 56 MG/DL (ref 6–20)
BUN/CREAT SERPL: 20 (ref 12–20)
CALCIUM SERPL-MCNC: 9.3 MG/DL (ref 8.5–10.1)
CHLORIDE SERPL-SCNC: 109 MMOL/L (ref 97–108)
CO2 SERPL-SCNC: 21 MMOL/L (ref 21–32)
CREAT SERPL-MCNC: 2.79 MG/DL (ref 0.7–1.3)
DIFFERENTIAL METHOD BLD: ABNORMAL
EOSINOPHIL # BLD: 0.1 K/UL (ref 0–0.4)
EOSINOPHIL NFR BLD: 2 % (ref 0–7)
ERYTHROCYTE [DISTWIDTH] IN BLOOD BY AUTOMATED COUNT: 19.2 % (ref 11.5–14.5)
GLOBULIN SER CALC-MCNC: 4.5 G/DL (ref 2–4)
GLUCOSE SERPL-MCNC: 89 MG/DL (ref 65–100)
HCT VFR BLD AUTO: 33 % (ref 36.6–50.3)
HGB BLD-MCNC: 10.9 G/DL (ref 12.1–17)
IMM GRANULOCYTES # BLD AUTO: 0 K/UL (ref 0–0.04)
IMM GRANULOCYTES NFR BLD AUTO: 0 % (ref 0–0.5)
LYMPHOCYTES # BLD: 0.9 K/UL (ref 0.8–3.5)
LYMPHOCYTES NFR BLD: 13 % (ref 12–49)
MCH RBC QN AUTO: 27.8 PG (ref 26–34)
MCHC RBC AUTO-ENTMCNC: 33 G/DL (ref 30–36.5)
MCV RBC AUTO: 84.2 FL (ref 80–99)
MONOCYTES # BLD: 0.6 K/UL (ref 0–1)
MONOCYTES NFR BLD: 9 % (ref 5–13)
NEUTS SEG # BLD: 4.9 K/UL (ref 1.8–8)
NEUTS SEG NFR BLD: 74 % (ref 32–75)
NRBC # BLD: 0 K/UL (ref 0–0.01)
NRBC BLD-RTO: 0 PER 100 WBC
PLATELET # BLD AUTO: 222 K/UL (ref 150–400)
POTASSIUM SERPL-SCNC: 5.3 MMOL/L (ref 3.5–5.1)
PROT SERPL-MCNC: 8.3 G/DL (ref 6.4–8.2)
RBC # BLD AUTO: 3.92 M/UL (ref 4.1–5.7)
SODIUM SERPL-SCNC: 138 MMOL/L (ref 136–145)
WBC # BLD AUTO: 6.6 K/UL (ref 4.1–11.1)

## 2023-09-22 PROCEDURE — 85025 COMPLETE CBC W/AUTO DIFF WBC: CPT

## 2023-09-22 PROCEDURE — 36415 COLL VENOUS BLD VENIPUNCTURE: CPT

## 2023-09-22 PROCEDURE — 80053 COMPREHEN METABOLIC PANEL: CPT

## 2023-09-22 PROCEDURE — 70450 CT HEAD/BRAIN W/O DYE: CPT

## 2023-09-22 ASSESSMENT — PAIN SCALES - GENERAL: PAINLEVEL_OUTOF10: 8

## 2023-09-22 NOTE — ED PROVIDER NOTES
[facility] EMERGENCY DEPT  EMERGENCY DEPARTMENT HISTORY AND PHYSICAL EXAM      Date: 9/22/2023  Patient Name: Eleanor Bateman MRN: [id number]  Birthdate 1950  Date of evaluation: 9/22/2023  Provider: Erica Chawla MD   Note Started: 4:24 PM EDT 9/22/23    HISTORY OF PRESENT ILLNESS     Chief Complaint   Patient presents with    Skin Problem     For past couple of weeks has been having black dots on right chest that go all the way down to abdomen , that hurt but do not burn or itch. Saw primary doctor yesterday who advise pt to come to Ed. Pt also having frequent bowel movements all night long-for two weeks-he has been taking constipation medicine for this. No vomiting. Dr. Juancho Crisostomo also wanted pt 2 week history of right leg weakness checked for stroke,    Extremity Weakness     Two weeks       History Provided By: Patient    HPI: Eleanor Bateman is a 68 y.o. adult complains of back sounds in his chest for the past couple of weeks. He also says that his PCP sent him to the emergency room because he had some weakness on the right side of his leg for the past 2 weeks wanted to get a head CT. Patient states that he has had history with liver disease as well as kidney problems in the past but denies any instances this time. Additionally states that he is got some black spots on his chest and is concerned about what they might be. PAST MEDICAL HISTORY   Past Medical History:  Past Medical History:   Diagnosis Date    Arrhythmia     irregular heartbeat - resolved after using treadmill per pt - no medication    Chronic pain     left shoulder    Hyperlipidemia     Hypertension     Liver disease     hep C       Past Surgical History:  Past Surgical History:   Procedure Laterality Date    GI      11 inches of intestine removed - d/t polyp per pt    GI      colonoscopy/EGD       Family History:  No family history on file.     Social History:  Social History     Tobacco Use    Smoking status: Former     Packs/day: 1 MCHC 33.0 30.0 - 36.5 g/dL    RDW 19.2 (H) 11.5 - 14.5 %    Platelets 289 828 - 112 K/uL    Nucleated RBCs 0.0 0  WBC    nRBC 0.00 0.00 - 0.01 K/uL    Neutrophils % 74 32 - 75 %    Lymphocytes % 13 12 - 49 %    Monocytes % 9 5 - 13 %    Eosinophils % 2 0 - 7 %    Basophils % 1 0 - 1 %    Immature Granulocytes 0 0.0 - 0.5 %    Neutrophils Absolute 4.9 1.8 - 8.0 K/UL    Lymphocytes Absolute 0.9 0.8 - 3.5 K/UL    Monocytes Absolute 0.6 0.0 - 1.0 K/UL    Eosinophils Absolute 0.1 0.0 - 0.4 K/UL    Basophils Absolute 0.0 0.0 - 0.1 K/UL    Absolute Immature Granulocyte 0.0 0.00 - 0.04 K/UL    Differential Type AUTOMATED     Comprehensive Metabolic Panel    Collection Time: 09/22/23  2:36 PM   Result Value Ref Range    Sodium 138 136 - 145 mmol/L    Potassium 5.3 (H) 3.5 - 5.1 mmol/L    Chloride 109 (H) 97 - 108 mmol/L    CO2 21 21 - 32 mmol/L    Anion Gap 8 5 - 15 mmol/L    Glucose 89 65 - 100 mg/dL    BUN 56 (H) 6 - 20 MG/DL    Creatinine 2.79 (H) 0.70 - 1.30 MG/DL    Bun/Cre Ratio 20 12 - 20      Est, Glom Filt Rate 23 (L) >60 ml/min/1.73m2    Calcium 9.3 8.5 - 10.1 MG/DL    Total Bilirubin 3.4 (H) 0.2 - 1.0 MG/DL     (H) 12 - 78 U/L     (H) 15 - 37 U/L    Alk Phosphatase 284 (H) 45 - 117 U/L    Total Protein 8.3 (H) 6.4 - 8.2 g/dL    Albumin 3.8 3.5 - 5.0 g/dL    Globulin 4.5 (H) 2.0 - 4.0 g/dL    Albumin/Globulin Ratio 0.8 (L) 1.1 - 2.2         EKG:. Not Applicable    Radiologic Studies:  Non-plain film images such as CT, Ultrasound and MRI are read by the radiologist. Plain radiographic images are visualized and preliminarily interpreted by the ED Provider with the following findings: CT Interpreted by me.   Shows no appreciable acute process    Interpretation per the Radiologist below, if available at the time of this note:  CT HEAD WO CONTRAST   Final Result   No acute process seen                 EMERGENCY DEPARTMENT COURSE and DIFFERENTIAL DIAGNOSIS/MDM   CC/HPI Summary, DDx, ED Course, and

## 2023-09-22 NOTE — ED NOTES
Pt discharged by [facility]. Discharge instructions discussed and pt given opportunity to ask questions. Pt ambulatory out of ED.         Citlali Castillo RN  09/22/23 0782

## 2023-09-25 ENCOUNTER — HOSPITAL ENCOUNTER (INPATIENT)
Facility: HOSPITAL | Age: 73
LOS: 8 days | Discharge: HOME HEALTH | DRG: 377 | End: 2023-10-03
Attending: STUDENT IN AN ORGANIZED HEALTH CARE EDUCATION/TRAINING PROGRAM | Admitting: STUDENT IN AN ORGANIZED HEALTH CARE EDUCATION/TRAINING PROGRAM
Payer: MEDICARE

## 2023-09-25 ENCOUNTER — APPOINTMENT (OUTPATIENT)
Facility: HOSPITAL | Age: 73
DRG: 377 | End: 2023-09-25
Payer: MEDICARE

## 2023-09-25 DIAGNOSIS — N17.9 AKI (ACUTE KIDNEY INJURY) (HCC): ICD-10-CM

## 2023-09-25 DIAGNOSIS — I81 PORTAL VEIN THROMBOSIS: Primary | ICD-10-CM

## 2023-09-25 PROBLEM — R74.01 TRANSAMINITIS: Status: ACTIVE | Noted: 2023-09-25

## 2023-09-25 LAB
ALBUMIN SERPL-MCNC: 4.1 G/DL (ref 3.5–5)
ALBUMIN/GLOB SERPL: 0.8 (ref 1.1–2.2)
ALP SERPL-CCNC: 304 U/L (ref 45–117)
ALT SERPL-CCNC: 319 U/L (ref 12–78)
AMMONIA PLAS-SCNC: <10 UMOL/L
ANION GAP SERPL CALC-SCNC: 11 MMOL/L (ref 5–15)
APTT PPP: 28.3 SEC (ref 22.1–31)
AST SERPL-CCNC: 960 U/L (ref 15–37)
BASOPHILS # BLD: 0 K/UL (ref 0–0.1)
BASOPHILS NFR BLD: 0 % (ref 0–1)
BILIRUB SERPL-MCNC: 5 MG/DL (ref 0.2–1)
BUN SERPL-MCNC: 68 MG/DL (ref 6–20)
BUN/CREAT SERPL: 17 (ref 12–20)
CALCIUM SERPL-MCNC: 10.2 MG/DL (ref 8.5–10.1)
CHLORIDE SERPL-SCNC: 104 MMOL/L (ref 97–108)
CO2 SERPL-SCNC: 19 MMOL/L (ref 21–32)
CREAT SERPL-MCNC: 3.91 MG/DL (ref 0.7–1.3)
DIFFERENTIAL METHOD BLD: ABNORMAL
EOSINOPHIL # BLD: 0 K/UL (ref 0–0.4)
EOSINOPHIL NFR BLD: 0 % (ref 0–7)
ERYTHROCYTE [DISTWIDTH] IN BLOOD BY AUTOMATED COUNT: 19.7 % (ref 11.5–14.5)
GLOBULIN SER CALC-MCNC: 5.1 G/DL (ref 2–4)
GLUCOSE SERPL-MCNC: 83 MG/DL (ref 65–100)
HCT VFR BLD AUTO: 33.5 % (ref 36.6–50.3)
HGB BLD-MCNC: 11.2 G/DL (ref 12.1–17)
IMM GRANULOCYTES # BLD AUTO: 0 K/UL (ref 0–0.04)
IMM GRANULOCYTES NFR BLD AUTO: 1 % (ref 0–0.5)
INR PPP: 1.4 (ref 0.9–1.1)
LIPASE SERPL-CCNC: 81 U/L (ref 73–393)
LYMPHOCYTES # BLD: 0.8 K/UL (ref 0.8–3.5)
LYMPHOCYTES NFR BLD: 10 % (ref 12–49)
MCH RBC QN AUTO: 28.1 PG (ref 26–34)
MCHC RBC AUTO-ENTMCNC: 33.4 G/DL (ref 30–36.5)
MCV RBC AUTO: 84.2 FL (ref 80–99)
MONOCYTES # BLD: 1 K/UL (ref 0–1)
MONOCYTES NFR BLD: 12 % (ref 5–13)
NEUTS SEG # BLD: 6.6 K/UL (ref 1.8–8)
NEUTS SEG NFR BLD: 77 % (ref 32–75)
NRBC # BLD: 0 K/UL (ref 0–0.01)
NRBC BLD-RTO: 0 PER 100 WBC
PLATELET # BLD AUTO: 221 K/UL (ref 150–400)
PMV BLD AUTO: 12.1 FL (ref 8.9–12.9)
POTASSIUM SERPL-SCNC: 4.7 MMOL/L (ref 3.5–5.1)
PROT SERPL-MCNC: 9.2 G/DL (ref 6.4–8.2)
PROTHROMBIN TIME: 14.6 SEC (ref 9–11.1)
RBC # BLD AUTO: 3.98 M/UL (ref 4.1–5.7)
SODIUM SERPL-SCNC: 134 MMOL/L (ref 136–145)
THERAPEUTIC RANGE: NORMAL SECS (ref 58–77)
UFH PPP CHRO-ACNC: 0.12 IU/ML
WBC # BLD AUTO: 8.5 K/UL (ref 4.1–11.1)

## 2023-09-25 PROCEDURE — 74176 CT ABD & PELVIS W/O CONTRAST: CPT

## 2023-09-25 PROCEDURE — 6360000002 HC RX W HCPCS: Performed by: STUDENT IN AN ORGANIZED HEALTH CARE EDUCATION/TRAINING PROGRAM

## 2023-09-25 PROCEDURE — 85730 THROMBOPLASTIN TIME PARTIAL: CPT

## 2023-09-25 PROCEDURE — 85025 COMPLETE CBC W/AUTO DIFF WBC: CPT

## 2023-09-25 PROCEDURE — 1100000003 HC PRIVATE W/ TELEMETRY

## 2023-09-25 PROCEDURE — 76705 ECHO EXAM OF ABDOMEN: CPT

## 2023-09-25 PROCEDURE — 76770 US EXAM ABDO BACK WALL COMP: CPT

## 2023-09-25 PROCEDURE — 99285 EMERGENCY DEPT VISIT HI MDM: CPT

## 2023-09-25 PROCEDURE — 36415 COLL VENOUS BLD VENIPUNCTURE: CPT

## 2023-09-25 PROCEDURE — 83690 ASSAY OF LIPASE: CPT

## 2023-09-25 PROCEDURE — 82140 ASSAY OF AMMONIA: CPT

## 2023-09-25 PROCEDURE — 85610 PROTHROMBIN TIME: CPT

## 2023-09-25 PROCEDURE — 80053 COMPREHEN METABOLIC PANEL: CPT

## 2023-09-25 PROCEDURE — 85520 HEPARIN ASSAY: CPT

## 2023-09-25 PROCEDURE — 2580000003 HC RX 258: Performed by: STUDENT IN AN ORGANIZED HEALTH CARE EDUCATION/TRAINING PROGRAM

## 2023-09-25 RX ORDER — POLYETHYLENE GLYCOL 3350 17 G/17G
17 POWDER, FOR SOLUTION ORAL DAILY PRN
Status: DISCONTINUED | OUTPATIENT
Start: 2023-09-25 | End: 2023-10-03 | Stop reason: HOSPADM

## 2023-09-25 RX ORDER — HEPARIN SODIUM 10000 [USP'U]/100ML
5-30 INJECTION, SOLUTION INTRAVENOUS CONTINUOUS
Status: DISCONTINUED | OUTPATIENT
Start: 2023-09-25 | End: 2023-09-27

## 2023-09-25 RX ORDER — LABETALOL HYDROCHLORIDE 5 MG/ML
10 INJECTION, SOLUTION INTRAVENOUS EVERY 6 HOURS PRN
Status: DISCONTINUED | OUTPATIENT
Start: 2023-09-25 | End: 2023-10-03 | Stop reason: HOSPADM

## 2023-09-25 RX ORDER — ACETAMINOPHEN 650 MG/1
650 SUPPOSITORY RECTAL EVERY 6 HOURS PRN
Status: DISCONTINUED | OUTPATIENT
Start: 2023-09-25 | End: 2023-10-03 | Stop reason: HOSPADM

## 2023-09-25 RX ORDER — ONDANSETRON 4 MG/1
4 TABLET, ORALLY DISINTEGRATING ORAL EVERY 8 HOURS PRN
Status: DISCONTINUED | OUTPATIENT
Start: 2023-09-25 | End: 2023-10-03 | Stop reason: HOSPADM

## 2023-09-25 RX ORDER — ONDANSETRON 2 MG/ML
4 INJECTION INTRAMUSCULAR; INTRAVENOUS EVERY 6 HOURS PRN
Status: DISCONTINUED | OUTPATIENT
Start: 2023-09-25 | End: 2023-10-03 | Stop reason: HOSPADM

## 2023-09-25 RX ORDER — OXYCODONE HYDROCHLORIDE 5 MG/1
5 TABLET ORAL EVERY 4 HOURS PRN
Status: DISCONTINUED | OUTPATIENT
Start: 2023-09-25 | End: 2023-10-03 | Stop reason: HOSPADM

## 2023-09-25 RX ORDER — HEPARIN SODIUM 1000 [USP'U]/ML
80 INJECTION, SOLUTION INTRAVENOUS; SUBCUTANEOUS PRN
Status: DISCONTINUED | OUTPATIENT
Start: 2023-09-25 | End: 2023-09-27

## 2023-09-25 RX ORDER — SODIUM CHLORIDE, SODIUM LACTATE, POTASSIUM CHLORIDE, CALCIUM CHLORIDE 600; 310; 30; 20 MG/100ML; MG/100ML; MG/100ML; MG/100ML
INJECTION, SOLUTION INTRAVENOUS CONTINUOUS
Status: DISCONTINUED | OUTPATIENT
Start: 2023-09-25 | End: 2023-09-29

## 2023-09-25 RX ORDER — HYDRALAZINE HYDROCHLORIDE 20 MG/ML
10 INJECTION INTRAMUSCULAR; INTRAVENOUS EVERY 6 HOURS PRN
Status: DISCONTINUED | OUTPATIENT
Start: 2023-09-25 | End: 2023-10-03 | Stop reason: HOSPADM

## 2023-09-25 RX ORDER — HEPARIN SODIUM 1000 [USP'U]/ML
40 INJECTION, SOLUTION INTRAVENOUS; SUBCUTANEOUS PRN
Status: DISCONTINUED | OUTPATIENT
Start: 2023-09-25 | End: 2023-09-27

## 2023-09-25 RX ORDER — HEPARIN SODIUM 1000 [USP'U]/ML
80 INJECTION, SOLUTION INTRAVENOUS; SUBCUTANEOUS ONCE
Status: COMPLETED | OUTPATIENT
Start: 2023-09-25 | End: 2023-09-25

## 2023-09-25 RX ORDER — SODIUM CHLORIDE 9 MG/ML
INJECTION, SOLUTION INTRAVENOUS PRN
Status: DISCONTINUED | OUTPATIENT
Start: 2023-09-25 | End: 2023-10-03 | Stop reason: HOSPADM

## 2023-09-25 RX ORDER — ONDANSETRON 2 MG/ML
4 INJECTION INTRAMUSCULAR; INTRAVENOUS EVERY 4 HOURS PRN
Status: DISCONTINUED | OUTPATIENT
Start: 2023-09-25 | End: 2023-09-25

## 2023-09-25 RX ORDER — SODIUM CHLORIDE 0.9 % (FLUSH) 0.9 %
5-40 SYRINGE (ML) INJECTION PRN
Status: DISCONTINUED | OUTPATIENT
Start: 2023-09-25 | End: 2023-10-03 | Stop reason: HOSPADM

## 2023-09-25 RX ORDER — OXYCODONE HYDROCHLORIDE 5 MG/1
5 TABLET ORAL EVERY 4 HOURS PRN
Status: DISCONTINUED | OUTPATIENT
Start: 2023-09-25 | End: 2023-09-25

## 2023-09-25 RX ORDER — ACETAMINOPHEN 325 MG/1
650 TABLET ORAL EVERY 6 HOURS PRN
Status: DISCONTINUED | OUTPATIENT
Start: 2023-09-25 | End: 2023-10-03 | Stop reason: HOSPADM

## 2023-09-25 RX ORDER — FUROSEMIDE 40 MG/1
40 TABLET ORAL DAILY
Status: DISCONTINUED | OUTPATIENT
Start: 2023-09-25 | End: 2023-10-03 | Stop reason: HOSPADM

## 2023-09-25 RX ORDER — OXYCODONE HYDROCHLORIDE 5 MG/1
2.5 TABLET ORAL EVERY 4 HOURS PRN
Status: DISCONTINUED | OUTPATIENT
Start: 2023-09-25 | End: 2023-10-03 | Stop reason: HOSPADM

## 2023-09-25 RX ORDER — ACETAMINOPHEN 325 MG/1
650 TABLET ORAL EVERY 4 HOURS PRN
Status: DISCONTINUED | OUTPATIENT
Start: 2023-09-25 | End: 2023-09-25

## 2023-09-25 RX ORDER — SODIUM CHLORIDE 0.9 % (FLUSH) 0.9 %
5-40 SYRINGE (ML) INJECTION EVERY 12 HOURS SCHEDULED
Status: DISCONTINUED | OUTPATIENT
Start: 2023-09-25 | End: 2023-10-03 | Stop reason: HOSPADM

## 2023-09-25 RX ORDER — LOSARTAN POTASSIUM 100 MG/1
100 TABLET ORAL DAILY
Status: DISCONTINUED | OUTPATIENT
Start: 2023-09-25 | End: 2023-10-02

## 2023-09-25 RX ADMIN — HEPARIN SODIUM 8580 UNITS: 1000 INJECTION INTRAVENOUS; SUBCUTANEOUS at 18:46

## 2023-09-25 RX ADMIN — SODIUM CHLORIDE, PRESERVATIVE FREE 10 ML: 5 INJECTION INTRAVENOUS at 22:54

## 2023-09-25 RX ADMIN — SODIUM CHLORIDE, POTASSIUM CHLORIDE, SODIUM LACTATE AND CALCIUM CHLORIDE: 600; 310; 30; 20 INJECTION, SOLUTION INTRAVENOUS at 20:17

## 2023-09-25 RX ADMIN — HEPARIN SODIUM AND DEXTROSE 18 UNITS/KG/HR: 10000; 5 INJECTION INTRAVENOUS at 18:47

## 2023-09-25 ASSESSMENT — PAIN SCALES - GENERAL: PAINLEVEL_OUTOF10: 0

## 2023-09-25 NOTE — H&P
the right lobe as well. GALLBLADDER: Solitary small gallstone. Mild thickening of the gallbladder fundus. SPLEEN: No enlargement or lesion. PANCREAS: No mass or ductal dilatation. ADRENALS: No mass. KIDNEYS: No nephrolithiasis or hydronephrosis. GI TRACT:  Previous right hemicolectomy with anastomosis in the right upper quadrant. No bowel obstruction. Oral contrast was not administered. Mild wall thickening of the gastric antrum and duodenum may be due to underdistention. PERITONEUM: Small amount of free fluid around the liver and right paracolic gutter. No free air. APPENDIX: Surgically absent. RETROPERITONEUM: No aortic aneurysm. LYMPH NODES:  None enlarged. ADDITIONAL COMMENTS: Fat-containing umbilical hernia and several fat-containing supraumbilical ventral hernias. URINARY BLADDER: Unremarkable. REPRODUCTIVE ORGANS: Unremarkable. LYMPH NODES:  None enlarged. FREE FLUID:  None. BONES: No destructive bone lesion. ADDITIONAL COMMENTS: N/A.     1. Cirrhotic morphology of the liver with areas of hypodensity particularly in the left lobe and caudate, and to a lesser degree right lobe. Recommend contrast-enhanced examination given concern for metastatic disease. 2. Small volume ascites right upper quadrant. 3. 12 mm right cardiophrenic angle lymph node. 4. Previous right hemicolectomy. No bowel obstruction. Wall thickening of the gastric antrum and duodenum may be due to underdistention. 5. Mild wall thickening of the gallbladder fundus, nonspecific. Solitary small gallstone. CT HEAD WO CONTRAST    Result Date: 9/22/2023  EXAM: CT HEAD WO CONTRAST INDICATION: acute process COMPARISON: None. CONTRAST: None. TECHNIQUE: Unenhanced CT of the head was performed using 5 mm images. Brain and bone windows were generated. Coronal and sagittal reformats. CT dose reduction was achieved through use of a standardized protocol tailored for this examination and automatic exposure control for dose modulation.   FINDINGS: The

## 2023-09-25 NOTE — ED NOTES
RN attempted to give report to receiving unit. RN informed no one available to take report at this time.       Juju Peres RN  09/25/23 1423

## 2023-09-25 NOTE — ED NOTES
RN attempted to give report. RN was told receiving unit would call back in 5 minutes.      Kasie Eagle RN  09/25/23 [id number]

## 2023-09-25 NOTE — ED PROVIDER NOTES
[facility] EMERGENCY DEPT  EMERGENCY DEPARTMENT ENCOUNTER       Pt Name: Kayce Engel MRN: [id number]  Birthdate 1950  Date of evaluation: 9/25/2023  Provider: Andie Lozano MD   PCP: Asa Gayle MD  Note Started: 3:41 PM 9/25/23     CHIEF COMPLAINT       Chief Complaint   Patient presents with    Abnormal Lab     Pt has returned to ED per ER Dr. Silke Santana to get blood work rechecked. Pt pain is right side for elevated LFTs    Side Pain     Right side pain. Pt reports the pain comes and goes and currently in No pain        HISTORY OF PRESENT ILLNESS: 1 or more elements      History From: Patient  None     Kayce Engel is a 68 y.o. adult who presents to the emergency department for reevaluation after he was found to have abnormal labs at a visit 3 days ago. Patient had initially been sent to the emergency department for evaluation of 2 weeks of right lower extremity weakness. Patient states that the weakness comes and goes but his primary care doctor wanted him to be evaluated to rule out a stroke. Patient was incidentally found to have JOANIE on CKD as well as abnormal liver enzymes. Patient does report some intermittent upper abdominal pain, he states he does not have pain currently. Pain is usually in the epigastric area and seems to come and go without any exacerbating or relieving factors. Pain is not associated with eating. Patient denies associated fevers, confusion, vomiting, shortness of breath, leg swelling. No new numbness/weakness/gait disturbances. Patient does report losing 38 pounds in the past 3 months. Patient has prior history of hepatitis C and IV drug use, states he has not used IV drugs in 17 years and underwent a treatment course for hepatitis C, patient states he was cured 5 to 10 years ago. Nursing Notes were all reviewed and agreed with or any disagreements were addressed in the HPI.      REVIEW OF SYSTEMS      Review of Systems     Positives and Pertinent negatives as per

## 2023-09-26 ENCOUNTER — APPOINTMENT (OUTPATIENT)
Facility: HOSPITAL | Age: 73
DRG: 377 | End: 2023-09-26
Payer: MEDICARE

## 2023-09-26 LAB
ALBUMIN SERPL-MCNC: 3.9 G/DL (ref 3.5–5)
ALBUMIN/GLOB SERPL: 0.8 (ref 1.1–2.2)
ALP SERPL-CCNC: 280 U/L (ref 45–117)
ALT SERPL-CCNC: 290 U/L (ref 12–78)
AMORPH CRY URNS QL MICRO: ABNORMAL
ANION GAP SERPL CALC-SCNC: 12 MMOL/L (ref 5–15)
APPEARANCE UR: ABNORMAL
AST SERPL-CCNC: 878 U/L (ref 15–37)
BACTERIA URNS QL MICRO: NEGATIVE /HPF
BASOPHILS # BLD: 0 K/UL (ref 0–0.1)
BASOPHILS NFR BLD: 0 % (ref 0–1)
BILIRUB SERPL-MCNC: 5 MG/DL (ref 0.2–1)
BILIRUB UR QL CFM: POSITIVE
BUN SERPL-MCNC: 73 MG/DL (ref 6–20)
BUN/CREAT SERPL: 18 (ref 12–20)
C COLI+JEJUNI TUF STL QL NAA+PROBE: NEGATIVE
CALCIUM SERPL-MCNC: 10 MG/DL (ref 8.5–10.1)
CHLORIDE SERPL-SCNC: 104 MMOL/L (ref 97–108)
CO2 SERPL-SCNC: 18 MMOL/L (ref 21–32)
COLOR UR: ABNORMAL
CREAT SERPL-MCNC: 4.01 MG/DL (ref 0.7–1.3)
CREAT UR-MCNC: 149 MG/DL
CREAT UR-MCNC: 238 MG/DL
DIFFERENTIAL METHOD BLD: ABNORMAL
EC STX1+STX2 GENES STL QL NAA+PROBE: NEGATIVE
EOSINOPHIL # BLD: 0.1 K/UL (ref 0–0.4)
EOSINOPHIL NFR BLD: 1 % (ref 0–7)
EPITH CASTS URNS QL MICRO: ABNORMAL /LPF
ERYTHROCYTE [DISTWIDTH] IN BLOOD BY AUTOMATED COUNT: 19.9 % (ref 11.5–14.5)
ETEC ELTA+ESTB GENES STL QL NAA+PROBE: NEGATIVE
GLOBULIN SER CALC-MCNC: 4.7 G/DL (ref 2–4)
GLUCOSE SERPL-MCNC: 93 MG/DL (ref 65–100)
GLUCOSE UR STRIP.AUTO-MCNC: NEGATIVE MG/DL
GRAN CASTS URNS QL MICRO: ABNORMAL /LPF
HCT VFR BLD AUTO: 31.3 % (ref 36.6–50.3)
HGB BLD-MCNC: 10.4 G/DL (ref 12.1–17)
HGB UR QL STRIP: ABNORMAL
IMM GRANULOCYTES # BLD AUTO: 0 K/UL (ref 0–0.04)
IMM GRANULOCYTES NFR BLD AUTO: 1 % (ref 0–0.5)
KETONES UR QL STRIP.AUTO: ABNORMAL MG/DL
LEUKOCYTE ESTERASE UR QL STRIP.AUTO: ABNORMAL
LYMPHOCYTES # BLD: 1.1 K/UL (ref 0.8–3.5)
LYMPHOCYTES NFR BLD: 13 % (ref 12–49)
MCH RBC QN AUTO: 27.7 PG (ref 26–34)
MCHC RBC AUTO-ENTMCNC: 33.2 G/DL (ref 30–36.5)
MCV RBC AUTO: 83.5 FL (ref 80–99)
MONOCYTES # BLD: 1 K/UL (ref 0–1)
MONOCYTES NFR BLD: 12 % (ref 5–13)
NEUTS SEG # BLD: 6.1 K/UL (ref 1.8–8)
NEUTS SEG NFR BLD: 73 % (ref 32–75)
NITRITE UR QL STRIP.AUTO: NEGATIVE
NRBC # BLD: 0 K/UL (ref 0–0.01)
NRBC BLD-RTO: 0 PER 100 WBC
P SHIGELLOIDES DNA STL QL NAA+PROBE: NEGATIVE
PH UR STRIP: 5.5 (ref 5–8)
PLATELET # BLD AUTO: 205 K/UL (ref 150–400)
PMV BLD AUTO: 12.3 FL (ref 8.9–12.9)
POTASSIUM SERPL-SCNC: 4.8 MMOL/L (ref 3.5–5.1)
PROT SERPL-MCNC: 8.6 G/DL (ref 6.4–8.2)
PROT UR STRIP-MCNC: 100 MG/DL
PROT UR-MCNC: 86 MG/DL (ref 0–11.9)
PROT/CREAT UR-RTO: 0.6
RBC # BLD AUTO: 3.75 M/UL (ref 4.1–5.7)
RBC #/AREA URNS HPF: ABNORMAL /HPF (ref 0–5)
SALMONELLA SP SPAO STL QL NAA+PROBE: NEGATIVE
SHIGELLA SP+EIEC IPAH STL QL NAA+PROBE: NEGATIVE
SODIUM SERPL-SCNC: 134 MMOL/L (ref 136–145)
SODIUM UR-SCNC: 34 MMOL/L
SP GR UR REFRACTOMETRY: 1.02
UFH PPP CHRO-ACNC: 0.72 IU/ML
UFH PPP CHRO-ACNC: 0.89 IU/ML
UFH PPP CHRO-ACNC: 1.12 IU/ML
URINE CULTURE IF INDICATED: ABNORMAL
UROBILINOGEN UR QL STRIP.AUTO: 2 EU/DL (ref 0.2–1)
V CHOL+PARA+VUL DNA STL QL NAA+NON-PROBE: NEGATIVE
WBC # BLD AUTO: 8.3 K/UL (ref 4.1–11.1)
WBC URNS QL MICRO: ABNORMAL /HPF (ref 0–4)
Y ENTEROCOL DNA STL QL NAA+NON-PROBE: NEGATIVE

## 2023-09-26 PROCEDURE — 2580000003 HC RX 258: Performed by: STUDENT IN AN ORGANIZED HEALTH CARE EDUCATION/TRAINING PROGRAM

## 2023-09-26 PROCEDURE — 86803 HEPATITIS C AB TEST: CPT

## 2023-09-26 PROCEDURE — 82570 ASSAY OF URINE CREATININE: CPT

## 2023-09-26 PROCEDURE — 87522 HEPATITIS C REVRS TRNSCRPJ: CPT

## 2023-09-26 PROCEDURE — 87086 URINE CULTURE/COLONY COUNT: CPT

## 2023-09-26 PROCEDURE — A9579 GAD-BASE MR CONTRAST NOS,1ML: HCPCS | Performed by: STUDENT IN AN ORGANIZED HEALTH CARE EDUCATION/TRAINING PROGRAM

## 2023-09-26 PROCEDURE — 85025 COMPLETE CBC W/AUTO DIFF WBC: CPT

## 2023-09-26 PROCEDURE — 87506 IADNA-DNA/RNA PROBE TQ 6-11: CPT

## 2023-09-26 PROCEDURE — 6360000004 HC RX CONTRAST MEDICATION: Performed by: STUDENT IN AN ORGANIZED HEALTH CARE EDUCATION/TRAINING PROGRAM

## 2023-09-26 PROCEDURE — 80053 COMPREHEN METABOLIC PANEL: CPT

## 2023-09-26 PROCEDURE — 36415 COLL VENOUS BLD VENIPUNCTURE: CPT

## 2023-09-26 PROCEDURE — 80074 ACUTE HEPATITIS PANEL: CPT

## 2023-09-26 PROCEDURE — 74183 MRI ABD W/O CNTR FLWD CNTR: CPT

## 2023-09-26 PROCEDURE — 84156 ASSAY OF PROTEIN URINE: CPT

## 2023-09-26 PROCEDURE — 84300 ASSAY OF URINE SODIUM: CPT

## 2023-09-26 PROCEDURE — 81001 URINALYSIS AUTO W/SCOPE: CPT

## 2023-09-26 PROCEDURE — 1100000003 HC PRIVATE W/ TELEMETRY

## 2023-09-26 PROCEDURE — 6360000002 HC RX W HCPCS: Performed by: STUDENT IN AN ORGANIZED HEALTH CARE EDUCATION/TRAINING PROGRAM

## 2023-09-26 PROCEDURE — 85520 HEPARIN ASSAY: CPT

## 2023-09-26 RX ADMIN — SODIUM CHLORIDE, PRESERVATIVE FREE 10 ML: 5 INJECTION INTRAVENOUS at 20:51

## 2023-09-26 RX ADMIN — SODIUM CHLORIDE, PRESERVATIVE FREE 10 ML: 5 INJECTION INTRAVENOUS at 09:45

## 2023-09-26 RX ADMIN — SODIUM CHLORIDE, POTASSIUM CHLORIDE, SODIUM LACTATE AND CALCIUM CHLORIDE: 600; 310; 30; 20 INJECTION, SOLUTION INTRAVENOUS at 11:47

## 2023-09-26 RX ADMIN — GADOTERIDOL 20 ML: 279.3 INJECTION, SOLUTION INTRAVENOUS at 19:49

## 2023-09-26 RX ADMIN — HEPARIN SODIUM AND DEXTROSE 13.15 UNITS/KG/HR: 10000; 5 INJECTION INTRAVENOUS at 20:47

## 2023-09-26 RX ADMIN — HEPARIN SODIUM AND DEXTROSE 15.02 UNITS/KG/HR: 10000; 5 INJECTION INTRAVENOUS at 07:53

## 2023-09-26 ASSESSMENT — PAIN SCALES - GENERAL: PAINLEVEL_OUTOF10: 0

## 2023-09-26 NOTE — CARE COORDINATION
Care Management Initial Assessment       RUR: not listed  Readmission? No  1st IM letter given? Yes -   1st  letter given: No     CM completed assessment w/pt. No history of HH/Rehab. Patient just started using a cane. Patient uses [employer] on [employer]. Family will transport pt at d/c. No needs or concerns identified. 09/26/23 1214   Service Assessment   Patient Orientation Alert and Oriented   Cognition Alert   History Provided By Patient   Primary Caregiver Self   Support Systems Spouse/Significant Other;Family Members;Friends/Neighbors  (pt has large support system)   PCP Verified by CM Yes   Last Visit to PCP Within last 6 months   Prior Functional Level Independent in ADLs/IADLs   Current Functional Level Independent in ADLs/IADLs   Can patient return to prior living arrangement Yes   Family able to assist with home care needs: Yes   Would you like for me to discuss the discharge plan with any other family members/significant others, and if so, who?  No   Financial Resources Medicare   Community Resources None   Social/Functional History   Lives With Spouse   Type of Home House  (two story home w/4 STE)   Home Equipment Satin   Active  Yes     Apoorva Recinos  Ext [phone]

## 2023-09-26 NOTE — PLAN OF CARE
Problem: Discharge Planning  Goal: Discharge to home or other facility with appropriate resources  Outcome: Progressing  Flowsheets (Taken 9/25/2023 9594)  Discharge to home or other facility with appropriate resources:   Identify barriers to discharge with patient and caregiver   Arrange for needed discharge resources and transportation as appropriate   Identify discharge learning needs (meds, wound care, etc)     Problem: Safety - Adult  Goal: Free from fall injury  Outcome: Progressing     Problem: ABCDS Injury Assessment  Goal: Absence of physical injury  Outcome: Progressing

## 2023-09-27 ENCOUNTER — ANESTHESIA EVENT (OUTPATIENT)
Facility: HOSPITAL | Age: 73
End: 2023-09-27
Payer: MEDICARE

## 2023-09-27 ENCOUNTER — APPOINTMENT (OUTPATIENT)
Facility: HOSPITAL | Age: 73
DRG: 377 | End: 2023-09-27
Attending: STUDENT IN AN ORGANIZED HEALTH CARE EDUCATION/TRAINING PROGRAM
Payer: MEDICARE

## 2023-09-27 ENCOUNTER — ANESTHESIA (OUTPATIENT)
Facility: HOSPITAL | Age: 73
End: 2023-09-27
Payer: MEDICARE

## 2023-09-27 LAB
-: ABNORMAL
ALBUMIN SERPL-MCNC: 3.4 G/DL (ref 3.5–5)
ALBUMIN SERPL-MCNC: 3.5 G/DL (ref 3.5–5)
ALBUMIN/GLOB SERPL: 0.8 (ref 1.1–2.2)
ALP SERPL-CCNC: 278 U/L (ref 45–117)
ALT SERPL-CCNC: 609 U/L (ref 12–78)
ANION GAP SERPL CALC-SCNC: 9 MMOL/L (ref 5–15)
AST SERPL-CCNC: >2000 U/L (ref 15–37)
BACTERIA SPEC CULT: NORMAL
BILIRUB DIRECT SERPL-MCNC: 3.4 MG/DL (ref 0–0.2)
BILIRUB SERPL-MCNC: 5 MG/DL (ref 0.2–1)
BUN SERPL-MCNC: 85 MG/DL (ref 6–20)
BUN/CREAT SERPL: 25 (ref 12–20)
C DIFF GDH STL QL: NEGATIVE
C DIFF TOX A+B STL QL IA: NEGATIVE
C DIFF TOXIN INTERPRETATION: NORMAL
CALCIUM SERPL-MCNC: 9 MG/DL (ref 8.5–10.1)
CALCIUM SERPL-MCNC: 9.2 MG/DL (ref 8.5–10.1)
CHLORIDE SERPL-SCNC: 108 MMOL/L (ref 97–108)
CO2 SERPL-SCNC: 19 MMOL/L (ref 21–32)
CREAT SERPL-MCNC: 3.42 MG/DL (ref 0.7–1.3)
ERYTHROCYTE [DISTWIDTH] IN BLOOD BY AUTOMATED COUNT: 21.8 % (ref 11.5–14.5)
GLOBULIN SER CALC-MCNC: 4.6 G/DL (ref 2–4)
GLUCOSE SERPL-MCNC: 92 MG/DL (ref 65–100)
HAV IGM SER QL: NONREACTIVE
HBV CORE IGM SER QL: NONREACTIVE
HBV SURFACE AG SER QL: <0.1 INDEX
HBV SURFACE AG SER QL: NEGATIVE
HCT VFR BLD AUTO: 26.1 % (ref 36.6–50.3)
HCV AB SER IA-ACNC: >11 INDEX
HCV AB SERPL QL IA: REACTIVE
HGB BLD-MCNC: 8.7 G/DL (ref 12.1–17)
MCH RBC QN AUTO: 27.9 PG (ref 26–34)
MCHC RBC AUTO-ENTMCNC: 33.3 G/DL (ref 30–36.5)
MCV RBC AUTO: 83.7 FL (ref 80–99)
NRBC # BLD: 0 K/UL (ref 0–0.01)
NRBC BLD-RTO: 0 PER 100 WBC
PHOSPHATE SERPL-MCNC: 3.6 MG/DL (ref 2.6–4.7)
PLATELET # BLD AUTO: 235 K/UL (ref 150–400)
POTASSIUM SERPL-SCNC: 5 MMOL/L (ref 3.5–5.1)
PROT SERPL-MCNC: 8.1 G/DL (ref 6.4–8.2)
PTH-INTACT SERPL-MCNC: 110.9 PG/ML (ref 18.4–88)
RBC # BLD AUTO: 3.12 M/UL (ref 4.1–5.7)
SERVICE CMNT-IMP: NORMAL
SODIUM SERPL-SCNC: 136 MMOL/L (ref 136–145)
UFH PPP CHRO-ACNC: 0.55 IU/ML
WBC # BLD AUTO: 11 K/UL (ref 4.1–11.1)

## 2023-09-27 PROCEDURE — 80076 HEPATIC FUNCTION PANEL: CPT

## 2023-09-27 PROCEDURE — 83970 ASSAY OF PARATHORMONE: CPT

## 2023-09-27 PROCEDURE — 0W3P8ZZ CONTROL BLEEDING IN GASTROINTESTINAL TRACT, VIA NATURAL OR ARTIFICIAL OPENING ENDOSCOPIC: ICD-10-PCS | Performed by: INTERNAL MEDICINE

## 2023-09-27 PROCEDURE — 80069 RENAL FUNCTION PANEL: CPT

## 2023-09-27 PROCEDURE — 6360000002 HC RX W HCPCS: Performed by: INTERNAL MEDICINE

## 2023-09-27 PROCEDURE — 85520 HEPARIN ASSAY: CPT

## 2023-09-27 PROCEDURE — 36415 COLL VENOUS BLD VENIPUNCTURE: CPT

## 2023-09-27 PROCEDURE — C9113 INJ PANTOPRAZOLE SODIUM, VIA: HCPCS | Performed by: NURSE PRACTITIONER

## 2023-09-27 PROCEDURE — 76705 ECHO EXAM OF ABDOMEN: CPT

## 2023-09-27 PROCEDURE — 3600007512: Performed by: INTERNAL MEDICINE

## 2023-09-27 PROCEDURE — C1889 IMPLANT/INSERT DEVICE, NOC: HCPCS | Performed by: INTERNAL MEDICINE

## 2023-09-27 PROCEDURE — A4216 STERILE WATER/SALINE, 10 ML: HCPCS | Performed by: NURSE PRACTITIONER

## 2023-09-27 PROCEDURE — 6360000002 HC RX W HCPCS: Performed by: STUDENT IN AN ORGANIZED HEALTH CARE EDUCATION/TRAINING PROGRAM

## 2023-09-27 PROCEDURE — 6360000002 HC RX W HCPCS: Performed by: NURSE PRACTITIONER

## 2023-09-27 PROCEDURE — 87324 CLOSTRIDIUM AG IA: CPT

## 2023-09-27 PROCEDURE — 87449 NOS EACH ORGANISM AG IA: CPT

## 2023-09-27 PROCEDURE — 6360000002 HC RX W HCPCS: Performed by: NURSE ANESTHETIST, CERTIFIED REGISTERED

## 2023-09-27 PROCEDURE — 2500000003 HC RX 250 WO HCPCS: Performed by: NURSE ANESTHETIST, CERTIFIED REGISTERED

## 2023-09-27 PROCEDURE — 2580000003 HC RX 258: Performed by: INTERNAL MEDICINE

## 2023-09-27 PROCEDURE — 7100000010 HC PHASE II RECOVERY - FIRST 15 MIN: Performed by: INTERNAL MEDICINE

## 2023-09-27 PROCEDURE — 3700000001 HC ADD 15 MINUTES (ANESTHESIA): Performed by: INTERNAL MEDICINE

## 2023-09-27 PROCEDURE — 3600007502: Performed by: INTERNAL MEDICINE

## 2023-09-27 PROCEDURE — 2709999900 HC NON-CHARGEABLE SUPPLY: Performed by: INTERNAL MEDICINE

## 2023-09-27 PROCEDURE — 3700000000 HC ANESTHESIA ATTENDED CARE: Performed by: INTERNAL MEDICINE

## 2023-09-27 PROCEDURE — 85027 COMPLETE CBC AUTOMATED: CPT

## 2023-09-27 PROCEDURE — 1100000003 HC PRIVATE W/ TELEMETRY

## 2023-09-27 PROCEDURE — 2580000003 HC RX 258: Performed by: STUDENT IN AN ORGANIZED HEALTH CARE EDUCATION/TRAINING PROGRAM

## 2023-09-27 PROCEDURE — 2720000010 HC SURG SUPPLY STERILE: Performed by: INTERNAL MEDICINE

## 2023-09-27 PROCEDURE — 2580000003 HC RX 258: Performed by: NURSE PRACTITIONER

## 2023-09-27 DEVICE — CLIP
Type: IMPLANTABLE DEVICE | Site: STOMACH | Status: FUNCTIONAL
Brand: RESOLUTION 360™ ULTRA CLIP

## 2023-09-27 RX ORDER — METOCLOPRAMIDE HYDROCHLORIDE 5 MG/ML
10 INJECTION INTRAMUSCULAR; INTRAVENOUS ONCE
Status: COMPLETED | OUTPATIENT
Start: 2023-09-27 | End: 2023-09-27

## 2023-09-27 RX ORDER — PANTOPRAZOLE SODIUM 40 MG/10ML
INJECTION, POWDER, LYOPHILIZED, FOR SOLUTION INTRAVENOUS
Status: DISPENSED
Start: 2023-09-27 | End: 2023-09-28

## 2023-09-27 RX ORDER — GLYCOPYRROLATE 0.2 MG/ML
INJECTION INTRAMUSCULAR; INTRAVENOUS PRN
Status: DISCONTINUED | OUTPATIENT
Start: 2023-09-27 | End: 2023-09-27 | Stop reason: SDUPTHER

## 2023-09-27 RX ORDER — OCTREOTIDE ACETATE 50 UG/ML
50 INJECTION, SOLUTION INTRAVENOUS; SUBCUTANEOUS ONCE
Status: COMPLETED | OUTPATIENT
Start: 2023-09-27 | End: 2023-09-27

## 2023-09-27 RX ORDER — SODIUM CHLORIDE 0.9 % (FLUSH) 0.9 %
5-40 SYRINGE (ML) INJECTION EVERY 12 HOURS SCHEDULED
Status: DISCONTINUED | OUTPATIENT
Start: 2023-09-27 | End: 2023-09-27 | Stop reason: HOSPADM

## 2023-09-27 RX ORDER — SODIUM CHLORIDE 0.9 % (FLUSH) 0.9 %
5-40 SYRINGE (ML) INJECTION PRN
Status: DISCONTINUED | OUTPATIENT
Start: 2023-09-27 | End: 2023-09-27 | Stop reason: HOSPADM

## 2023-09-27 RX ORDER — LIDOCAINE HYDROCHLORIDE 20 MG/ML
INJECTION, SOLUTION EPIDURAL; INFILTRATION; INTRACAUDAL; PERINEURAL PRN
Status: DISCONTINUED | OUTPATIENT
Start: 2023-09-27 | End: 2023-09-27 | Stop reason: SDUPTHER

## 2023-09-27 RX ORDER — SODIUM CHLORIDE 9 MG/ML
25 INJECTION, SOLUTION INTRAVENOUS PRN
Status: DISCONTINUED | OUTPATIENT
Start: 2023-09-27 | End: 2023-09-27 | Stop reason: HOSPADM

## 2023-09-27 RX ORDER — CEFTRIAXONE 1 G/50ML
1000 INJECTION, SOLUTION INTRAVENOUS EVERY 24 HOURS
Status: DISCONTINUED | OUTPATIENT
Start: 2023-09-27 | End: 2023-09-27

## 2023-09-27 RX ADMIN — PROPOFOL 50 MG: 10 INJECTION, EMULSION INTRAVENOUS at 14:37

## 2023-09-27 RX ADMIN — PROPOFOL 50 MG: 10 INJECTION, EMULSION INTRAVENOUS at 14:44

## 2023-09-27 RX ADMIN — OCTREOTIDE ACETATE 50 MCG: 50 INJECTION, SOLUTION INTRAVENOUS; SUBCUTANEOUS at 11:05

## 2023-09-27 RX ADMIN — SODIUM CHLORIDE, POTASSIUM CHLORIDE, SODIUM LACTATE AND CALCIUM CHLORIDE: 600; 310; 30; 20 INJECTION, SOLUTION INTRAVENOUS at 01:25

## 2023-09-27 RX ADMIN — LIDOCAINE HYDROCHLORIDE 100 MG: 20 INJECTION, SOLUTION EPIDURAL; INFILTRATION; INTRACAUDAL; PERINEURAL at 14:37

## 2023-09-27 RX ADMIN — GLYCOPYRROLATE 0.2 MG: 0.2 INJECTION, SOLUTION INTRAMUSCULAR; INTRAVENOUS at 14:31

## 2023-09-27 RX ADMIN — PROPOFOL 50 MG: 10 INJECTION, EMULSION INTRAVENOUS at 14:41

## 2023-09-27 RX ADMIN — ONDANSETRON 4 MG: 2 INJECTION INTRAMUSCULAR; INTRAVENOUS at 11:11

## 2023-09-27 RX ADMIN — SODIUM CHLORIDE, PRESERVATIVE FREE 40 MG: 5 INJECTION INTRAVENOUS at 11:05

## 2023-09-27 RX ADMIN — OCTREOTIDE ACETATE 50 MCG/HR: 500 INJECTION, SOLUTION INTRAVENOUS; SUBCUTANEOUS at 11:09

## 2023-09-27 RX ADMIN — PROPOFOL 50 MG: 10 INJECTION, EMULSION INTRAVENOUS at 14:49

## 2023-09-27 RX ADMIN — SODIUM CHLORIDE, POTASSIUM CHLORIDE, SODIUM LACTATE AND CALCIUM CHLORIDE: 600; 310; 30; 20 INJECTION, SOLUTION INTRAVENOUS at 08:01

## 2023-09-27 RX ADMIN — PROPOFOL 20 MG: 10 INJECTION, EMULSION INTRAVENOUS at 14:56

## 2023-09-27 RX ADMIN — PROPOFOL 50 MG: 10 INJECTION, EMULSION INTRAVENOUS at 14:47

## 2023-09-27 RX ADMIN — SODIUM CHLORIDE, PRESERVATIVE FREE 40 MG: 5 INJECTION INTRAVENOUS at 21:44

## 2023-09-27 RX ADMIN — SODIUM CHLORIDE 1000 MG: 900 INJECTION INTRAVENOUS at 21:44

## 2023-09-27 RX ADMIN — SODIUM CHLORIDE, PRESERVATIVE FREE 10 ML: 5 INJECTION INTRAVENOUS at 09:56

## 2023-09-27 RX ADMIN — SODIUM CHLORIDE 25 ML: 9 INJECTION, SOLUTION INTRAVENOUS at 13:23

## 2023-09-27 RX ADMIN — SODIUM CHLORIDE, PRESERVATIVE FREE 10 ML: 5 INJECTION INTRAVENOUS at 21:45

## 2023-09-27 RX ADMIN — PROPOFOL 50 MG: 10 INJECTION, EMULSION INTRAVENOUS at 14:38

## 2023-09-27 RX ADMIN — PROPOFOL 50 MG: 10 INJECTION, EMULSION INTRAVENOUS at 14:51

## 2023-09-27 RX ADMIN — PROPOFOL 50 MG: 10 INJECTION, EMULSION INTRAVENOUS at 14:40

## 2023-09-27 RX ADMIN — METOCLOPRAMIDE 10 MG: 5 INJECTION, SOLUTION INTRAMUSCULAR; INTRAVENOUS at 11:30

## 2023-09-27 ASSESSMENT — PAIN SCALES - GENERAL
PAINLEVEL_OUTOF10: 0
PAINLEVEL_OUTOF10: 9
PAINLEVEL_OUTOF10: 5
PAINLEVEL_OUTOF10: 0

## 2023-09-27 ASSESSMENT — PAIN DESCRIPTION - DESCRIPTORS: DESCRIPTORS: ACHING

## 2023-09-27 ASSESSMENT — PAIN - FUNCTIONAL ASSESSMENT
PAIN_FUNCTIONAL_ASSESSMENT: 0-10
PAIN_FUNCTIONAL_ASSESSMENT: PREVENTS OR INTERFERES SOME ACTIVE ACTIVITIES AND ADLS

## 2023-09-27 ASSESSMENT — PAIN DESCRIPTION - ORIENTATION: ORIENTATION: RIGHT

## 2023-09-27 ASSESSMENT — PAIN DESCRIPTION - LOCATION: LOCATION: FLANK

## 2023-09-27 ASSESSMENT — PAIN DESCRIPTION - PAIN TYPE: TYPE: OTHER (COMMENT)

## 2023-09-27 ASSESSMENT — PAIN DESCRIPTION - FREQUENCY: FREQUENCY: CONTINUOUS

## 2023-09-27 ASSESSMENT — PAIN DESCRIPTION - ONSET: ONSET: ON-GOING

## 2023-09-27 NOTE — ADT AUTH CERT
flush 0.9 % injection 5-40 mL  5-40 mL IntraVENous 2 times per day    sodium chloride flush 0.9 % injection 5-40 mL  5-40 mL IntraVENous PRN    0.9 % sodium chloride infusion  25 mL IntraVENous PRN    cefTRIAXone (ROCEPHIN) 1000 mg in 50 mL IVPB (premix)  1,000 mg IntraVENous Q24H    [Held by provider] furosemide (LASIX) tablet 40 mg  40 mg Oral Daily    [Held by provider] losartan (COZAAR) tablet 100 mg  100 mg Oral Daily    sodium chloride flush 0.9 % injection 5-40 mL  5-40 mL IntraVENous 2 times per day    sodium chloride flush 0.9 % injection 5-40 mL  5-40 mL IntraVENous PRN    0.9 % sodium chloride infusion   IntraVENous PRN    ondansetron (ZOFRAN-ODT) disintegrating tablet 4 mg  4 mg Oral Q8H PRN     Or    ondansetron (ZOFRAN) injection 4 mg  4 mg IntraVENous Q6H PRN    polyethylene glycol (GLYCOLAX) packet 17 g  17 g Oral Daily PRN    acetaminophen (TYLENOL) tablet 650 mg  650 mg Oral Q6H PRN     Or    acetaminophen (TYLENOL) suppository 650 mg  650 mg Rectal Q6H PRN    hydrALAZINE (APRESOLINE) injection 10 mg  10 mg IntraVENous Q6H PRN    labetalol (NORMODYNE;TRANDATE) injection 10 mg  10 mg IntraVENous Q6H PRN    oxyCODONE (ROXICODONE) immediate release tablet 5 mg  5 mg Oral Q4H PRN    oxyCODONE (ROXICODONE) immediate release tablet 2.5 mg  2.5 mg Oral Q4H PRN    lactated ringers IV soln infusion   IntraVENous Continuous         Louann Villalobos MD  9/27/2023

## 2023-09-27 NOTE — OP NOTE
NAME:  Shelton Marin :   1950   MRN:   [id number]     Date/Time:  2023 3:06 PM    Esophagogastroduodenoscopy (EGD) Procedure Note    Procedure: Esophagogastroduodenoscopy with control of bleeding    Indication: Hematemesis  Pre-operative Diagnosis: see indication above  Post-operative Diagnosis: see findings below  :  Awilda Marina MD  Referring Provider:   --Reuben Ash MD    Exam:  Airway: clear, no airway problems anticipated  Heart: RRR, without gallops or rubs  Lungs: clear bilaterally without wheezes, crackles, or rhonchi  Abdomen: soft, nontender, nondistended, bowel sounds present  Mental Status: awake, alert and oriented to person, place and time     Anethesia/Sedation:  MAC anesthesia Propofol  Procedure Details   After informed consent was obtained for the procedure, with all risks and benefits of procedure explained the patient was taken to the endoscopy suite and placed in the left lateral decubitus position. Following sequential administration of sedation as per above, the [id number] gastroscope was inserted into the mouth and advanced under direct vision to third portion of the duodenum. A careful inspection was made as the gastroscope was withdrawn, including a retroflexed view of the proximal stomach; findings and interventions are described below. Findings:   Normal proximal, mid, and distal esophagus. Notably no esophageal varices  Large, cratered ulcer along posterior-lateral wall of antrum was seen with very large adherent clot. The clot could only be partially removed and revealed underlying active oozing, Andrew classification 1b. Compete hemostasis was achieved with 'Ultra' hemostatic clip placement x 3 and bipolar probe/cautery. Heme was seen in gastric body  Stomach otherwise normal, including retroflexion.  Notably no gastric varices  Downstream blood in duodenal bulb and 2nd/3rd portion of the duodenum from gastric ulcer     Therapies:

## 2023-09-27 NOTE — ANESTHESIA POSTPROCEDURE EVALUATION
Department of Anesthesiology  Postprocedure Note    Patient: Regine Patton MRN: [id number]  YOB: 1950  Date of evaluation: 9/27/2023      Procedure Summary     Date: 09/27/23 Room / Location: [facility] / [facility] END[facility]COPY    Anesthesia Start: 9129 Anesthesia Stop: 9045    Procedure: EGD ESOPHAGOGASTRODUODENOSCOPY Diagnosis:       Melena      (Melena [K92.1])    Surgeons: Yoandy Box MD Responsible Provider: Lucia Acosta MD    Anesthesia Type: MAC ASA Status: 2          Anesthesia Type: No value filed.     Robert Phase I: Robert Score: 10    Robert Phase II: Robert Score: 10      Anesthesia Post Evaluation    Patient location during evaluation: PACU  Patient participation: complete - patient participated  Level of consciousness: sleepy but conscious and responsive to verbal stimuli  Pain score: 2  Airway patency: patent  Nausea & Vomiting: no vomiting and no nausea  Complications: no  Cardiovascular status: blood pressure returned to baseline and hemodynamically stable  Respiratory status: acceptable  Hydration status: stable  Multimodal analgesia pain management approach  Pain management: adequate

## 2023-09-27 NOTE — PERIOP NOTE
See anesthesia note and MAR for medications given during procedure. Received report from anesthesia staff on vital signs and status of patient.      Endoscope was pre-cleaned at the bedside immediately following procedure by Patricia Mcneil RN

## 2023-09-27 NOTE — PLAN OF CARE
Problem: Safety - Adult  Goal: Free from fall injury  9/27/2023 1223 by Kori Barkley RN  Outcome: Progressing

## 2023-09-27 NOTE — PLAN OF CARE
Problem: Discharge Planning  Goal: Discharge to home or other facility with appropriate resources  Outcome: Progressing  Flowsheets (Taken 9/26/2023 2000)  Discharge to home or other facility with appropriate resources:   Identify barriers to discharge with patient and caregiver   Arrange for needed discharge resources and transportation as appropriate   Identify discharge learning needs (meds, wound care, etc)     Problem: Safety - Adult  Goal: Free from fall injury  Outcome: Progressing  Flowsheets (Taken 9/26/2023 2000)  Free From Fall Injury: Instruct family/caregiver on patient safety     Problem: ABCDS Injury Assessment  Goal: Absence of physical injury  Outcome: Progressing  Flowsheets (Taken 9/26/2023 2000)  Absence of Physical Injury: Implement safety measures based on patient assessment

## 2023-09-28 LAB
ALBUMIN SERPL-MCNC: 3.2 G/DL (ref 3.5–5)
ALBUMIN/GLOB SERPL: 0.8 (ref 1.1–2.2)
ALP SERPL-CCNC: 262 U/L (ref 45–117)
ALT SERPL-CCNC: 726 U/L (ref 12–78)
ANION GAP SERPL CALC-SCNC: 11 MMOL/L (ref 5–15)
AST SERPL-CCNC: >2000 U/L (ref 15–37)
BILIRUB DIRECT SERPL-MCNC: 3.2 MG/DL (ref 0–0.2)
BILIRUB SERPL-MCNC: 4.3 MG/DL (ref 0.2–1)
BUN SERPL-MCNC: 82 MG/DL (ref 6–20)
BUN/CREAT SERPL: 26 (ref 12–20)
CALCIUM SERPL-MCNC: 8.8 MG/DL (ref 8.5–10.1)
CHLORIDE SERPL-SCNC: 111 MMOL/L (ref 97–108)
CO2 SERPL-SCNC: 18 MMOL/L (ref 21–32)
CREAT SERPL-MCNC: 3.2 MG/DL (ref 0.7–1.3)
ERYTHROCYTE [DISTWIDTH] IN BLOOD BY AUTOMATED COUNT: 20.7 % (ref 11.5–14.5)
FERRITIN SERPL-MCNC: ABNORMAL NG/ML (ref 26–388)
GLOBULIN SER CALC-MCNC: 4 G/DL (ref 2–4)
GLUCOSE SERPL-MCNC: 142 MG/DL (ref 65–100)
HCT VFR BLD AUTO: 21.6 % (ref 36.6–50.3)
HGB BLD-MCNC: 7.3 G/DL (ref 12.1–17)
INR PPP: 1.5 (ref 0.9–1.1)
IRON SATN MFR SERPL: 21 % (ref 20–50)
IRON SERPL-MCNC: 41 UG/DL (ref 35–150)
MCH RBC QN AUTO: 28.6 PG (ref 26–34)
MCHC RBC AUTO-ENTMCNC: 33.8 G/DL (ref 30–36.5)
MCV RBC AUTO: 84.7 FL (ref 80–99)
NRBC # BLD: 0 K/UL (ref 0–0.01)
NRBC BLD-RTO: 0 PER 100 WBC
PHOSPHATE SERPL-MCNC: 3.8 MG/DL (ref 2.6–4.7)
PLATELET # BLD AUTO: 193 K/UL (ref 150–400)
PMV BLD AUTO: 12 FL (ref 8.9–12.9)
POTASSIUM SERPL-SCNC: 4.8 MMOL/L (ref 3.5–5.1)
PROT SERPL-MCNC: 7.2 G/DL (ref 6.4–8.2)
PROTHROMBIN TIME: 15.2 SEC (ref 9–11.1)
RBC # BLD AUTO: 2.55 M/UL (ref 4.1–5.7)
SODIUM SERPL-SCNC: 140 MMOL/L (ref 136–145)
TIBC SERPL-MCNC: 193 UG/DL (ref 250–450)
TIBC SERPL-MCNC: 196 UG/DL (ref 250–450)
WBC # BLD AUTO: 10.3 K/UL (ref 4.1–11.1)

## 2023-09-28 PROCEDURE — 83540 ASSAY OF IRON: CPT

## 2023-09-28 PROCEDURE — 85027 COMPLETE CBC AUTOMATED: CPT

## 2023-09-28 PROCEDURE — 82728 ASSAY OF FERRITIN: CPT

## 2023-09-28 PROCEDURE — 2580000003 HC RX 258: Performed by: INTERNAL MEDICINE

## 2023-09-28 PROCEDURE — C9113 INJ PANTOPRAZOLE SODIUM, VIA: HCPCS | Performed by: NURSE PRACTITIONER

## 2023-09-28 PROCEDURE — 86677 HELICOBACTER PYLORI ANTIBODY: CPT

## 2023-09-28 PROCEDURE — 36415 COLL VENOUS BLD VENIPUNCTURE: CPT

## 2023-09-28 PROCEDURE — A4216 STERILE WATER/SALINE, 10 ML: HCPCS | Performed by: NURSE PRACTITIONER

## 2023-09-28 PROCEDURE — 6360000002 HC RX W HCPCS: Performed by: STUDENT IN AN ORGANIZED HEALTH CARE EDUCATION/TRAINING PROGRAM

## 2023-09-28 PROCEDURE — 80076 HEPATIC FUNCTION PANEL: CPT

## 2023-09-28 PROCEDURE — 85610 PROTHROMBIN TIME: CPT

## 2023-09-28 PROCEDURE — 84100 ASSAY OF PHOSPHORUS: CPT

## 2023-09-28 PROCEDURE — 80048 BASIC METABOLIC PNL TOTAL CA: CPT

## 2023-09-28 PROCEDURE — 2580000003 HC RX 258: Performed by: NURSE PRACTITIONER

## 2023-09-28 PROCEDURE — 83550 IRON BINDING TEST: CPT

## 2023-09-28 PROCEDURE — 2580000003 HC RX 258: Performed by: STUDENT IN AN ORGANIZED HEALTH CARE EDUCATION/TRAINING PROGRAM

## 2023-09-28 PROCEDURE — 1100000003 HC PRIVATE W/ TELEMETRY

## 2023-09-28 PROCEDURE — 6360000002 HC RX W HCPCS: Performed by: NURSE PRACTITIONER

## 2023-09-28 RX ADMIN — SODIUM CHLORIDE, POTASSIUM CHLORIDE, SODIUM LACTATE AND CALCIUM CHLORIDE: 600; 310; 30; 20 INJECTION, SOLUTION INTRAVENOUS at 04:00

## 2023-09-28 RX ADMIN — SODIUM CHLORIDE, PRESERVATIVE FREE 10 ML: 5 INJECTION INTRAVENOUS at 22:52

## 2023-09-28 RX ADMIN — SODIUM CHLORIDE 1000 MG: 900 INJECTION INTRAVENOUS at 20:07

## 2023-09-28 RX ADMIN — SODIUM CHLORIDE, PRESERVATIVE FREE 10 ML: 5 INJECTION INTRAVENOUS at 10:20

## 2023-09-28 RX ADMIN — SODIUM CHLORIDE, POTASSIUM CHLORIDE, SODIUM LACTATE AND CALCIUM CHLORIDE: 600; 310; 30; 20 INJECTION, SOLUTION INTRAVENOUS at 16:33

## 2023-09-28 RX ADMIN — SODIUM CHLORIDE, PRESERVATIVE FREE 40 MG: 5 INJECTION INTRAVENOUS at 22:53

## 2023-09-28 RX ADMIN — SODIUM CHLORIDE, PRESERVATIVE FREE 40 MG: 5 INJECTION INTRAVENOUS at 10:17

## 2023-09-28 ASSESSMENT — PAIN SCALES - GENERAL
PAINLEVEL_OUTOF10: 0
PAINLEVEL_OUTOF10: 7

## 2023-09-28 ASSESSMENT — PAIN DESCRIPTION - ONSET: ONSET: ON-GOING

## 2023-09-28 ASSESSMENT — PAIN DESCRIPTION - DESCRIPTORS: DESCRIPTORS: ACHING

## 2023-09-28 ASSESSMENT — PAIN DESCRIPTION - LOCATION: LOCATION: FLANK

## 2023-09-28 ASSESSMENT — PAIN DESCRIPTION - ORIENTATION: ORIENTATION: RIGHT

## 2023-09-28 ASSESSMENT — PAIN DESCRIPTION - FREQUENCY: FREQUENCY: CONTINUOUS

## 2023-09-28 ASSESSMENT — PAIN DESCRIPTION - PAIN TYPE: TYPE: OTHER (COMMENT)

## 2023-09-28 ASSESSMENT — PAIN - FUNCTIONAL ASSESSMENT: PAIN_FUNCTIONAL_ASSESSMENT: PREVENTS OR INTERFERES SOME ACTIVE ACTIVITIES AND ADLS

## 2023-09-28 NOTE — PLAN OF CARE
Problem: Discharge Planning  Goal: Discharge to home or other facility with appropriate resources  Outcome: Progressing  Flowsheets (Taken 9/27/2023 1908)  Discharge to home or other facility with appropriate resources:   Identify barriers to discharge with patient and caregiver   Arrange for needed discharge resources and transportation as appropriate   Identify discharge learning needs (meds, wound care, etc)     Problem: Safety - Adult  Goal: Free from fall injury  Outcome: Progressing  Flowsheets (Taken 9/27/2023 1908)  Free From Fall Injury: Instruct family/caregiver on patient safety     Problem: ABCDS Injury Assessment  Goal: Absence of physical injury  Outcome: Progressing  Flowsheets (Taken 9/27/2023 1908)  Absence of Physical Injury: Implement safety measures based on patient assessment     Problem: Pain  Goal: Verbalizes/displays adequate comfort level or baseline comfort level  Outcome: Progressing  Flowsheets  Taken 9/28/2023 0237  Verbalizes/displays adequate comfort level or baseline comfort level:   Encourage patient to monitor pain and request assistance   Assess pain using appropriate pain scale   Administer analgesics based on type and severity of pain and evaluate response   Implement non-pharmacological measures as appropriate and evaluate response  Taken 9/27/2023 2314  Verbalizes/displays adequate comfort level or baseline comfort level:   Encourage patient to monitor pain and request assistance   Assess pain using appropriate pain scale   Administer analgesics based on type and severity of pain and evaluate response   Implement non-pharmacological measures as appropriate and evaluate response   Consider cultural and social influences on pain and pain management

## 2023-09-29 LAB
ALBUMIN SERPL-MCNC: 3.1 G/DL (ref 3.5–5)
ALBUMIN/GLOB SERPL: 0.8 (ref 1.1–2.2)
ALP SERPL-CCNC: 271 U/L (ref 45–117)
ALT SERPL-CCNC: 544 U/L (ref 12–78)
ANION GAP SERPL CALC-SCNC: 10 MMOL/L (ref 5–15)
AST SERPL-CCNC: 1523 U/L (ref 15–37)
BILIRUB DIRECT SERPL-MCNC: 3.4 MG/DL (ref 0–0.2)
BILIRUB SERPL-MCNC: 4.3 MG/DL (ref 0.2–1)
BUN SERPL-MCNC: 75 MG/DL (ref 6–20)
BUN/CREAT SERPL: 26 (ref 12–20)
CALCIUM SERPL-MCNC: 9.1 MG/DL (ref 8.5–10.1)
CHLORIDE SERPL-SCNC: 112 MMOL/L (ref 97–108)
CO2 SERPL-SCNC: 21 MMOL/L (ref 21–32)
CREAT SERPL-MCNC: 2.93 MG/DL (ref 0.7–1.3)
ERYTHROCYTE [DISTWIDTH] IN BLOOD BY AUTOMATED COUNT: 21.8 % (ref 11.5–14.5)
GLOBULIN SER CALC-MCNC: 4 G/DL (ref 2–4)
GLUCOSE SERPL-MCNC: 114 MG/DL (ref 65–100)
HCT VFR BLD AUTO: 22 % (ref 36.6–50.3)
HGB BLD-MCNC: 7.3 G/DL (ref 12.1–17)
IGG SERPL-MCNC: 1220 MG/DL (ref 700–1600)
INR PPP: 1.5 (ref 0.9–1.1)
MCH RBC QN AUTO: 28.1 PG (ref 26–34)
MCHC RBC AUTO-ENTMCNC: 33.2 G/DL (ref 30–36.5)
MCV RBC AUTO: 84.6 FL (ref 80–99)
NRBC # BLD: 0.02 K/UL (ref 0–0.01)
NRBC BLD-RTO: 0.3 PER 100 WBC
PHOSPHATE SERPL-MCNC: 3.4 MG/DL (ref 2.6–4.7)
PLATELET # BLD AUTO: 198 K/UL (ref 150–400)
PMV BLD AUTO: 12.1 FL (ref 8.9–12.9)
POTASSIUM SERPL-SCNC: 4.6 MMOL/L (ref 3.5–5.1)
PROT SERPL-MCNC: 7.1 G/DL (ref 6.4–8.2)
PROTHROMBIN TIME: 15.1 SEC (ref 9–11.1)
RBC # BLD AUTO: 2.6 M/UL (ref 4.1–5.7)
SODIUM SERPL-SCNC: 143 MMOL/L (ref 136–145)
WBC # BLD AUTO: 6.9 K/UL (ref 4.1–11.1)

## 2023-09-29 PROCEDURE — 6360000002 HC RX W HCPCS: Performed by: INTERNAL MEDICINE

## 2023-09-29 PROCEDURE — 1100000003 HC PRIVATE W/ TELEMETRY

## 2023-09-29 PROCEDURE — 6360000002 HC RX W HCPCS: Performed by: STUDENT IN AN ORGANIZED HEALTH CARE EDUCATION/TRAINING PROGRAM

## 2023-09-29 PROCEDURE — 6360000002 HC RX W HCPCS: Performed by: NURSE PRACTITIONER

## 2023-09-29 PROCEDURE — 85610 PROTHROMBIN TIME: CPT

## 2023-09-29 PROCEDURE — A4216 STERILE WATER/SALINE, 10 ML: HCPCS | Performed by: NURSE PRACTITIONER

## 2023-09-29 PROCEDURE — 36415 COLL VENOUS BLD VENIPUNCTURE: CPT

## 2023-09-29 PROCEDURE — 2580000003 HC RX 258: Performed by: STUDENT IN AN ORGANIZED HEALTH CARE EDUCATION/TRAINING PROGRAM

## 2023-09-29 PROCEDURE — 85027 COMPLETE CBC AUTOMATED: CPT

## 2023-09-29 PROCEDURE — 81332 SERPINA1 GENE: CPT

## 2023-09-29 PROCEDURE — 80076 HEPATIC FUNCTION PANEL: CPT

## 2023-09-29 PROCEDURE — 82390 ASSAY OF CERULOPLASMIN: CPT

## 2023-09-29 PROCEDURE — 2580000003 HC RX 258: Performed by: NURSE PRACTITIONER

## 2023-09-29 PROCEDURE — 86381 MITOCHONDRIAL ANTIBODY EACH: CPT

## 2023-09-29 PROCEDURE — 82784 ASSAY IGA/IGD/IGG/IGM EACH: CPT

## 2023-09-29 PROCEDURE — 2580000003 HC RX 258: Performed by: INTERNAL MEDICINE

## 2023-09-29 PROCEDURE — 86015 ACTIN ANTIBODY EACH: CPT

## 2023-09-29 PROCEDURE — 86038 ANTINUCLEAR ANTIBODIES: CPT

## 2023-09-29 PROCEDURE — C9113 INJ PANTOPRAZOLE SODIUM, VIA: HCPCS | Performed by: NURSE PRACTITIONER

## 2023-09-29 PROCEDURE — 84100 ASSAY OF PHOSPHORUS: CPT

## 2023-09-29 PROCEDURE — 80048 BASIC METABOLIC PNL TOTAL CA: CPT

## 2023-09-29 RX ADMIN — SODIUM CHLORIDE, PRESERVATIVE FREE 40 MG: 5 INJECTION INTRAVENOUS at 10:22

## 2023-09-29 RX ADMIN — SODIUM CHLORIDE 1000 MG: 900 INJECTION INTRAVENOUS at 20:37

## 2023-09-29 RX ADMIN — SODIUM CHLORIDE, PRESERVATIVE FREE 40 MG: 5 INJECTION INTRAVENOUS at 20:37

## 2023-09-29 RX ADMIN — SODIUM CHLORIDE, POTASSIUM CHLORIDE, SODIUM LACTATE AND CALCIUM CHLORIDE: 600; 310; 30; 20 INJECTION, SOLUTION INTRAVENOUS at 04:28

## 2023-09-29 RX ADMIN — SODIUM CHLORIDE, PRESERVATIVE FREE 10 ML: 5 INJECTION INTRAVENOUS at 20:37

## 2023-09-29 RX ADMIN — EPOETIN ALFA 10000 UNITS: 10000 SOLUTION INTRAVENOUS; SUBCUTANEOUS at 20:34

## 2023-09-29 ASSESSMENT — PAIN SCALES - GENERAL: PAINLEVEL_OUTOF10: 0

## 2023-09-29 NOTE — CARE COORDINATION
Transition of Care Plan:    RUR: 16%  Prior Level of Functioning:   Disposition: Home w/family  If SNF or IPR: Date FOC offered:   Date FOC received:   Accepting facility:   Date authorization started with reference number:   Date authorization received and expires: Follow up appointments:   DME needed: n/a  Transportation at discharge: family  IM/IMM Medicare/ letter given: 2nd IM needed  Is patient a [place] and connected with [facility]? If yes, was [employer] transfer form completed and [facility] notified? Caregiver Contact:   Discharge Caregiver contacted prior to discharge? Care Conference needed? Barriers to discharge:     Patient not medically cleared for d/c. Nephrology following.  CM will continue to follow    Bren Roberson  Ext [phone]

## 2023-09-30 LAB
ALBUMIN SERPL-MCNC: 2.9 G/DL (ref 3.5–5)
ALBUMIN SERPL-MCNC: 3 G/DL (ref 3.5–5)
ALBUMIN/GLOB SERPL: 0.7 (ref 1.1–2.2)
ALBUMIN/GLOB SERPL: 0.8 (ref 1.1–2.2)
ALP SERPL-CCNC: 286 U/L (ref 45–117)
ALP SERPL-CCNC: 288 U/L (ref 45–117)
ALT SERPL-CCNC: 414 U/L (ref 12–78)
ALT SERPL-CCNC: 414 U/L (ref 12–78)
ANA SER QL: NEGATIVE
ANION GAP SERPL CALC-SCNC: 8 MMOL/L (ref 5–15)
AST SERPL-CCNC: 987 U/L (ref 15–37)
AST SERPL-CCNC: 998 U/L (ref 15–37)
BILIRUB DIRECT SERPL-MCNC: 3.4 MG/DL (ref 0–0.2)
BILIRUB SERPL-MCNC: 4.4 MG/DL (ref 0.2–1)
BILIRUB SERPL-MCNC: 4.5 MG/DL (ref 0.2–1)
BUN SERPL-MCNC: 72 MG/DL (ref 6–20)
BUN/CREAT SERPL: 24 (ref 12–20)
CALCIUM SERPL-MCNC: 9.1 MG/DL (ref 8.5–10.1)
CHLORIDE SERPL-SCNC: 111 MMOL/L (ref 97–108)
CO2 SERPL-SCNC: 21 MMOL/L (ref 21–32)
CREAT SERPL-MCNC: 3.04 MG/DL (ref 0.7–1.3)
ERYTHROCYTE [DISTWIDTH] IN BLOOD BY AUTOMATED COUNT: 22.8 % (ref 11.5–14.5)
GLOBULIN SER CALC-MCNC: 3.9 G/DL (ref 2–4)
GLOBULIN SER CALC-MCNC: 4 G/DL (ref 2–4)
GLUCOSE SERPL-MCNC: 110 MG/DL (ref 65–100)
H PYLORI IGA SER-ACNC: <9 UNITS (ref 0–8.9)
H PYLORI IGG SER IA-ACNC: 0.3 INDEX VALUE (ref 0–0.79)
H PYLORI IGM SER-ACNC: <9 UNITS (ref 0–8.9)
HCT VFR BLD AUTO: 21.2 % (ref 36.6–50.3)
HGB BLD-MCNC: 7.1 G/DL (ref 12.1–17)
INR PPP: 1.4 (ref 0.9–1.1)
MCH RBC QN AUTO: 28.5 PG (ref 26–34)
MCHC RBC AUTO-ENTMCNC: 33.5 G/DL (ref 30–36.5)
MCV RBC AUTO: 85.1 FL (ref 80–99)
NRBC # BLD: 0 K/UL (ref 0–0.01)
NRBC BLD-RTO: 0 PER 100 WBC
PLATELET # BLD AUTO: 209 K/UL (ref 150–400)
PMV BLD AUTO: 12 FL (ref 8.9–12.9)
POTASSIUM SERPL-SCNC: 4.3 MMOL/L (ref 3.5–5.1)
PROT SERPL-MCNC: 6.9 G/DL (ref 6.4–8.2)
PROT SERPL-MCNC: 6.9 G/DL (ref 6.4–8.2)
PROTHROMBIN TIME: 14.8 SEC (ref 9–11.1)
RBC # BLD AUTO: 2.49 M/UL (ref 4.1–5.7)
SODIUM SERPL-SCNC: 140 MMOL/L (ref 136–145)
WBC # BLD AUTO: 6.5 K/UL (ref 4.1–11.1)

## 2023-09-30 PROCEDURE — 6360000002 HC RX W HCPCS: Performed by: NURSE PRACTITIONER

## 2023-09-30 PROCEDURE — 1100000003 HC PRIVATE W/ TELEMETRY

## 2023-09-30 PROCEDURE — 6360000002 HC RX W HCPCS: Performed by: STUDENT IN AN ORGANIZED HEALTH CARE EDUCATION/TRAINING PROGRAM

## 2023-09-30 PROCEDURE — C9113 INJ PANTOPRAZOLE SODIUM, VIA: HCPCS | Performed by: NURSE PRACTITIONER

## 2023-09-30 PROCEDURE — 80076 HEPATIC FUNCTION PANEL: CPT

## 2023-09-30 PROCEDURE — 36415 COLL VENOUS BLD VENIPUNCTURE: CPT

## 2023-09-30 PROCEDURE — 2580000003 HC RX 258: Performed by: NURSE PRACTITIONER

## 2023-09-30 PROCEDURE — 85027 COMPLETE CBC AUTOMATED: CPT

## 2023-09-30 PROCEDURE — 2580000003 HC RX 258: Performed by: STUDENT IN AN ORGANIZED HEALTH CARE EDUCATION/TRAINING PROGRAM

## 2023-09-30 PROCEDURE — 85610 PROTHROMBIN TIME: CPT

## 2023-09-30 PROCEDURE — 2580000003 HC RX 258: Performed by: INTERNAL MEDICINE

## 2023-09-30 PROCEDURE — A4216 STERILE WATER/SALINE, 10 ML: HCPCS | Performed by: NURSE PRACTITIONER

## 2023-09-30 PROCEDURE — 80053 COMPREHEN METABOLIC PANEL: CPT

## 2023-09-30 RX ORDER — SODIUM CHLORIDE 9 MG/ML
INJECTION, SOLUTION INTRAVENOUS CONTINUOUS
Status: ACTIVE | OUTPATIENT
Start: 2023-09-30 | End: 2023-09-30

## 2023-09-30 RX ADMIN — SODIUM CHLORIDE, PRESERVATIVE FREE 40 MG: 5 INJECTION INTRAVENOUS at 21:02

## 2023-09-30 RX ADMIN — SODIUM CHLORIDE: 9 INJECTION, SOLUTION INTRAVENOUS at 08:47

## 2023-09-30 RX ADMIN — SODIUM CHLORIDE, PRESERVATIVE FREE 40 MG: 5 INJECTION INTRAVENOUS at 08:54

## 2023-09-30 RX ADMIN — SODIUM CHLORIDE, PRESERVATIVE FREE 10 ML: 5 INJECTION INTRAVENOUS at 22:03

## 2023-09-30 RX ADMIN — SODIUM CHLORIDE, PRESERVATIVE FREE 10 ML: 5 INJECTION INTRAVENOUS at 08:49

## 2023-09-30 RX ADMIN — SODIUM CHLORIDE 1000 MG: 900 INJECTION INTRAVENOUS at 21:02

## 2023-09-30 ASSESSMENT — PAIN SCALES - GENERAL: PAINLEVEL_OUTOF10: 0

## 2023-10-01 LAB
ALBUMIN SERPL-MCNC: 3.1 G/DL (ref 3.5–5)
ALBUMIN/GLOB SERPL: 0.7 (ref 1.1–2.2)
ALP SERPL-CCNC: 324 U/L (ref 45–117)
ALT SERPL-CCNC: 365 U/L (ref 12–78)
ANION GAP SERPL CALC-SCNC: 7 MMOL/L (ref 5–15)
AST SERPL-CCNC: 776 U/L (ref 15–37)
BILIRUB SERPL-MCNC: 5 MG/DL (ref 0.2–1)
BUN SERPL-MCNC: 64 MG/DL (ref 6–20)
BUN/CREAT SERPL: 22 (ref 12–20)
CALCIUM SERPL-MCNC: 8.9 MG/DL (ref 8.5–10.1)
CERULOPLASMIN SERPL-MCNC: 24.2 MG/DL (ref 16–31)
CHLORIDE SERPL-SCNC: 111 MMOL/L (ref 97–108)
CO2 SERPL-SCNC: 20 MMOL/L (ref 21–32)
CREAT SERPL-MCNC: 2.94 MG/DL (ref 0.7–1.3)
ERYTHROCYTE [DISTWIDTH] IN BLOOD BY AUTOMATED COUNT: 24.1 % (ref 11.5–14.5)
GLOBULIN SER CALC-MCNC: 4.5 G/DL (ref 2–4)
GLUCOSE SERPL-MCNC: 96 MG/DL (ref 65–100)
HCT VFR BLD AUTO: 23.8 % (ref 36.6–50.3)
HCV IGG SERPL QL IA: REACTIVE S/CO RATIO
HGB BLD-MCNC: 7.8 G/DL (ref 12.1–17)
MAGNESIUM SERPL-MCNC: 2.2 MG/DL (ref 1.6–2.4)
MCH RBC QN AUTO: 27.7 PG (ref 26–34)
MCHC RBC AUTO-ENTMCNC: 32.8 G/DL (ref 30–36.5)
MCV RBC AUTO: 84.4 FL (ref 80–99)
MITOCHONDRIA M2 IGG SER-ACNC: <20 UNITS (ref 0–20)
NRBC # BLD: 0 K/UL (ref 0–0.01)
NRBC BLD-RTO: 0 PER 100 WBC
PHOSPHATE SERPL-MCNC: 2.3 MG/DL (ref 2.6–4.7)
PLATELET # BLD AUTO: 269 K/UL (ref 150–400)
PMV BLD AUTO: 11.9 FL (ref 8.9–12.9)
POTASSIUM SERPL-SCNC: 4.5 MMOL/L (ref 3.5–5.1)
PROT SERPL-MCNC: 7.6 G/DL (ref 6.4–8.2)
RBC # BLD AUTO: 2.82 M/UL (ref 4.1–5.7)
SMA IGG SER-ACNC: 113 UNITS (ref 0–19)
SODIUM SERPL-SCNC: 138 MMOL/L (ref 136–145)
WBC # BLD AUTO: 8.4 K/UL (ref 4.1–11.1)

## 2023-10-01 PROCEDURE — 6360000002 HC RX W HCPCS: Performed by: NURSE PRACTITIONER

## 2023-10-01 PROCEDURE — 97116 GAIT TRAINING THERAPY: CPT

## 2023-10-01 PROCEDURE — C9113 INJ PANTOPRAZOLE SODIUM, VIA: HCPCS | Performed by: NURSE PRACTITIONER

## 2023-10-01 PROCEDURE — 85027 COMPLETE CBC AUTOMATED: CPT

## 2023-10-01 PROCEDURE — 97530 THERAPEUTIC ACTIVITIES: CPT

## 2023-10-01 PROCEDURE — 80053 COMPREHEN METABOLIC PANEL: CPT

## 2023-10-01 PROCEDURE — 1100000003 HC PRIVATE W/ TELEMETRY

## 2023-10-01 PROCEDURE — A4216 STERILE WATER/SALINE, 10 ML: HCPCS | Performed by: NURSE PRACTITIONER

## 2023-10-01 PROCEDURE — 36415 COLL VENOUS BLD VENIPUNCTURE: CPT

## 2023-10-01 PROCEDURE — 83735 ASSAY OF MAGNESIUM: CPT

## 2023-10-01 PROCEDURE — 2580000003 HC RX 258: Performed by: STUDENT IN AN ORGANIZED HEALTH CARE EDUCATION/TRAINING PROGRAM

## 2023-10-01 PROCEDURE — 84100 ASSAY OF PHOSPHORUS: CPT

## 2023-10-01 PROCEDURE — 6360000002 HC RX W HCPCS: Performed by: STUDENT IN AN ORGANIZED HEALTH CARE EDUCATION/TRAINING PROGRAM

## 2023-10-01 PROCEDURE — 97161 PT EVAL LOW COMPLEX 20 MIN: CPT

## 2023-10-01 PROCEDURE — 2580000003 HC RX 258: Performed by: NURSE PRACTITIONER

## 2023-10-01 PROCEDURE — 97165 OT EVAL LOW COMPLEX 30 MIN: CPT

## 2023-10-01 RX ADMIN — SODIUM CHLORIDE, PRESERVATIVE FREE 40 MG: 5 INJECTION INTRAVENOUS at 09:34

## 2023-10-01 RX ADMIN — SODIUM CHLORIDE 1000 MG: 900 INJECTION INTRAVENOUS at 21:21

## 2023-10-01 RX ADMIN — SODIUM CHLORIDE, PRESERVATIVE FREE 40 MG: 5 INJECTION INTRAVENOUS at 21:24

## 2023-10-01 RX ADMIN — SODIUM CHLORIDE, PRESERVATIVE FREE 10 ML: 5 INJECTION INTRAVENOUS at 21:30

## 2023-10-01 RX ADMIN — SODIUM CHLORIDE, PRESERVATIVE FREE 10 ML: 5 INJECTION INTRAVENOUS at 09:22

## 2023-10-01 ASSESSMENT — PAIN SCALES - GENERAL
PAINLEVEL_OUTOF10: 0
PAINLEVEL_OUTOF10: 0

## 2023-10-01 NOTE — PLAN OF CARE
Problem: Physical Therapy - Adult  Goal: By Discharge: Performs mobility at highest level of function for planned discharge setting. See evaluation for individualized goals. Description: FUNCTIONAL STATUS PRIOR TO ADMISSION: Patient was independent and active without use of DME.    HOME SUPPORT PRIOR TO ADMISSION: The patient lived with family. Physical Therapy Goals  Initiated 10/1/2023  1. Patient will move from supine to sit and sit to supine in bed with independence within 7 day(s). 2.  Patient will perform sit to stand with modified independence within 7 day(s). 3.  Patient will transfer from bed to chair and chair to bed with modified independence using the least restrictive device within 7 day(s). 4.  Patient will ambulate with modified independence for 300 feet with the least restrictive device within 7 day(s). 5.  Patient will ascend/descend 13 stairs with 1 handrail(s) with modified independence within 7 day(s). Outcome: Progressing    PHYSICAL THERAPY EVALUATION    Patient: Qiana Lawson (78 y.o. adult)  Date: 10/1/2023  Primary Diagnosis: Transaminitis [R74.01]  Procedure(s) (LRB):  EGD ESOPHAGOGASTRODUODENOSCOPY (N/A) 4 Days Post-Op   Precautions: Fall Risk                    ASSESSMENT :   DEFICITS/IMPAIRMENTS:   The patient is limited by decreased strength, activity tolerance, endurance, balance     Based on the impairments listed above pt is below functional baseline. Pt was received sitting up in the chair and cleared by nursing to mobilize. He reported ambulating around the room on his own with RW. He tolerated longer distance ambulation well. Expect to progress well with continued mobility. Patient will benefit from skilled intervention to address the above impairments. Functional Outcome Measure: The patient scored 21/24 on the [facility] outcome measure which is indicative of decreased functional mobility.            PLAN :  Recommendations and Planned Interventions:

## 2023-10-01 NOTE — PLAN OF CARE
Problem: Occupational Therapy - Adult  Goal: By Discharge: Performs self-care activities at highest level of function for planned discharge setting. See evaluation for individualized goals. Description: FUNCTIONAL STATUS PRIOR TO ADMISSION:  Patient was modified independent for functional mobility using cane prn. He enjoys sitting on the patio and driving his car to the store. Active : Yes     HOME SUPPORT: Patient lived with family. Occupational Therapy Goals:  Initiated 10/1/2023  1. Patient will perform standing grooming routine, including item retrieval, with Modified [place] within 7 day(s). 2.  Patient will perform lower-body dressing with Modified [place] using AE prn within 7 day(s). 3.  Patient will perform light home management with Modified [place] within 7 day(s). 4.  Patient will utilize energy conservation and fall prevention techniques during functional activities without cues within 7 day(s). 10/1/2023 1253 by Meryle Sjogren, OT  Outcome: Progressing   OCCUPATIONAL THERAPY EVALUATION    Patient: Shelton Marin (78 y.o. adult)  Date: 10/1/2023  Primary Diagnosis: Transaminitis [R74.01]  Procedure(s) (LRB):  EGD ESOPHAGOGASTRODUODENOSCOPY (N/A) 4 Days Post-Op     Precautions: Fall Risk                  ASSESSMENT :  The patient is limited by decreased functional mobility, independence in ADLs, high-level IADLs, ROM, strength, activity tolerance, safety awareness, balance, and posture. Patient is received in bedside chair, agreeable to session. Although typically no device used, patient benefits from RW today for functional mobility. Patient demonstrates ability to briefly maintain static unsupported standing, however requires UE support for dynamic task engagement. Cues needed for RW management, hand placement during transfers, and safety.  Patient receptive to education regarding benefits of increased activity/mobility to build strength and endurance given

## 2023-10-02 PROCEDURE — 6360000002 HC RX W HCPCS: Performed by: INTERNAL MEDICINE

## 2023-10-02 PROCEDURE — 1100000003 HC PRIVATE W/ TELEMETRY

## 2023-10-02 PROCEDURE — 6370000000 HC RX 637 (ALT 250 FOR IP): Performed by: INTERNAL MEDICINE

## 2023-10-02 RX ORDER — SODIUM BICARBONATE 650 MG/1
650 TABLET ORAL 3 TIMES DAILY
Status: DISCONTINUED | OUTPATIENT
Start: 2023-10-02 | End: 2023-10-03 | Stop reason: HOSPADM

## 2023-10-02 RX ADMIN — SODIUM BICARBONATE 650 MG: 650 TABLET ORAL at 20:37

## 2023-10-02 RX ADMIN — SODIUM BICARBONATE 650 MG: 650 TABLET ORAL at 14:05

## 2023-10-02 RX ADMIN — EPOETIN ALFA 10000 UNITS: 10000 SOLUTION INTRAVENOUS; SUBCUTANEOUS at 20:37

## 2023-10-02 NOTE — CARE COORDINATION
Transition of Care Plan:     RUR: 15%  Prior Level of Functioning:   Disposition: Home [facility] [address]  If SNF or IPR: Date FOC offered:   Date FOC received:   Accepting facility:   Date authorization started with reference number:   Date authorization received and expires: Follow up appointments:   DME needed: r/w-[facility]-delivered to pt in room  Transportation at discharge: family  IM/IMM Medicare/ letter given: 2nd IM given  Is patient a [place] and connected with [place]? If yes, was [employer] transfer form completed and [facility] notified? Caregiver Contact:   Discharge Caregiver contacted prior to discharge? pt will contact  Care Conference needed? Barriers to discharge:     3:43    [facility] approved r/w & CM delivered to pt in room. [facility]. CM will send referral via [employer]. CM also sent a referral to [facility] for a r/w. CM will continue to follow.     [facility]  Ext [phone]

## 2023-10-03 VITALS
HEART RATE: 69 BPM | TEMPERATURE: 98.2 F | DIASTOLIC BLOOD PRESSURE: 59 MMHG | BODY MASS INDEX: 33.09 KG/M2 | SYSTOLIC BLOOD PRESSURE: 114 MMHG | OXYGEN SATURATION: 96 % | HEIGHT: 71 IN | RESPIRATION RATE: 17 BRPM | WEIGHT: 236.33 LBS

## 2023-10-03 PROCEDURE — 97535 SELF CARE MNGMENT TRAINING: CPT | Performed by: OCCUPATIONAL THERAPIST

## 2023-10-03 PROCEDURE — 6370000000 HC RX 637 (ALT 250 FOR IP): Performed by: INTERNAL MEDICINE

## 2023-10-03 PROCEDURE — 97116 GAIT TRAINING THERAPY: CPT | Performed by: PHYSICAL THERAPIST

## 2023-10-03 RX ORDER — PANTOPRAZOLE SODIUM 40 MG/1
40 TABLET, DELAYED RELEASE ORAL
Qty: 60 TABLET | Refills: 0 | Status: SHIPPED | OUTPATIENT
Start: 2023-10-03 | End: 2023-11-02

## 2023-10-03 RX ORDER — FUROSEMIDE 40 MG/1
40 TABLET ORAL DAILY
Qty: 60 TABLET | Refills: 3 | Status: SHIPPED | OUTPATIENT
Start: 2023-10-03 | End: 2023-10-03 | Stop reason: SDUPTHER

## 2023-10-03 RX ORDER — FUROSEMIDE 40 MG/1
40 TABLET ORAL DAILY
Qty: 30 TABLET | Refills: 0 | Status: SHIPPED | OUTPATIENT
Start: 2023-10-03 | End: 2023-11-02

## 2023-10-03 RX ORDER — SODIUM BICARBONATE 650 MG/1
650 TABLET ORAL 3 TIMES DAILY
Qty: 30 TABLET | Refills: 0 | Status: SHIPPED | OUTPATIENT
Start: 2023-10-03 | End: 2023-11-02

## 2023-10-03 RX ORDER — PANTOPRAZOLE SODIUM 40 MG/1
40 TABLET, DELAYED RELEASE ORAL
Status: DISCONTINUED | OUTPATIENT
Start: 2023-10-03 | End: 2023-10-03 | Stop reason: HOSPADM

## 2023-10-03 RX ORDER — ONDANSETRON 4 MG/1
4 TABLET, ORALLY DISINTEGRATING ORAL EVERY 8 HOURS PRN
Qty: 30 TABLET | Refills: 0 | Status: SHIPPED | OUTPATIENT
Start: 2023-10-03

## 2023-10-03 RX ORDER — DILTIAZEM HYDROCHLORIDE 240 MG/1
CAPSULE, EXTENDED RELEASE ORAL
Qty: 30 CAPSULE | Refills: 0 | Status: SHIPPED | OUTPATIENT
Start: 2023-10-03

## 2023-10-03 RX ADMIN — SODIUM BICARBONATE 650 MG: 650 TABLET ORAL at 09:31

## 2023-10-03 RX ADMIN — PANTOPRAZOLE SODIUM 40 MG: 40 TABLET, DELAYED RELEASE ORAL at 09:31

## 2023-10-03 NOTE — PLAN OF CARE
Problem: Discharge Planning  Goal: Discharge to home or other facility with appropriate resources  Outcome: Progressing     Problem: Safety - Adult  Goal: Free from fall injury  10/2/2023 2223 by Jose Elias Brown RN  Outcome: Progressing  10/2/2023 1043 by Sonia Holden LPN  Outcome: Progressing     Problem: ABCDS Injury Assessment  Goal: Absence of physical injury  Outcome: Progressing     Problem: Pain  Goal: Verbalizes/displays adequate comfort level or baseline comfort level  Outcome: Progressing

## 2023-10-03 NOTE — PLAN OF CARE
Problem: Occupational Therapy - Adult  Goal: By Discharge: Performs self-care activities at highest level of function for planned discharge setting. See evaluation for individualized goals. Description: FUNCTIONAL STATUS PRIOR TO ADMISSION:  Patient was modified independent for functional mobility using cane prn. He enjoys sitting on the patio and driving his car to the store. Active : Yes     HOME SUPPORT: Patient lived with family. Occupational Therapy Goals:  Initiated 10/1/2023  1. Patient will perform standing grooming routine, including item retrieval, with Modified [place] within 7 day(s). 2.  Patient will perform lower-body dressing with Modified [place] using AE prn within 7 day(s). 3.  Patient will perform light home management with Modified [place] within 7 day(s). 4.  Patient will utilize energy conservation and fall prevention techniques during functional activities without cues within 7 day(s). Outcome: Progressing   OCCUPATIONAL THERAPY TREATMENT  Patient: Emiliana Pickard (78 y.o. adult)  Date: 10/3/2023  Primary Diagnosis: Transaminitis [R74.01]  Procedure(s) (LRB):  EGD ESOPHAGOGASTRODUODENOSCOPY (N/A) 6 Days Post-Op   Precautions: Fall Risk                Chart, occupational therapy assessment, plan of care, and goals were reviewed. ASSESSMENT  Patient continues to benefit from skilled OT services and is progressing towards goals. Pt was seated at bedside chair upon arrival.  Slides placed on RW and pt was educated on walker safety during mobility and ADLS. Over time pt has mild RLE buckling which he is able to correct with use of Rw. Educated on having strategic places to sit and the benefits of a shower chair. Recommend HHOT at discharge. PLAN :  Patient continues to benefit from skilled intervention to address the above impairments. Continue treatment per established plan of care to address goals.     Recommend with staff: mobilize with

## 2023-10-03 NOTE — CARE COORDINATION
Transition of Care Plan:     RUR: 15%  Prior Level of Functioning:   Disposition: Home [facility] [address]  If SNF or IPR: Date FOC offered:   Date FOC received:   Accepting facility:   Date authorization started with reference number:   Date authorization received and expires: Follow up appointments: on AVS  DME needed: r/w-[place]-delivered to pt in room  Transportation at discharge: family  IM/IMM Medicare/ letter given: 2nd IM given  Is patient a  and connected with [place]? If yes, was [employer] transfer form completed and [facility] notified? Caregiver Contact:   Discharge Caregiver contacted prior to discharge? pt will contact  Care Conference needed? Barriers to discharge:     Patient is d/c home today with [facility] & a r/w. No other needs identified.   Patient is ready for d/c from [address] [address]

## 2023-10-03 NOTE — PLAN OF CARE
Problem: Physical Therapy - Adult  Goal: By Discharge: Performs mobility at highest level of function for planned discharge setting. See evaluation for individualized goals. Description: FUNCTIONAL STATUS PRIOR TO ADMISSION: Patient was independent and active without use of DME.    HOME SUPPORT PRIOR TO ADMISSION: The patient lived with family. Physical Therapy Goals  Initiated 10/1/2023  1. Patient will move from supine to sit and sit to supine in bed with independence within 7 day(s). 2.  Patient will perform sit to stand with modified independence within 7 day(s). 3.  Patient will transfer from bed to chair and chair to bed with modified independence using the least restrictive device within 7 day(s). 4.  Patient will ambulate with modified independence for 300 feet with the least restrictive device within 7 day(s). 5.  Patient will ascend/descend 13 stairs with 1 handrail(s) with modified independence within 7 day(s). Outcome: Progressing   PHYSICAL THERAPY TREATMENT/DISCHARGE    Patient: Warrick Fabry. (78 y.o. adult)  Date: 10/3/2023  Diagnosis: Transaminitis [R74.01] Transaminitis  Procedure(s) (LRB):  EGD ESOPHAGOGASTRODUODENOSCOPY (N/A) 6 Days Post-Op  Precautions: Fall Risk                    ASSESSMENT:  Patient has been followed by skilled PT services and has progressed towards goals. Patient seen for stair training today. R LE remains weak. He requires supervision for ambulation using RW. Mild R knee instability noted with one episode of slight buckling but no overt LOB noted. . Patient descended/ascended 12 steps using R railing to descend and B rails to ascend steps. VC for proper sequencing. Slow but steady with no LOB noted. Patient is cleared for discharge from therapy standpoint. Recommend HHPT and OT following discharge with increased supervision for OOB mobility.        PLAN:  Maximum therapeutic benefit has been met at current level of care and patient will be discharged

## 2023-10-03 NOTE — DISCHARGE SUMMARY
week(s)      Catherine Tom, [address]  [phone]    Follow up in 1 week(s)            Total time in minutes spent coordinating this discharge (includes going over instructions, follow-up, prescriptions, and preparing report for sign off to her PCP) :  35 minutes

## 2023-10-06 LAB
LAB DIRECTOR NAME PROVIDER: NORMAL
SERPINA1 GENE MUT ANL BLD/T: NORMAL
SERPINA1 GENE MUT TESTED BLD/T: NORMAL

## 2023-10-31 PROBLEM — D63.1 ANEMIA OF CHRONIC RENAL FAILURE: Status: ACTIVE | Noted: 2023-10-31

## 2023-10-31 PROBLEM — N18.9 ANEMIA OF CHRONIC RENAL FAILURE: Status: ACTIVE | Noted: 2023-10-31

## 2023-11-03 ENCOUNTER — APPOINTMENT (OUTPATIENT)
Facility: HOSPITAL | Age: 73
End: 2023-11-03
Payer: MEDICARE

## 2023-11-03 ENCOUNTER — HOSPITAL ENCOUNTER (INPATIENT)
Facility: HOSPITAL | Age: 73
LOS: 6 days | Discharge: HOME | End: 2023-11-09
Attending: EMERGENCY MEDICINE | Admitting: INTERNAL MEDICINE
Payer: MEDICARE

## 2023-11-03 ENCOUNTER — HOSPITAL ENCOUNTER (OUTPATIENT)
Facility: HOSPITAL | Age: 73
Setting detail: INFUSION SERIES
End: 2023-11-03

## 2023-11-03 DIAGNOSIS — R17 JAUNDICE: ICD-10-CM

## 2023-11-03 DIAGNOSIS — N18.9 ANEMIA OF CHRONIC RENAL FAILURE, UNSPECIFIED CKD STAGE: ICD-10-CM

## 2023-11-03 DIAGNOSIS — R17 HIGH BILIRUBIN: Primary | ICD-10-CM

## 2023-11-03 DIAGNOSIS — M79.89 LEG SWELLING: ICD-10-CM

## 2023-11-03 DIAGNOSIS — D63.1 ANEMIA OF CHRONIC RENAL FAILURE, UNSPECIFIED CKD STAGE: ICD-10-CM

## 2023-11-03 PROBLEM — E80.6 HYPERBILIRUBINEMIA: Status: ACTIVE | Noted: 2023-11-03

## 2023-11-03 LAB
ALBUMIN SERPL-MCNC: 3.2 G/DL (ref 3.5–5)
ALBUMIN/GLOB SERPL: 0.7 (ref 1.1–2.2)
ALP SERPL-CCNC: 409 U/L (ref 45–117)
ALT SERPL-CCNC: 352 U/L (ref 12–78)
AMMONIA PLAS-SCNC: <10 UMOL/L
ANION GAP SERPL CALC-SCNC: 13 MMOL/L (ref 5–15)
APPEARANCE UR: ABNORMAL
AST SERPL-CCNC: 528 U/L (ref 15–37)
BACTERIA URNS QL MICRO: ABNORMAL /HPF
BASOPHILS # BLD: 0 K/UL (ref 0–0.1)
BASOPHILS NFR BLD: 0 % (ref 0–1)
BILIRUB SERPL-MCNC: 9.8 MG/DL (ref 0.2–1)
BILIRUB UR QL CFM: POSITIVE
BUN SERPL-MCNC: 57 MG/DL (ref 6–20)
BUN/CREAT SERPL: 14 (ref 12–20)
CALCIUM SERPL-MCNC: 10 MG/DL (ref 8.5–10.1)
CHLORIDE SERPL-SCNC: 107 MMOL/L (ref 97–108)
CO2 SERPL-SCNC: 18 MMOL/L (ref 21–32)
COLOR UR: ABNORMAL
CREAT SERPL-MCNC: 4.04 MG/DL (ref 0.7–1.3)
DIFFERENTIAL METHOD BLD: ABNORMAL
EOSINOPHIL # BLD: 0 K/UL (ref 0–0.4)
EOSINOPHIL NFR BLD: 0 % (ref 0–7)
EPITH CASTS URNS QL MICRO: ABNORMAL /LPF
ERYTHROCYTE [DISTWIDTH] IN BLOOD BY AUTOMATED COUNT: 22.2 % (ref 11.5–14.5)
GLOBULIN SER CALC-MCNC: 4.3 G/DL (ref 2–4)
GLUCOSE SERPL-MCNC: 99 MG/DL (ref 65–100)
GLUCOSE UR STRIP.AUTO-MCNC: NEGATIVE MG/DL
HCT VFR BLD AUTO: 24.4 % (ref 36.6–50.3)
HGB BLD-MCNC: 8.2 G/DL (ref 12.1–17)
HGB UR QL STRIP: ABNORMAL
IMM GRANULOCYTES # BLD AUTO: 0.1 K/UL (ref 0–0.04)
IMM GRANULOCYTES NFR BLD AUTO: 2 % (ref 0–0.5)
INR PPP: 1.8 (ref 0.9–1.1)
KETONES UR QL STRIP.AUTO: NEGATIVE MG/DL
LEUKOCYTE ESTERASE UR QL STRIP.AUTO: ABNORMAL
LIPASE SERPL-CCNC: 119 U/L (ref 13–75)
LYMPHOCYTES # BLD: 1.1 K/UL (ref 0.8–3.5)
LYMPHOCYTES NFR BLD: 16 % (ref 12–49)
MCH RBC QN AUTO: 28 PG (ref 26–34)
MCHC RBC AUTO-ENTMCNC: 33.6 G/DL (ref 30–36.5)
MCV RBC AUTO: 83.3 FL (ref 80–99)
MONOCYTES # BLD: 0.6 K/UL (ref 0–1)
MONOCYTES NFR BLD: 9 % (ref 5–13)
NEUTS SEG # BLD: 5.1 K/UL (ref 1.8–8)
NEUTS SEG NFR BLD: 73 % (ref 32–75)
NITRITE UR QL STRIP.AUTO: NEGATIVE
NRBC # BLD: 0 K/UL (ref 0–0.01)
NRBC BLD-RTO: 0 PER 100 WBC
PH UR STRIP: 5.5 (ref 5–8)
PLATELET # BLD AUTO: 302 K/UL (ref 150–400)
PLATELET COMMENT: ABNORMAL
PMV BLD AUTO: 11.6 FL (ref 8.9–12.9)
POTASSIUM SERPL-SCNC: 4.1 MMOL/L (ref 3.5–5.1)
PROT SERPL-MCNC: 7.5 G/DL (ref 6.4–8.2)
PROT UR STRIP-MCNC: 100 MG/DL
PROTHROMBIN TIME: 17.7 SEC (ref 9–11.1)
RBC # BLD AUTO: 2.93 M/UL (ref 4.1–5.7)
RBC #/AREA URNS HPF: ABNORMAL /HPF (ref 0–5)
RBC MORPH BLD: ABNORMAL
SODIUM SERPL-SCNC: 138 MMOL/L (ref 136–145)
SP GR UR REFRACTOMETRY: 1.01
URINE CULTURE IF INDICATED: ABNORMAL
UROBILINOGEN UR QL STRIP.AUTO: 1 EU/DL (ref 0.2–1)
WBC # BLD AUTO: 6.9 K/UL (ref 4.1–11.1)
WBC URNS QL MICRO: ABNORMAL /HPF (ref 0–4)

## 2023-11-03 PROCEDURE — 99285 EMERGENCY DEPT VISIT HI MDM: CPT

## 2023-11-03 PROCEDURE — 80053 COMPREHEN METABOLIC PANEL: CPT

## 2023-11-03 PROCEDURE — 1100000003 HC PRIVATE W/ TELEMETRY

## 2023-11-03 PROCEDURE — 83690 ASSAY OF LIPASE: CPT

## 2023-11-03 PROCEDURE — 81001 URINALYSIS AUTO W/SCOPE: CPT

## 2023-11-03 PROCEDURE — 82140 ASSAY OF AMMONIA: CPT

## 2023-11-03 PROCEDURE — 74176 CT ABD & PELVIS W/O CONTRAST: CPT

## 2023-11-03 PROCEDURE — 85025 COMPLETE CBC W/AUTO DIFF WBC: CPT

## 2023-11-03 PROCEDURE — 1100000000 HC RM PRIVATE

## 2023-11-03 PROCEDURE — 36415 COLL VENOUS BLD VENIPUNCTURE: CPT

## 2023-11-03 PROCEDURE — 85610 PROTHROMBIN TIME: CPT

## 2023-11-03 RX ORDER — SODIUM CHLORIDE 0.9 % (FLUSH) 0.9 %
5-40 SYRINGE (ML) INJECTION PRN
Status: DISCONTINUED | OUTPATIENT
Start: 2023-11-03 | End: 2023-11-09 | Stop reason: HOSPADM

## 2023-11-03 RX ORDER — MAGNESIUM HYDROXIDE/ALUMINUM HYDROXICE/SIMETHICONE 120; 1200; 1200 MG/30ML; MG/30ML; MG/30ML
30 SUSPENSION ORAL EVERY 6 HOURS PRN
Status: DISCONTINUED | OUTPATIENT
Start: 2023-11-03 | End: 2023-11-09 | Stop reason: HOSPADM

## 2023-11-03 RX ORDER — SODIUM CHLORIDE 0.9 % (FLUSH) 0.9 %
5-40 SYRINGE (ML) INJECTION EVERY 12 HOURS SCHEDULED
Status: DISCONTINUED | OUTPATIENT
Start: 2023-11-03 | End: 2023-11-09 | Stop reason: HOSPADM

## 2023-11-03 RX ORDER — PANTOPRAZOLE SODIUM 40 MG/1
40 TABLET, DELAYED RELEASE ORAL
Status: DISCONTINUED | OUTPATIENT
Start: 2023-11-04 | End: 2023-11-09 | Stop reason: HOSPADM

## 2023-11-03 RX ORDER — SODIUM CHLORIDE 9 MG/ML
INJECTION, SOLUTION INTRAVENOUS CONTINUOUS
Status: DISCONTINUED | OUTPATIENT
Start: 2023-11-03 | End: 2023-11-05

## 2023-11-03 RX ORDER — DILTIAZEM HYDROCHLORIDE 120 MG/1
240 CAPSULE, COATED, EXTENDED RELEASE ORAL DAILY
Status: DISCONTINUED | OUTPATIENT
Start: 2023-11-04 | End: 2023-11-09 | Stop reason: HOSPADM

## 2023-11-03 RX ORDER — LANOLIN ALCOHOL/MO/W.PET/CERES
3 CREAM (GRAM) TOPICAL NIGHTLY
Status: DISCONTINUED | OUTPATIENT
Start: 2023-11-03 | End: 2023-11-09 | Stop reason: HOSPADM

## 2023-11-03 RX ORDER — POLYETHYLENE GLYCOL 3350 17 G/17G
17 POWDER, FOR SOLUTION ORAL DAILY PRN
Status: DISCONTINUED | OUTPATIENT
Start: 2023-11-03 | End: 2023-11-09 | Stop reason: HOSPADM

## 2023-11-03 RX ORDER — SODIUM CHLORIDE 9 MG/ML
INJECTION, SOLUTION INTRAVENOUS PRN
Status: DISCONTINUED | OUTPATIENT
Start: 2023-11-03 | End: 2023-11-09 | Stop reason: HOSPADM

## 2023-11-03 RX ORDER — ONDANSETRON 2 MG/ML
4 INJECTION INTRAMUSCULAR; INTRAVENOUS EVERY 6 HOURS PRN
Status: DISCONTINUED | OUTPATIENT
Start: 2023-11-03 | End: 2023-11-09 | Stop reason: HOSPADM

## 2023-11-03 RX ORDER — ONDANSETRON 4 MG/1
4 TABLET, ORALLY DISINTEGRATING ORAL EVERY 8 HOURS PRN
Status: DISCONTINUED | OUTPATIENT
Start: 2023-11-03 | End: 2023-11-09 | Stop reason: HOSPADM

## 2023-11-03 ASSESSMENT — PAIN SCALES - GENERAL: PAINLEVEL_OUTOF10: 0

## 2023-11-04 LAB
ALBUMIN SERPL-MCNC: 3.2 G/DL (ref 3.5–5)
ALBUMIN/GLOB SERPL: 0.8 (ref 1.1–2.2)
ALP SERPL-CCNC: 408 U/L (ref 45–117)
ALT SERPL-CCNC: 361 U/L (ref 12–78)
ANION GAP SERPL CALC-SCNC: 12 MMOL/L (ref 5–15)
AST SERPL-CCNC: 540 U/L (ref 15–37)
BILIRUB SERPL-MCNC: 10 MG/DL (ref 0.2–1)
BUN SERPL-MCNC: 59 MG/DL (ref 6–20)
BUN/CREAT SERPL: 15 (ref 12–20)
CALCIUM SERPL-MCNC: 9.9 MG/DL (ref 8.5–10.1)
CHLORIDE SERPL-SCNC: 106 MMOL/L (ref 97–108)
CO2 SERPL-SCNC: 19 MMOL/L (ref 21–32)
CREAT SERPL-MCNC: 3.99 MG/DL (ref 0.7–1.3)
ERYTHROCYTE [DISTWIDTH] IN BLOOD BY AUTOMATED COUNT: 21.2 % (ref 11.5–14.5)
GLOBULIN SER CALC-MCNC: 4.2 G/DL (ref 2–4)
GLUCOSE SERPL-MCNC: 93 MG/DL (ref 65–100)
HCT VFR BLD AUTO: 25.4 % (ref 36.6–50.3)
HGB BLD-MCNC: 8.6 G/DL (ref 12.1–17)
MAGNESIUM SERPL-MCNC: 2.5 MG/DL (ref 1.6–2.4)
MCH RBC QN AUTO: 28.4 PG (ref 26–34)
MCHC RBC AUTO-ENTMCNC: 33.9 G/DL (ref 30–36.5)
MCV RBC AUTO: 83.8 FL (ref 80–99)
NRBC # BLD: 0 K/UL (ref 0–0.01)
NRBC BLD-RTO: 0 PER 100 WBC
PLATELET # BLD AUTO: 201 K/UL (ref 150–400)
PMV BLD AUTO: 9.8 FL (ref 8.9–12.9)
POTASSIUM SERPL-SCNC: 4.2 MMOL/L (ref 3.5–5.1)
PROT SERPL-MCNC: 7.4 G/DL (ref 6.4–8.2)
RBC # BLD AUTO: 3.03 M/UL (ref 4.1–5.7)
SODIUM SERPL-SCNC: 137 MMOL/L (ref 136–145)
WBC # BLD AUTO: 6.8 K/UL (ref 4.1–11.1)

## 2023-11-04 PROCEDURE — 97530 THERAPEUTIC ACTIVITIES: CPT | Performed by: PHYSICAL THERAPIST

## 2023-11-04 PROCEDURE — 97535 SELF CARE MNGMENT TRAINING: CPT | Performed by: OCCUPATIONAL THERAPIST

## 2023-11-04 PROCEDURE — 36415 COLL VENOUS BLD VENIPUNCTURE: CPT

## 2023-11-04 PROCEDURE — 83735 ASSAY OF MAGNESIUM: CPT

## 2023-11-04 PROCEDURE — 97116 GAIT TRAINING THERAPY: CPT | Performed by: PHYSICAL THERAPIST

## 2023-11-04 PROCEDURE — 97166 OT EVAL MOD COMPLEX 45 MIN: CPT | Performed by: OCCUPATIONAL THERAPIST

## 2023-11-04 PROCEDURE — 80053 COMPREHEN METABOLIC PANEL: CPT

## 2023-11-04 PROCEDURE — 97161 PT EVAL LOW COMPLEX 20 MIN: CPT | Performed by: PHYSICAL THERAPIST

## 2023-11-04 PROCEDURE — 6370000000 HC RX 637 (ALT 250 FOR IP): Performed by: INTERNAL MEDICINE

## 2023-11-04 PROCEDURE — 85027 COMPLETE CBC AUTOMATED: CPT

## 2023-11-04 PROCEDURE — 1100000003 HC PRIVATE W/ TELEMETRY

## 2023-11-04 PROCEDURE — 2580000003 HC RX 258: Performed by: INTERNAL MEDICINE

## 2023-11-04 RX ADMIN — PANTOPRAZOLE SODIUM 40 MG: 40 TABLET, DELAYED RELEASE ORAL at 15:50

## 2023-11-04 RX ADMIN — SODIUM CHLORIDE: 9 INJECTION, SOLUTION INTRAVENOUS at 01:57

## 2023-11-04 RX ADMIN — DILTIAZEM HYDROCHLORIDE 240 MG: 120 CAPSULE, COATED, EXTENDED RELEASE ORAL at 09:12

## 2023-11-04 RX ADMIN — SODIUM CHLORIDE, PRESERVATIVE FREE 10 ML: 5 INJECTION INTRAVENOUS at 20:59

## 2023-11-04 RX ADMIN — MELATONIN 3 MG: at 01:48

## 2023-11-04 RX ADMIN — SODIUM CHLORIDE, PRESERVATIVE FREE 10 ML: 5 INJECTION INTRAVENOUS at 01:49

## 2023-11-04 RX ADMIN — MELATONIN 3 MG: at 20:59

## 2023-11-04 ASSESSMENT — PAIN SCALES - GENERAL
PAINLEVEL_OUTOF10: 0

## 2023-11-04 NOTE — ED PROVIDER NOTES
EMERGENCY DEPARTMENT HISTORY AND PHYSICAL EXAM      Date: 11/3/2023  Patient Name: Warrick Fabry. History of Presenting Illness     Chief Complaint   Patient presents with    Abnormal Lab     Pt had lab drawn at [facility]-and was told lab results of Bilirubin was 11. HPI: History From: patient's family and patient, History limited by: poor historian  Warrick Fabry., 68 y.o. male with PMHx of hyperlipidemia, hypertension, and hepatitis C who presents to the ED with cc of abnormal lab value from outpatient clinic of T bilirubin of 11 obtained yesterday. On arrival today patient denies fever, chills, abdominal pain, nausea, vomiting, chest pain, shortness of breath, or decreased/changes in bowel or bladder habits. Upon exam family did not note that patient is more jaundiced with scleral icterus today states that this is new. Patient denies pruritus or other skin changes. Patient is poor historian and is unable to provide adequate recent outpatient history. Patient was recently discharged from hospital on 10/03 for right upper quadrant pain and to have peptic ulcer disease with EGD performed demonstrating large cratered ulcer in the antrum of the stomach and ongoing anemia. He also had notable transaminitis and portal vein thrombosis at that time. Liver appeared to demonstrate cirrhotic morphology with hypodensity to the left lobe and caudate. Patient then had an MRI at a later date showing cavernous transformation of the portal vein. Patient also had JOANIE on CKD stage III presenting with a creatinine of 3.91. With IV fluid repletion patient had downtrending in his creatinine and patient was advised to stop his ACE/ARB. There are no other complaints, changes, or physical findings at this time. PCP: Carol Culp MD    No current facility-administered medications on file prior to encounter.      Current Outpatient Medications on File Prior to Encounter   Medication Sig Dispense Refill    ondansetron (ZOFRAN-ODT) 4 MG disintegrating tablet Take 1 tablet by mouth every 8 hours as needed for Nausea or Vomiting 30 tablet 0    dilTIAZem (TIAZAC) 240 MG extended release capsule 1 cap(s) 30 capsule 0    pantoprazole (PROTONIX) 40 MG tablet Take 1 tablet by mouth 2 times daily (before meals) 60 tablet 0    furosemide (LASIX) 40 MG tablet Take 1 tablet by mouth daily 30 tablet 0       Past History     Past Medical History:  Past Medical History:   Diagnosis Date    Anemia of chronic renal failure 10/31/2023    Arrhythmia     irregular heartbeat - resolved after using treadmill per pt - no medication    Chronic pain     left shoulder    Hyperlipidemia     Hypertension     Liver disease     hep C       Past Surgical History:  Past Surgical History:   Procedure Laterality Date    GI      11 inches of intestine removed - d/t polyp per pt    GI      colonoscopy/EGD    UPPER GASTROINTESTINAL ENDOSCOPY N/A 2023    EGD ESOPHAGOGASTRODUODENOSCOPY performed by Ruth Sampson MD at [facility] ENDOSCOPY       Family History:  Family History   Problem Relation Age of Onset    Cancer Father        Social History:  Social History     Tobacco Use    Smoking status: Former     Packs/day: 1     Types: Cigarettes     Quit date: 2002     Years since quittin.2    Smokeless tobacco: Never    Tobacco comments:     Quit smoking: quit 13+ years ago   Substance Use Topics    Alcohol use: No    Drug use: Not Currently     Frequency: 3.0 times per week     Types: Heroin       Allergies:  No Known Allergies      Physical Exam   Physical Exam  Constitutional:       General: He is not in acute distress. Appearance: He is not toxic-appearing. HENT:      Head: Normocephalic and atraumatic. Nose: Nose normal.      Mouth/Throat:      Mouth: Mucous membranes are dry. Pharynx: Oropharynx is clear. Eyes:      General: Scleral icterus present. Pupils: Pupils are equal, round, and reactive to light. anemia is stable. ED Course: Jose Daniel Number Medications   sodium chloride flush 0.9 % injection 5-40 mL (has no administration in time range)   sodium chloride flush 0.9 % injection 5-40 mL (has no administration in time range)   0.9 % sodium chloride infusion (has no administration in time range)   ondansetron (ZOFRAN-ODT) disintegrating tablet 4 mg (has no administration in time range)     Or   ondansetron (ZOFRAN) injection 4 mg (has no administration in time range)   polyethylene glycol (GLYCOLAX) packet 17 g (has no administration in time range)   0.9 % sodium chloride infusion (has no administration in time range)   melatonin tablet 3 mg (has no administration in time range)   aluminum & magnesium hydroxide-simethicone (MAALOX) 200-200-20 MG/5ML suspension 30 mL (has no administration in time range)   dilTIAZem (CARDIZEM CD) extended release capsule 240 mg (has no administration in time range)   pantoprazole (PROTONIX) tablet 40 mg (has no administration in time range)        ED Course as of 11/03/23 2357   Fri Nov 03, 2023 1930 Lipase(!): 119 [KP]   1931 Creatinine(!): 4.04 [KP]      ED Course User Index  [KP] Memo Garza MD        Disposition:  Admission    PLAN:  1. Medication List        ASK your doctor about these medications      dilTIAZem 240 MG extended release capsule  Commonly known as: TIAZAC  1 cap(s)     furosemide 40 MG tablet  Commonly known as: LASIX  Take 1 tablet by mouth daily     ondansetron 4 MG disintegrating tablet  Commonly known as: ZOFRAN-ODT  Take 1 tablet by mouth every 8 hours as needed for Nausea or Vomiting     pantoprazole 40 MG tablet  Commonly known as: PROTONIX  Take 1 tablet by mouth 2 times daily (before meals)            2. No follow-up provider specified.   Return to ED if worse     Diagnosis     Clinical Impression: Jaundice, elevated bilirubin               Memo Garza MD  Resident  11/03/23 [id number]

## 2023-11-04 NOTE — H&P
Hospitalist Admission Note    NAME:   Mateusz Muro. : 1950   MRN: [id number]     Date/Time: 11/3/2023 10:02 PM    Patient PCP: David Brewer MD    ______________________________________________________________________  Given the patient's current clinical presentation, I have a high level of concern for decompensation if discharged from the emergency department. Complex decision making was performed, which includes reviewing the patient's available past medical records, laboratory results, and x-ray films. My assessment of this patient's clinical condition and my plan of care is as follows. Assessment / Plan:  Hyperbilirubinemia  Transaminitis  Hepatitis C treated  Hepatic steatosis  Cirrhosis with complex ascites  Patient is hemodynamically stable. We will monitor in telemetry for the initial 12 to 24 hours to make sure that his hemodynamics are stable. Follow-up with repeat CMP. GI consult for further input. This likely represents progressive liver failure. Patient has a poor prognosis and will benefit from palliative care consult to discuss goals of care. JOANIE on CKD  Baseline creatinine between 2.9-3.2. Current creatinine of 4.0 and represents acute on chronic renal failure. Gentle hydration with normal saline at 125 cc/h. Follow-up with repeat BMP. May need nephrology consult if the kidney function does not improve with IV hydration. This could be related to hepatorenal syndrome as well in the setting of progressive liver failure. Anemia  Hemoglobin of 8.2 and likely hemoconcentrated. Monitor CBC. Peptic ulcer disease  Continue with Protonix. Hypertension  Continue with Cardizem. Medical Decision Making:   I personally reviewed labs: CBC, BMP, LFT, serum ammonia, INR  I personally reviewed imaging: CT abdomen  I personally reviewed EKG:  Toxic drug monitoring: None  Discussed case with: ED provider.  After discussion I am in agreement that acuity of patient's medical condition necessitates hospital stay. Code Status: Full code  DVT Prophylaxis: SCDs  Baseline: Independent from home    Subjective:   CHIEF COMPLAINT: Bilirubin of 11    HISTORY OF PRESENT ILLNESS:     Manjinder Rodríguez is a 68 y.o.  male with PMHx significant for History of IV drug use, hepatitis C treated long time back, chronic transaminitis and elevated total bilirubin of two 5.0, CKD 4 with baseline creatinine between 2.9-3.2, chronic anemia with baseline hemoglobin between 7.1-7.8, HTN, HLD and former smoker. He was recently admitted for RUQ pain and was found to have peptic ulcer disease and portal vein thrombosis. Patient is a poor historian and hence history is limited and is based on conversation with ED physician and review of past records. Yesterday patient had outpatient lab work that showed elevated total bilirubin of 11 and. He was asked to go to the nearest ED for further evaluation and treatment. Patient denies any abdominal pain, nausea or vomiting. No recent fever or chills. No chest pain, dyspnea, palpitation or syncope. No change in his bowel or bladder habits. He reports being jaundiced for 2 to 3 days. Denies any pruritus. He denies abusing alcohol. In the ED, patient had stable vital signs. CT abdomen pelvis showed pulmonary artery hypertension, geographic hepatic steatosis, cirrhosis with new complex ascites. Incidental periumbilical fat-containing hernia. Cholelithiasis and milk of calcium. Lab work shows JOANIE on CKD with a BUN/creatinine of 57/4.0. Previous creatinine of 2.9. Metabolic acidosis with bicarbonate of 18. Serum ammonia negative. Transaminitis with , ALT of 352, alk phos of 409 and total bilirubin of 9.8. Lipase 119. INR of 1.8. Rest of the CBC, BMP and LFT within normal limits. Urinalysis shows a cloudy urine with trace blood, 100 protein, trace leukocyte esterase.     Patient was treated as progressively worsening

## 2023-11-04 NOTE — PROGRESS NOTES
Approached Pt multiple times to bladder scan, Pt declined each time. Tried explaining why, Pt confused due to he is urinating. Pt has utilized bathroom 3 times and urinal x 3 with >200 output this shift. Will apprise oncoming staff.

## 2023-11-04 NOTE — PROGRESS NOTES
Comprehensive Nutrition Assessment    Type and Reason for Visit:  Initial, Positive Nutrition Screen    Nutrition Recommendations/Plan:   Advance to a regular/2g Na diet  Add high kcal/high protein supplement daily once diet advanced     Malnutrition Assessment:  Malnutrition Status: Moderate malnutrition (11/04/23 1037)    Context:  Chronic Illness     Findings of the 6 clinical characteristics of malnutrition:  Energy Intake:  Mild decrease in energy intake (Comment)  Weight Loss:  Unable to assess     Body Fat Loss:  Mild body fat loss Triceps   Muscle Mass Loss:  Severe muscle mass loss Clavicles (pectoralis & deltoids), Temples (temporalis), Thigh (quadraceps)  Fluid Accumulation:  Mild Extremities   Strength:  Not Performed    Nutrition Assessment:    Patient medically noted for hyperbilirubinemia, hepatic steatosis, Hep C, cirrhosis, and ascites. PMH HTN, CKD, and PUD. Patient currently NPO. GI consulted. He states he could eat a horse this morning. Appetite isn't great at home; he eats something at each meal but he is not eating everything on his plate. He drinks supplements to help with intake. He reports he used to weigh 343# but is unable to state when he weighed that much or timeframe for weight loss. Muscle wasting and fat loss noted on NFPE; patient states he can tell the difference in his thighs and arms. Will add ensure enlive once diet advanced. Nutrition Related Findings:    BG 46-41-   BM 11/3   2+ edema BLE   Protonix   Wound Type: None       Current Nutrition Intake & Therapies:          Diet NPO    Anthropometric Measures:  Height: 180.3 cm (5' 11\")  Ideal Body Weight (IBW): 172 lbs (78 kg)       Current Body Weight: 105.7 kg (233 lb 0.4 oz),   IBW. Current BMI (kg/m2): 32.5                          BMI Categories: Obese Class 1 (BMI 30.0-34. 9)    Estimated Daily Nutrient Needs:  Energy Requirements Based On: Formula  Weight Used for Energy Requirements: Current  Energy (kcal/day): 2373 kcals (BMR x 1. 3AF)  Weight Used for Protein Requirements: Current  Protein (g/day): 106g (1.0 g/kg bw)  Method Used for Fluid Requirements: Other (Comment)  Fluid (ml/day): per MD    Nutrition Diagnosis:   Inadequate protein-energy intake related to altered GI function as evidenced by NPO or clear liquid status due to medical condition    Nutrition Interventions:   Food and/or Nutrient Delivery: Start Oral Diet, Start Oral Nutrition Supplement  Nutrition Education/Counseling: No recommendation at this time  Coordination of Nutrition Care: Continue to monitor while inpatient       Goals:     Goals: Initiate PO diet, PO intake 75% or greater, by next RD assessment       Nutrition Monitoring and Evaluation:   Behavioral-Environmental Outcomes: None Identified  Food/Nutrient Intake Outcomes: Diet Advancement/Tolerance, Food and Nutrient Intake, Supplement Intake  Physical Signs/Symptoms Outcomes: Biochemical Data, Weight    Discharge Planning:     (Regular/2g Na diet + ONS)     Bertha Milian RD  Contact: ext [phone]

## 2023-11-04 NOTE — PLAN OF CARE
Problem: Physical Therapy - Adult  Goal: By Discharge: Performs mobility at highest level of function for planned discharge setting. See evaluation for individualized goals. Description: FUNCTIONAL STATUS PRIOR TO ADMISSION: Patient was modified independent using a rolling walker for functional mobility. Reports difficulty on stairs. His only bathroom is on the second floor and has to navigate the stairs several times/day    HOME [address] [place] [address] Drive: The patient lived with family and required  assistance  for donning shoes and socks. Physical Therapy Goals  Initiated 11/4/2023  1. Patient will move from supine to sit and sit to supine , scoot up and down, and roll side to side in bed with modified independence within 7 day(s). 2.  Patient will transfer from bed to chair and chair to bed with modified independence using the least restrictive device within 7 day(s). 3.  Patient will perform sit to stand with modified independence within 7 day(s). 4.  Patient will ambulate with modified independence for 100 feet with the least restrictive device within 7 day(s). 5.  Patient will ascend/descend 14 stairs with 2 handrail(s) with supervision/set-up within 7 day(s). Outcome: Progressing   PHYSICAL THERAPY EVALUATION    Patient: Isatu Carranza. (78 y.o. male)  Date: 11/4/2023  Primary Diagnosis: Hyperbilirubinemia [E80.6]  Jaundice [R17]  High bilirubin [R17]       Precautions:   falls                   ASSESSMENT :   Patient seen for PT evaluation following admission for hyperbilirubinemia with jaundice. Patient presents generalized weakness, decreased endurance and impaired functional mobility and balance which impair his overall functional independence. Patient requires additional time for overall mobility. He is able to complete bed mobility with supervision. He requires SBA with VC for safety for transfers and was able to ambulate 25 feet in room using RW with CGA.  Gait is slow demonstrating Situation:  Social/Functional History  Lives With: Family  Type of Home: House  Home Layout: Two level (no bathroom on 1st floor)  Home Access: Stairs to enter with rails  Entrance Stairs - Number of Steps: 5  Entrance Stairs - Rails: Both  Bathroom Shower/Tub: Walk-in shower  Bathroom Equipment: Grab bars in shower  Bathroom Accessibility: Not accessible  Home Equipment: Matt Ore, rolling  Has the patient had two or more falls in the past year or any fall with injury in the past year?: No  Receives Help From: Family  Ambulation Assistance: Independent  Transfer Assistance: Independent  Active : Yes    Cognitive/Behavioral Status:   Patient is alert and oriented x 4          Strength:    Strength: Generally decreased, functional    Tone & Sensation:   Tone: Normal  Sensation: Intact    Coordination:  Coordination: Within functional limits    Range Of Motion:  AROM: Generally decreased, functional       Functional Mobility:  Bed Mobility:     Bed Mobility Training  Bed Mobility Training: Yes  Supine to Sit: Supervision; Additional time (with head of bed elevated)  Scooting: Modified independent; Additional time  Transfers:     Transfer Training  Transfer Training: Yes  Interventions: Verbal cues; Safety awareness training  Sit to Stand: Stand-by assistance; Additional time  Stand to Sit: Stand-by assistance; Additional time  Bed to Chair: Stand-by assistance; Additional time  Balance:               Balance  Sitting: Intact  Standing: Impaired  Standing - Static: Good;Constant support  Standing - Dynamic: Good;Constant support  Ambulation/Gait Training:       Gait  Overall Level of Assistance: Contact-guard assistance  Distance (ft): 25 Feet  Assistive Device: Walker, rolling  Base of Support: Widened  Speed/Rebekah: Pace decreased (< 100 feet/min); Slow  Step Length: Left shortened;Right shortened  Gait Abnormalities: Decreased step clearance       Activity Tolerance:   Fair  and requires frequent rest

## 2023-11-04 NOTE — PROGRESS NOTES
Hospitalist Progress Note    NAME:   Caroline Khan. : 1950   MRN: [id number]     Date/Time: 2023 4:58 PM  Patient PCP: Carlos Bird MD    Estimated discharge date:   Barriers: renal improvement      Assessment / Plan:    Hyperbilirubinemia  Transaminitis  Hepatitis C treated  Hepatic steatosis  Cirrhosis with complex ascites  Patient is hemodynamically stable. We will monitor in telemetry for the initial 12 to 24 hours to make sure that his hemodynamics are stable. Follow-up with repeat CMP. GI consulted, likely worsening liver function but no HE, no GIB. No need for paracentesis, asymptomatic from gallstones thus does not need Surgical consult. Followup with Dr. Peter Sy at discharge  - trend LFTs  - Patient has a poor prognosis and will benefit from palliative care consult to discuss goals of care. JOANIE on CKD  Baseline creatinine between 2.9-3.2. Current creatinine of 4.0 and represents acute on chronic renal failure. Gentle hydration with normal saline at 125 cc/h. Follow-up with repeat BMP. - consulted Renal, appreciate assistance     Anemia  Hemoglobin of 8.2 and likely hemoconcentrated. Monitor CBC. Peptic ulcer disease  Continue with Protonix. Hypertension  Continue with Cardizem. PT recced [facility]           Medical Decision Making:   I personally reviewed labs: CBC, BMP, LFT, INR  I personally reviewed imaging: prior CT abdomen  I personally reviewed EKG:  Toxic drug monitoring: None  Discussed case with:          Code Status: Full code  DVT Prophylaxis: SCDs  Baseline: Independent from home    Subjective:     Chief Complaint / Reason for Physician Visit  \"Followup hyperbilirubinemia\". Discussed with RN events overnight. Feels ok today, has no complaints. Objective:     VITALS:   Last 24hrs VS reviewed since prior progress note.  Most recent are:  Patient Vitals for the past 24 hrs:   BP Temp Temp src Pulse Resp SpO2 Height Weight   23

## 2023-11-04 NOTE — PLAN OF CARE
Problem: Discharge Planning  Goal: Discharge to home or other facility with appropriate resources  Outcome: Progressing  Flowsheets (Taken 11/4/2023 0819)  Discharge to home or other facility with appropriate resources:   Identify barriers to discharge with patient and caregiver   Arrange for needed discharge resources and transportation as appropriate   Identify discharge learning needs (meds, wound care, etc)     Problem: Pain  Goal: Verbalizes/displays adequate comfort level or baseline comfort level  Outcome: Progressing  Flowsheets (Taken 11/4/2023 0819)  Verbalizes/displays adequate comfort level or baseline comfort level:   Encourage patient to monitor pain and request assistance   Assess pain using appropriate pain scale   Administer analgesics based on type and severity of pain and evaluate response   Implement non-pharmacological measures as appropriate and evaluate response     Problem: Skin/Tissue Integrity  Goal: Absence of new skin breakdown  Description: 1. Monitor for areas of redness and/or skin breakdown  2. Assess vascular access sites hourly  3. Every 4-6 hours minimum:  Change oxygen saturation probe site  4. Every 4-6 hours:  If on nasal continuous positive airway pressure, respiratory therapy assess nares and determine need for appliance change or resting period.   Outcome: Progressing     Problem: Safety - Adult  Goal: Free from fall injury  Outcome: Progressing  Flowsheets (Taken 11/4/2023 0819)  Free From Fall Injury: Instruct family/caregiver on patient safety

## 2023-11-05 ENCOUNTER — APPOINTMENT (OUTPATIENT)
Facility: HOSPITAL | Age: 73
End: 2023-11-05
Payer: MEDICARE

## 2023-11-05 LAB
ALBUMIN SERPL-MCNC: 2.9 G/DL (ref 3.5–5)
ALBUMIN/GLOB SERPL: 0.7 (ref 1.1–2.2)
ALP SERPL-CCNC: 377 U/L (ref 45–117)
ALT SERPL-CCNC: 361 U/L (ref 12–78)
ANION GAP SERPL CALC-SCNC: 13 MMOL/L (ref 5–15)
AST SERPL-CCNC: 501 U/L (ref 15–37)
BASOPHILS # BLD: 0 K/UL (ref 0–0.1)
BASOPHILS NFR BLD: 0 % (ref 0–1)
BILIRUB DIRECT SERPL-MCNC: 7.7 MG/DL (ref 0–0.2)
BILIRUB SERPL-MCNC: 9.1 MG/DL (ref 0.2–1)
BUN SERPL-MCNC: 55 MG/DL (ref 6–20)
BUN/CREAT SERPL: 15 (ref 12–20)
CALCIUM SERPL-MCNC: 9.2 MG/DL (ref 8.5–10.1)
CHLORIDE SERPL-SCNC: 106 MMOL/L (ref 97–108)
CO2 SERPL-SCNC: 19 MMOL/L (ref 21–32)
CREAT SERPL-MCNC: 3.68 MG/DL (ref 0.7–1.3)
DIFFERENTIAL METHOD BLD: ABNORMAL
EOSINOPHIL # BLD: 0.1 K/UL (ref 0–0.4)
EOSINOPHIL NFR BLD: 1 % (ref 0–7)
ERYTHROCYTE [DISTWIDTH] IN BLOOD BY AUTOMATED COUNT: 21.3 % (ref 11.5–14.5)
GLOBULIN SER CALC-MCNC: 3.9 G/DL (ref 2–4)
GLUCOSE SERPL-MCNC: 90 MG/DL (ref 65–100)
HCT VFR BLD AUTO: 24.7 % (ref 36.6–50.3)
HGB BLD-MCNC: 8.1 G/DL (ref 12.1–17)
IMM GRANULOCYTES # BLD AUTO: 0 K/UL (ref 0–0.04)
IMM GRANULOCYTES NFR BLD AUTO: 0 % (ref 0–0.5)
INR PPP: 1.8 (ref 0.9–1.1)
LYMPHOCYTES # BLD: 0.9 K/UL (ref 0.8–3.5)
LYMPHOCYTES NFR BLD: 16 % (ref 12–49)
MAGNESIUM SERPL-MCNC: 2.1 MG/DL (ref 1.6–2.4)
MCH RBC QN AUTO: 28.3 PG (ref 26–34)
MCHC RBC AUTO-ENTMCNC: 32.8 G/DL (ref 30–36.5)
MCV RBC AUTO: 86.4 FL (ref 80–99)
MONOCYTES # BLD: 0.5 K/UL (ref 0–1)
MONOCYTES NFR BLD: 9 % (ref 5–13)
NEUTS SEG # BLD: 4.4 K/UL (ref 1.8–8)
NEUTS SEG NFR BLD: 74 % (ref 32–75)
NRBC # BLD: 0 K/UL (ref 0–0.01)
NRBC BLD-RTO: 0 PER 100 WBC
PHOSPHATE SERPL-MCNC: 2.7 MG/DL (ref 2.6–4.7)
PLATELET # BLD AUTO: 195 K/UL (ref 150–400)
PMV BLD AUTO: 10.7 FL (ref 8.9–12.9)
POTASSIUM SERPL-SCNC: 3.8 MMOL/L (ref 3.5–5.1)
PROT SERPL-MCNC: 6.8 G/DL (ref 6.4–8.2)
PROTHROMBIN TIME: 18.3 SEC (ref 9–11.1)
RBC # BLD AUTO: 2.86 M/UL (ref 4.1–5.7)
RBC MORPH BLD: ABNORMAL
RBC MORPH BLD: ABNORMAL
SODIUM SERPL-SCNC: 138 MMOL/L (ref 136–145)
WBC # BLD AUTO: 5.9 K/UL (ref 4.1–11.1)

## 2023-11-05 PROCEDURE — 6370000000 HC RX 637 (ALT 250 FOR IP): Performed by: INTERNAL MEDICINE

## 2023-11-05 PROCEDURE — 2580000003 HC RX 258: Performed by: INTERNAL MEDICINE

## 2023-11-05 PROCEDURE — 2580000003 HC RX 258: Performed by: NURSE PRACTITIONER

## 2023-11-05 PROCEDURE — 85025 COMPLETE CBC W/AUTO DIFF WBC: CPT

## 2023-11-05 PROCEDURE — 2580000003 HC RX 258: Performed by: STUDENT IN AN ORGANIZED HEALTH CARE EDUCATION/TRAINING PROGRAM

## 2023-11-05 PROCEDURE — 36415 COLL VENOUS BLD VENIPUNCTURE: CPT

## 2023-11-05 PROCEDURE — 1100000003 HC PRIVATE W/ TELEMETRY

## 2023-11-05 PROCEDURE — 6360000002 HC RX W HCPCS: Performed by: STUDENT IN AN ORGANIZED HEALTH CARE EDUCATION/TRAINING PROGRAM

## 2023-11-05 PROCEDURE — 84100 ASSAY OF PHOSPHORUS: CPT

## 2023-11-05 PROCEDURE — 85610 PROTHROMBIN TIME: CPT

## 2023-11-05 PROCEDURE — 6360000002 HC RX W HCPCS: Performed by: NURSE PRACTITIONER

## 2023-11-05 PROCEDURE — 80048 BASIC METABOLIC PNL TOTAL CA: CPT

## 2023-11-05 PROCEDURE — 71045 X-RAY EXAM CHEST 1 VIEW: CPT

## 2023-11-05 PROCEDURE — 83735 ASSAY OF MAGNESIUM: CPT

## 2023-11-05 PROCEDURE — 80076 HEPATIC FUNCTION PANEL: CPT

## 2023-11-05 RX ORDER — SODIUM CHLORIDE, SODIUM LACTATE, POTASSIUM CHLORIDE, CALCIUM CHLORIDE 600; 310; 30; 20 MG/100ML; MG/100ML; MG/100ML; MG/100ML
INJECTION, SOLUTION INTRAVENOUS CONTINUOUS
Status: DISCONTINUED | OUTPATIENT
Start: 2023-11-05 | End: 2023-11-06

## 2023-11-05 RX ADMIN — DILTIAZEM HYDROCHLORIDE 240 MG: 120 CAPSULE, COATED, EXTENDED RELEASE ORAL at 09:05

## 2023-11-05 RX ADMIN — CEFTRIAXONE SODIUM 2000 MG: 2 INJECTION, POWDER, FOR SOLUTION INTRAMUSCULAR; INTRAVENOUS at 11:59

## 2023-11-05 RX ADMIN — SODIUM CHLORIDE, POTASSIUM CHLORIDE, SODIUM LACTATE AND CALCIUM CHLORIDE: 600; 310; 30; 20 INJECTION, SOLUTION INTRAVENOUS at 16:52

## 2023-11-05 RX ADMIN — SODIUM CHLORIDE: 9 INJECTION, SOLUTION INTRAVENOUS at 01:40

## 2023-11-05 RX ADMIN — MELATONIN 3 MG: at 21:19

## 2023-11-05 RX ADMIN — SODIUM CHLORIDE, PRESERVATIVE FREE 10 ML: 5 INJECTION INTRAVENOUS at 21:19

## 2023-11-05 RX ADMIN — PANTOPRAZOLE SODIUM 40 MG: 40 TABLET, DELAYED RELEASE ORAL at 06:00

## 2023-11-05 RX ADMIN — AZITHROMYCIN MONOHYDRATE 500 MG: 500 INJECTION, POWDER, LYOPHILIZED, FOR SOLUTION INTRAVENOUS at 12:00

## 2023-11-05 RX ADMIN — PANTOPRAZOLE SODIUM 40 MG: 40 TABLET, DELAYED RELEASE ORAL at 16:52

## 2023-11-05 RX ADMIN — SODIUM CHLORIDE, PRESERVATIVE FREE 10 ML: 5 INJECTION INTRAVENOUS at 09:07

## 2023-11-05 RX ADMIN — ERYTHROPOIETIN 20000 UNITS: 20000 INJECTION, SOLUTION INTRAVENOUS; SUBCUTANEOUS at 00:20

## 2023-11-05 ASSESSMENT — PAIN SCALES - GENERAL
PAINLEVEL_OUTOF10: 0
PAINLEVEL_OUTOF10: 0

## 2023-11-05 NOTE — PLAN OF CARE
Problem: Discharge Planning  Goal: Discharge to home or other facility with appropriate resources  11/4/2023 2229 by Shahana Jenkins RN  Outcome: Progressing  Flowsheets (Taken 11/4/2023 2000)  Discharge to home or other facility with appropriate resources: Identify barriers to discharge with patient and caregiver  11/4/2023 1057 by Kenyon Nicolas RN  Outcome: Progressing  Flowsheets (Taken 11/4/2023 2709)  Discharge to home or other facility with appropriate resources:   Identify barriers to discharge with patient and caregiver   Arrange for needed discharge resources and transportation as appropriate   Identify discharge learning needs (meds, wound care, etc)     Problem: Pain  Goal: Verbalizes/displays adequate comfort level or baseline comfort level  11/4/2023 2229 by Shahana Jenkins RN  Outcome: Progressing  Flowsheets (Taken 11/4/2023 1516 by Kenyon Nicolas RN)  Verbalizes/displays adequate comfort level or baseline comfort level:   Encourage patient to monitor pain and request assistance   Assess pain using appropriate pain scale   Administer analgesics based on type and severity of pain and evaluate response   Implement non-pharmacological measures as appropriate and evaluate response  11/4/2023 1057 by Kenyon Nicolas RN  Outcome: Progressing  Flowsheets (Taken 11/4/2023 3020)  Verbalizes/displays adequate comfort level or baseline comfort level:   Encourage patient to monitor pain and request assistance   Assess pain using appropriate pain scale   Administer analgesics based on type and severity of pain and evaluate response   Implement non-pharmacological measures as appropriate and evaluate response     Problem: Skin/Tissue Integrity  Goal: Absence of new skin breakdown  Description: 1. Monitor for areas of redness and/or skin breakdown  2. Assess vascular access sites hourly  3. Every 4-6 hours minimum:  Change oxygen saturation probe site  4.   Every 4-6 hours:  If on nasal continuous evaluation for individualized goals. Description: FUNCTIONAL STATUS PRIOR TO ADMISSION:    Receives Help From: Family, ADL Assistance: Independent,  ,  ,  ,  ,  ,  , Ambulation Assistance: Independent, Transfer Assistance: Independent, Active : Yes     HOME SUPPORT: pt lives with wife and daughter who are there to assist.  Pt reports increased time and frustration with ADLs lately. Occupational Therapy Goals:  Initiated 11/4/2023  1. Patient will perform bathing with AE and Supervision within 7 day(s). 2.  Patient will perform lower body dressing with AE and  Supervision within 7 day(s). 3.  Patient will perform standing ADLs for 8 minutes without LOB with Supervision within 7 day(s). 4.  Patient will perform toilet transfers with Supervision  within 7 day(s). 5.  Patient will perform all aspects of toileting with Supervision within 7 day(s). 6.  Patient will participate in upper extremity therapeutic exercise/activities with Supervision for 10 minutes within 7 day(s). 7.  Patient will utilize energy conservation techniques during functional activities with verbal and visual cues within 7 day(s).     11/4/2023 1634 by LLOYD Morrison/L  Outcome: Progressing

## 2023-11-05 NOTE — PROGRESS NOTES
Pt continues to refuse to be bladder scanned, pt stated, \" I am peeing, why do I need that \", pt has voided 550 ml and has gotten up, amb SBA with walker to BR and voided times one this shift. Pt is voiding and in no acute distress.

## 2023-11-05 NOTE — PROGRESS NOTES
Hospitalist Progress Note    NAME:   Donna Rider. : 1950   MRN: [id number]     Date/Time: 2023 3:28 PM  Patient PCP: Gurpreet Bustamante MD    Estimated discharge date:   Barriers: renal improvement, LFT improvement      Assessment / Plan:    Hyperbilirubinemia  Transaminitis  Hepatitis C treated  Hepatic steatosis  Cirrhosis with complex ascites  Patient is hemodynamically stable. We will monitor in telemetry for the initial 12 to 24 hours to make sure that his hemodynamics are stable. Follow-up with repeat CMP. GI consulted, likely worsening liver function but no HE, no GIB. No need for paracentesis, asymptomatic from gallstones thus does not need Surgical consult. Followup with Dr. Jonathan Da Silva at discharge  - trend LFTs, remain elevated  - Patient has a poor prognosis and may benefit from palliative care consult to discuss goals of care. - ordered echo to eval EF, may have congestive hepatopathy. Last EF  34-31, mild LV diastolic dysfunction     JOANIE on CKD  Baseline creatinine between 2.9-3.2. Current creatinine of 4.0 and represents acute on chronic renal failure. Gentle hydration with normal saline at 125 cc/h. Follow-up with repeat BMP. - consulted Renal, appreciate assistance    CAP  - added abx for 5 days     Anemia  Hemoglobin of 8.2 and likely hemoconcentrated. Monitor CBC. Peptic ulcer disease  Continue with Protonix. Hypertension  Continue with Cardizem. PT recced [address] [facility],[address]        Medical Decision Making:   I personally reviewed labs: CBC, BMP, LFT, INR  I personally reviewed imaging: prior echo  I personally reviewed EKG:  Toxic drug monitoring: None  Discussed case with:          Code Status: Full code  DVT Prophylaxis: SCDs  Baseline: Independent from home    Subjective:     Chief Complaint / Reason for Physician Visit  \"Followup hyperbilirubinemia\". Discussed with RN events overnight. Mild cough today.       Objective:     VITALS:   Last 24hrs VS Recent Labs     11/03/23  1827 11/03/23  1939 11/04/23  0427 11/05/23  0218     --  137 138   K 4.1  --  4.2 3.8     --  106 106   CO2 18*  --  19* 19*   GLUCOSE 99  --  93 90   BUN 57*  --  59* 55*   CREATININE 4.04*  --  3.99* 3.68*   CALCIUM 10.0  --  9.9 9.2   MG  --   --  2.5* 2.1   PHOS  --   --   --  2.7   LABALBU 3.2*  --  3.2* 2.9*   BILITOT 9.8*  --  10.0* 9.1*   *  --  540* 501*   *  --  361* 361*   INR  --  1.8*  --  1.8*         Signed: Munira Perdomo MD

## 2023-11-06 LAB
ALBUMIN SERPL-MCNC: 3 G/DL (ref 3.5–5)
ALBUMIN/GLOB SERPL: 0.7 (ref 1.1–2.2)
ALP SERPL-CCNC: 384 U/L (ref 45–117)
ALT SERPL-CCNC: 388 U/L (ref 12–78)
ANION GAP SERPL CALC-SCNC: 14 MMOL/L (ref 5–15)
AST SERPL-CCNC: 498 U/L (ref 15–37)
BASOPHILS # BLD: 0 K/UL (ref 0–0.1)
BASOPHILS NFR BLD: 1 % (ref 0–1)
BILIRUB DIRECT SERPL-MCNC: 8 MG/DL (ref 0–0.2)
BILIRUB SERPL-MCNC: 9.8 MG/DL (ref 0.2–1)
BUN SERPL-MCNC: 53 MG/DL (ref 6–20)
BUN/CREAT SERPL: 16 (ref 12–20)
CALCIUM SERPL-MCNC: 9.3 MG/DL (ref 8.5–10.1)
CHLORIDE SERPL-SCNC: 109 MMOL/L (ref 97–108)
CO2 SERPL-SCNC: 16 MMOL/L (ref 21–32)
CREAT SERPL-MCNC: 3.23 MG/DL (ref 0.7–1.3)
DIFFERENTIAL METHOD BLD: ABNORMAL
EOSINOPHIL # BLD: 0 K/UL (ref 0–0.4)
EOSINOPHIL NFR BLD: 1 % (ref 0–7)
ERYTHROCYTE [DISTWIDTH] IN BLOOD BY AUTOMATED COUNT: 21.9 % (ref 11.5–14.5)
GLOBULIN SER CALC-MCNC: 4.1 G/DL (ref 2–4)
GLUCOSE SERPL-MCNC: 90 MG/DL (ref 65–100)
HCT VFR BLD AUTO: 27.9 % (ref 36.6–50.3)
HGB BLD-MCNC: 8.9 G/DL (ref 12.1–17)
IMM GRANULOCYTES # BLD AUTO: 0.1 K/UL (ref 0–0.04)
IMM GRANULOCYTES NFR BLD AUTO: 2 % (ref 0–0.5)
INR PPP: 1.8 (ref 0.9–1.1)
LYMPHOCYTES # BLD: 0.9 K/UL (ref 0.8–3.5)
LYMPHOCYTES NFR BLD: 13 % (ref 12–49)
MAGNESIUM SERPL-MCNC: 2.1 MG/DL (ref 1.6–2.4)
MCH RBC QN AUTO: 28.3 PG (ref 26–34)
MCHC RBC AUTO-ENTMCNC: 31.9 G/DL (ref 30–36.5)
MCV RBC AUTO: 88.9 FL (ref 80–99)
MONOCYTES # BLD: 0.6 K/UL (ref 0–1)
MONOCYTES NFR BLD: 9 % (ref 5–13)
NEUTS SEG # BLD: 5.2 K/UL (ref 1.8–8)
NEUTS SEG NFR BLD: 75 % (ref 32–75)
NRBC # BLD: 0 K/UL (ref 0–0.01)
NRBC BLD-RTO: 0 PER 100 WBC
PHOSPHATE SERPL-MCNC: 2.9 MG/DL (ref 2.6–4.7)
PLATELET # BLD AUTO: 191 K/UL (ref 150–400)
PMV BLD AUTO: 9.6 FL (ref 8.9–12.9)
POTASSIUM SERPL-SCNC: 3.8 MMOL/L (ref 3.5–5.1)
PROT SERPL-MCNC: 7.1 G/DL (ref 6.4–8.2)
PROTHROMBIN TIME: 18.4 SEC (ref 9–11.1)
RBC # BLD AUTO: 3.14 M/UL (ref 4.1–5.7)
SODIUM SERPL-SCNC: 139 MMOL/L (ref 136–145)
WBC # BLD AUTO: 7 K/UL (ref 4.1–11.1)

## 2023-11-06 PROCEDURE — 6370000000 HC RX 637 (ALT 250 FOR IP): Performed by: INTERNAL MEDICINE

## 2023-11-06 PROCEDURE — 2580000003 HC RX 258: Performed by: INTERNAL MEDICINE

## 2023-11-06 PROCEDURE — 83735 ASSAY OF MAGNESIUM: CPT

## 2023-11-06 PROCEDURE — 84100 ASSAY OF PHOSPHORUS: CPT

## 2023-11-06 PROCEDURE — 1100000003 HC PRIVATE W/ TELEMETRY

## 2023-11-06 PROCEDURE — 80076 HEPATIC FUNCTION PANEL: CPT

## 2023-11-06 PROCEDURE — 36415 COLL VENOUS BLD VENIPUNCTURE: CPT

## 2023-11-06 PROCEDURE — 2580000003 HC RX 258: Performed by: STUDENT IN AN ORGANIZED HEALTH CARE EDUCATION/TRAINING PROGRAM

## 2023-11-06 PROCEDURE — 85610 PROTHROMBIN TIME: CPT

## 2023-11-06 PROCEDURE — 6360000002 HC RX W HCPCS: Performed by: STUDENT IN AN ORGANIZED HEALTH CARE EDUCATION/TRAINING PROGRAM

## 2023-11-06 PROCEDURE — 85025 COMPLETE CBC W/AUTO DIFF WBC: CPT

## 2023-11-06 PROCEDURE — 80048 BASIC METABOLIC PNL TOTAL CA: CPT

## 2023-11-06 RX ORDER — SODIUM BICARBONATE 650 MG/1
1300 TABLET ORAL 2 TIMES DAILY
Status: DISCONTINUED | OUTPATIENT
Start: 2023-11-06 | End: 2023-11-09 | Stop reason: HOSPADM

## 2023-11-06 RX ADMIN — MELATONIN 3 MG: at 22:51

## 2023-11-06 RX ADMIN — SODIUM CHLORIDE, PRESERVATIVE FREE 10 ML: 5 INJECTION INTRAVENOUS at 10:51

## 2023-11-06 RX ADMIN — SODIUM BICARBONATE 1300 MG: 650 TABLET ORAL at 22:51

## 2023-11-06 RX ADMIN — SODIUM BICARBONATE 1300 MG: 650 TABLET ORAL at 14:13

## 2023-11-06 RX ADMIN — AZITHROMYCIN MONOHYDRATE 500 MG: 500 INJECTION, POWDER, LYOPHILIZED, FOR SOLUTION INTRAVENOUS at 10:52

## 2023-11-06 RX ADMIN — DILTIAZEM HYDROCHLORIDE 240 MG: 120 CAPSULE, COATED, EXTENDED RELEASE ORAL at 10:51

## 2023-11-06 RX ADMIN — PANTOPRAZOLE SODIUM 40 MG: 40 TABLET, DELAYED RELEASE ORAL at 05:56

## 2023-11-06 RX ADMIN — PANTOPRAZOLE SODIUM 40 MG: 40 TABLET, DELAYED RELEASE ORAL at 15:35

## 2023-11-06 RX ADMIN — SODIUM CHLORIDE, PRESERVATIVE FREE 10 ML: 5 INJECTION INTRAVENOUS at 22:51

## 2023-11-06 RX ADMIN — CEFTRIAXONE SODIUM 2000 MG: 2 INJECTION, POWDER, FOR SOLUTION INTRAMUSCULAR; INTRAVENOUS at 11:04

## 2023-11-06 ASSESSMENT — PAIN SCALES - GENERAL
PAINLEVEL_OUTOF10: 0
PAINLEVEL_OUTOF10: 0

## 2023-11-06 NOTE — PROGRESS NOTES
Physician Progress Note      PATIENT:               Roselynn Bernheim  CSN #:                  [id number]  :                       1950  ADMIT DATE:       11/3/2023 5:19 PM  [facility] DATE:  RESPONDING  PROVIDER #:        Rajinder Gacria MD          QUERY TEXT:    Pt admitted with elevated T bili. Noted documentation of Hyponatremia in Neph  pn with Na range 137-139 this   admission. In order to support the diagnosis of Hyponatremia, please include   additional clinical indicators in your documentation. Or please document if   the diagnosis of Hyponatremia has been ruled out after further study. The medical record reflects the following:  Risk Factors: Cirrhosis with complex ascites, JOANIE on CKD3  Clinical Indicators: Sodium range 137-139 this admission  Treatment: Sodium monitoring    Thank you,  Jane Crandall RN, CDI, CRCR  [email or web address]  Options provided:  -- Hyponatremia was ruled out  -- Hyponatremia present as evidenced by, Please document evidence. -- Other - I will add my own diagnosis  -- Disagree - Not applicable / Not valid  -- Disagree - Clinically unable to determine / Unknown  -- Refer to Clinical Documentation Reviewer    PROVIDER RESPONSE TEXT:    Hyponatremia was ruled out after study. Query created by: Jane Crandall on 2023 1:20 PM      QUERY TEXT:    Pt admitted with elevated T bili. Noted to have Moderate malnutrition on  pn.    Please document in progress notes and discharge summary if you are evaluating   and /or treating any of the following: The medical record reflects the following:    Risk Factors: 68year old with Hepatic steatosis, Cirrhosis with complex   ascites, PUD, and Hepatitis C treated    Clinical Indicators:  ROXANN Andrew RD pn: Malnutrition Assessment:  Malnutrition Status:   Moderate malnutrition (23 1037)  Context:  Chronic Illness  Findings of the 6 clinical characteristics of malnutrition:  Energy Intake:  Mild decrease in energy intake (Comment)  Weight Loss:  Unable to assess  Body Fat Loss:  Mild body fat loss Triceps  Muscle Mass Loss:  Severe muscle mass loss Clavicles (pectoralis & deltoids),   Temples (temporalis), Thigh (quadraceps)  Fluid Accumulation:  Mild Extremities   Strength:  Not Performed    Anthropometric Measures:  Height: 180.3 cm (5' 11\")  Ideal Body Weight (IBW): 172 lbs (78 kg)  Current Body Weight: 105.7 kg (233 lb 0.4 oz),   IBW. Current BMI (kg/m2): 32.5  BMI Categories: Obese Class 1 (BMI 30.0-34. 9)      Treatment: RD consult. RD recommendations: Advance to a regular/2g Na die and   Add high kcal/high protein supplement daily once diet advanced    ASPEN Criteria:    https://aspenjournals. onlinelibrary. wong. com/doi/full/10.1177/018039450884519  5      Thank you,  Marlena Recinos RN, CDI, CRCR  [email or web address]  Options provided:  -- Protein calorie malnutrition moderate  -- Other - I will add my own diagnosis  -- Disagree - Not applicable / Not valid  -- Disagree - Clinically unable to determine / Unknown  -- Refer to Clinical Documentation Reviewer    PROVIDER RESPONSE TEXT:    This patient has moderate protein calorie malnutrition.     Query created by: Marlena Recinos on 11/6/2023 1:24 PM      Electronically signed by:  Drue Maris Mendel MD 11/6/2023 6:27 PM

## 2023-11-06 NOTE — PLAN OF CARE
Problem: Discharge Planning  Goal: Discharge to home or other facility with appropriate resources  Outcome: Progressing  Flowsheets (Taken 11/5/2023 1945)  Discharge to home or other facility with appropriate resources: Identify barriers to discharge with patient and caregiver     Problem: Pain  Goal: Verbalizes/displays adequate comfort level or baseline comfort level  Outcome: Progressing     Problem: Skin/Tissue Integrity  Goal: Absence of new skin breakdown  Description: 1. Monitor for areas of redness and/or skin breakdown  2. Assess vascular access sites hourly  3. Every 4-6 hours minimum:  Change oxygen saturation probe site  4. Every 4-6 hours:  If on nasal continuous positive airway pressure, respiratory therapy assess nares and determine need for appliance change or resting period.   Outcome: Progressing     Problem: Safety - Adult  Goal: Free from fall injury  Outcome: Progressing

## 2023-11-06 NOTE — PROGRESS NOTES
Hospitalist Progress Note    NAME:   Bam Arriaga. : 1950   MRN: [id number]     Date/Time: 2023 6:01 PM  Patient PCP: Crow Fernandez MD    Estimated discharge date:   Barriers: echo, GI clearance      Assessment / Plan:    Hyperbilirubinemia  Transaminitis  Hepatitis C treated  Hepatic steatosis  Cirrhosis with complex ascites  Previous portal vein thrombosis with cavernous transformation  Patient is hemodynamically stable. We will monitor in telemetry for the initial 12 to 24 hours to make sure that his hemodynamics are stable. Follow-up with repeat CMP. GI consulted, likely worsening liver function but no HE, no GIB. No need for paracentesis, asymptomatic from gallstones thus does not need Surgical consult  - trend LFTs, remain elevated  - Patient has a poor prognosis and may benefit from palliative care consult to discuss goals of care. - ordered echo to eval EF, may have congestive hepatopathy. Last EF 2021, mild LV diastolic dysfunction  - per last admission, did not require anticoagulation for portal vein thrombosis as it is chronic  - followup with Dr. Nathaly Lemon at discharge     JOANIE on CKD  Baseline creatinine between 2.9-3.2. Current creatinine of 4.0 and represents acute on chronic renal failure. Gentle hydration with normal saline at 125 cc/h. Follow-up with repeat BMP. - consulted Renal, appreciate assistance    CAP  - added abx for 5 days     Anemia  Hemoglobin of 8.2 and likely hemoconcentrated. Monitor CBC. Peptic ulcer disease  Continue with Protonix. Hypertension  Continue with Cardizem. PT recced [facility]        Medical Decision Making:   I personally reviewed labs: CBC, BMP, LFT, INR  I personally reviewed imaging:   I personally reviewed EKG:  Toxic drug monitoring: None  Discussed case with:          Code Status: Full code  DVT Prophylaxis: SCDs  Baseline:  Independent from home    Subjective:     Chief Complaint / Reason for Physician

## 2023-11-07 ENCOUNTER — APPOINTMENT (OUTPATIENT)
Facility: HOSPITAL | Age: 73
End: 2023-11-07
Payer: MEDICARE

## 2023-11-07 ENCOUNTER — APPOINTMENT (OUTPATIENT)
Facility: HOSPITAL | Age: 73
End: 2023-11-07
Attending: STUDENT IN AN ORGANIZED HEALTH CARE EDUCATION/TRAINING PROGRAM
Payer: MEDICARE

## 2023-11-07 LAB
ALBUMIN FLD-MCNC: 1.9 G/DL
ALBUMIN SERPL-MCNC: 2.9 G/DL (ref 3.5–5)
ALBUMIN/GLOB SERPL: 0.7 (ref 1.1–2.2)
ALP SERPL-CCNC: 362 U/L (ref 45–117)
ALT SERPL-CCNC: 380 U/L (ref 12–78)
ANION GAP SERPL CALC-SCNC: 14 MMOL/L (ref 5–15)
APPEARANCE FLD: ABNORMAL
AST SERPL-CCNC: 456 U/L (ref 15–37)
BASOPHILS # BLD: 0 K/UL (ref 0–0.1)
BASOPHILS NFR BLD: 0 % (ref 0–1)
BILIRUB DIRECT SERPL-MCNC: 7.8 MG/DL (ref 0–0.2)
BILIRUB SERPL-MCNC: 9.9 MG/DL (ref 0.2–1)
BUN SERPL-MCNC: 59 MG/DL (ref 6–20)
BUN/CREAT SERPL: 18 (ref 12–20)
CALCIUM SERPL-MCNC: 9.2 MG/DL (ref 8.5–10.1)
CHLORIDE SERPL-SCNC: 110 MMOL/L (ref 97–108)
CO2 SERPL-SCNC: 17 MMOL/L (ref 21–32)
COLOR FLD: ABNORMAL
COMMENT:: NORMAL
CREAT SERPL-MCNC: 3.28 MG/DL (ref 0.7–1.3)
DIFFERENTIAL METHOD BLD: ABNORMAL
ECHO AV AREA PEAK VELOCITY: 3.1 CM2
ECHO AV AREA PEAK VELOCITY: 3.2 CM2
ECHO AV AREA VTI: 3 CM2
ECHO AV AREA/BSA VTI: 1.3 CM2/M2
ECHO AV CUSP MM: 2.6 CM
ECHO AV MEAN GRADIENT: 4 MMHG
ECHO AV MEAN VELOCITY: 0.9 M/S
ECHO AV PEAK GRADIENT: 9 MMHG
ECHO AV PEAK GRADIENT: 9 MMHG
ECHO AV PEAK VELOCITY: 1.5 M/S
ECHO AV PEAK VELOCITY: 1.5 M/S
ECHO AV VTI: 23.9 CM
ECHO BSA: 2.29 M2
ECHO LA DIAMETER INDEX: 1.69 CM/M2
ECHO LA DIAMETER: 3.8 CM
ECHO LA VOL A-L A2C: 53 ML (ref 18–58)
ECHO LA VOL A-L A2C: 54 ML (ref 18–58)
ECHO LA VOL A-L A4C: 59 ML (ref 18–58)
ECHO LA VOLUME AREA LENGTH: 58 ML
ECHO LA VOLUME INDEX A-L A4C: 26 ML/M2 (ref 16–34)
ECHO LA VOLUME INDEX AREA LENGTH: 26 ML/M2 (ref 16–34)
ECHO LV E' LATERAL VELOCITY: 11 CM/S
ECHO LV E' SEPTAL VELOCITY: 9 CM/S
ECHO LV FRACTIONAL SHORTENING: 27 % (ref 28–44)
ECHO LV INTERNAL DIMENSION DIASTOLE INDEX: 2.27 CM/M2
ECHO LV INTERNAL DIMENSION DIASTOLIC: 5.1 CM (ref 4.2–5.9)
ECHO LV INTERNAL DIMENSION SYSTOLIC INDEX: 1.64 CM/M2
ECHO LV INTERNAL DIMENSION SYSTOLIC: 3.7 CM
ECHO LV IVSD: 1.2 CM (ref 0.6–1)
ECHO LV MASS 2D: 241.2 G (ref 88–224)
ECHO LV MASS INDEX 2D: 107.2 G/M2 (ref 49–115)
ECHO LV POSTERIOR WALL DIASTOLIC: 1.2 CM (ref 0.6–1)
ECHO LV RELATIVE WALL THICKNESS RATIO: 0.47
ECHO LVOT AREA: 3.5 CM2
ECHO LVOT AV VTI INDEX: 0.85
ECHO LVOT DIAM: 2.1 CM
ECHO LVOT MEAN GRADIENT: 3 MMHG
ECHO LVOT PEAK GRADIENT: 7 MMHG
ECHO LVOT PEAK GRADIENT: 7 MMHG
ECHO LVOT PEAK VELOCITY: 1.3 M/S
ECHO LVOT PEAK VELOCITY: 1.4 M/S
ECHO LVOT STROKE VOLUME INDEX: 31.1 ML/M2
ECHO LVOT SV: 69.9 ML
ECHO LVOT VTI: 20.2 CM
ECHO MV AREA VTI: 2.9 CM2
ECHO MV LVOT VTI INDEX: 1.19
ECHO MV MAX VELOCITY: 1 M/S
ECHO MV MEAN GRADIENT: 2 MMHG
ECHO MV MEAN VELOCITY: 0.6 M/S
ECHO MV PEAK GRADIENT: 4 MMHG
ECHO MV VTI: 24 CM
ECHO PV MAX VELOCITY: 1.3 M/S
ECHO PV PEAK GRADIENT: 7 MMHG
ECHO RV FREE WALL PEAK S': 23 CM/S
ECHO RV INTERNAL DIMENSION: 3.5 CM
ECHO RVOT PEAK GRADIENT: 4 MMHG
ECHO RVOT PEAK VELOCITY: 1 M/S
EOSINOPHIL # BLD: 0 K/UL (ref 0–0.4)
EOSINOPHIL NFR BLD: 0 % (ref 0–7)
ERYTHROCYTE [DISTWIDTH] IN BLOOD BY AUTOMATED COUNT: 21.2 % (ref 11.5–14.5)
GLOBULIN SER CALC-MCNC: 3.9 G/DL (ref 2–4)
GLUCOSE SERPL-MCNC: 73 MG/DL (ref 65–100)
HCT VFR BLD AUTO: 25.4 % (ref 36.6–50.3)
HGB BLD-MCNC: 8.4 G/DL (ref 12.1–17)
IMM GRANULOCYTES # BLD AUTO: 0.1 K/UL (ref 0–0.04)
IMM GRANULOCYTES NFR BLD AUTO: 2 % (ref 0–0.5)
INR PPP: 1.8 (ref 0.9–1.1)
LYMPHOCYTES # BLD: 0.9 K/UL (ref 0.8–3.5)
LYMPHOCYTES NFR BLD: 13 % (ref 12–49)
LYMPHOCYTES NFR FLD: 40 %
MAGNESIUM SERPL-MCNC: 2.2 MG/DL (ref 1.6–2.4)
MCH RBC QN AUTO: 27.5 PG (ref 26–34)
MCHC RBC AUTO-ENTMCNC: 33.1 G/DL (ref 30–36.5)
MCV RBC AUTO: 83 FL (ref 80–99)
MESOTHL CELL NFR FLD: 19 %
MONOCYTES # BLD: 0.6 K/UL (ref 0–1)
MONOCYTES NFR BLD: 9 % (ref 5–13)
MONOS+MACROS NFR FLD: 33 %
NEUTROPHILS NFR FLD: 8 %
NEUTS SEG # BLD: 5.6 K/UL (ref 1.8–8)
NEUTS SEG NFR BLD: 76 % (ref 32–75)
NRBC # BLD: 0 K/UL (ref 0–0.01)
NRBC BLD-RTO: 0 PER 100 WBC
NUC CELL # FLD: 310 /CU MM
PHOSPHATE SERPL-MCNC: 3.3 MG/DL (ref 2.6–4.7)
PLATELET # BLD AUTO: 179 K/UL (ref 150–400)
PMV BLD AUTO: 9.5 FL (ref 8.9–12.9)
POTASSIUM SERPL-SCNC: 3.9 MMOL/L (ref 3.5–5.1)
PROT FLD-MCNC: 3.5 G/DL
PROT SERPL-MCNC: 6.8 G/DL (ref 6.4–8.2)
PROTHROMBIN TIME: 18.2 SEC (ref 9–11.1)
RBC # BLD AUTO: 3.06 M/UL (ref 4.1–5.7)
RBC # FLD: >100 /CU MM
RBC MORPH BLD: ABNORMAL
SODIUM SERPL-SCNC: 141 MMOL/L (ref 136–145)
SPECIMEN HOLD: NORMAL
SPECIMEN SOURCE FLD: ABNORMAL
SPECIMEN SOURCE FLD: NORMAL
SPECIMEN SOURCE FLD: NORMAL
WBC # BLD AUTO: 7.2 K/UL (ref 4.1–11.1)

## 2023-11-07 PROCEDURE — 1100000003 HC PRIVATE W/ TELEMETRY

## 2023-11-07 PROCEDURE — 85025 COMPLETE CBC W/AUTO DIFF WBC: CPT

## 2023-11-07 PROCEDURE — 82042 OTHER SOURCE ALBUMIN QUAN EA: CPT

## 2023-11-07 PROCEDURE — 87205 SMEAR GRAM STAIN: CPT

## 2023-11-07 PROCEDURE — 87070 CULTURE OTHR SPECIMN AEROBIC: CPT

## 2023-11-07 PROCEDURE — 2580000003 HC RX 258: Performed by: INTERNAL MEDICINE

## 2023-11-07 PROCEDURE — 84100 ASSAY OF PHOSPHORUS: CPT

## 2023-11-07 PROCEDURE — 80048 BASIC METABOLIC PNL TOTAL CA: CPT

## 2023-11-07 PROCEDURE — 6370000000 HC RX 637 (ALT 250 FOR IP): Performed by: INTERNAL MEDICINE

## 2023-11-07 PROCEDURE — 89050 BODY FLUID CELL COUNT: CPT

## 2023-11-07 PROCEDURE — 87040 BLOOD CULTURE FOR BACTERIA: CPT

## 2023-11-07 PROCEDURE — 84157 ASSAY OF PROTEIN OTHER: CPT

## 2023-11-07 PROCEDURE — 97116 GAIT TRAINING THERAPY: CPT

## 2023-11-07 PROCEDURE — 51798 US URINE CAPACITY MEASURE: CPT

## 2023-11-07 PROCEDURE — 83735 ASSAY OF MAGNESIUM: CPT

## 2023-11-07 PROCEDURE — 0W9G30Z DRAINAGE OF PERITONEAL CAVITY WITH DRAINAGE DEVICE, PERCUTANEOUS APPROACH: ICD-10-PCS | Performed by: RADIOLOGY

## 2023-11-07 PROCEDURE — 93306 TTE W/DOPPLER COMPLETE: CPT

## 2023-11-07 PROCEDURE — 85610 PROTHROMBIN TIME: CPT

## 2023-11-07 PROCEDURE — 88342 IMHCHEM/IMCYTCHM 1ST ANTB: CPT

## 2023-11-07 PROCEDURE — 6360000002 HC RX W HCPCS: Performed by: STUDENT IN AN ORGANIZED HEALTH CARE EDUCATION/TRAINING PROGRAM

## 2023-11-07 PROCEDURE — 2580000003 HC RX 258: Performed by: STUDENT IN AN ORGANIZED HEALTH CARE EDUCATION/TRAINING PROGRAM

## 2023-11-07 PROCEDURE — 2709999900 US GUIDED PARACENTESIS

## 2023-11-07 PROCEDURE — 88305 TISSUE EXAM BY PATHOLOGIST: CPT

## 2023-11-07 PROCEDURE — 80076 HEPATIC FUNCTION PANEL: CPT

## 2023-11-07 PROCEDURE — 88112 CYTOPATH CELL ENHANCE TECH: CPT

## 2023-11-07 PROCEDURE — 36415 COLL VENOUS BLD VENIPUNCTURE: CPT

## 2023-11-07 PROCEDURE — 88341 IMHCHEM/IMCYTCHM EA ADD ANTB: CPT

## 2023-11-07 RX ORDER — LIDOCAINE HYDROCHLORIDE 10 MG/ML
10 INJECTION, SOLUTION EPIDURAL; INFILTRATION; INTRACAUDAL; PERINEURAL ONCE
Status: DISCONTINUED | OUTPATIENT
Start: 2023-11-07 | End: 2023-11-09 | Stop reason: HOSPADM

## 2023-11-07 RX ADMIN — SODIUM CHLORIDE, PRESERVATIVE FREE 10 ML: 5 INJECTION INTRAVENOUS at 10:43

## 2023-11-07 RX ADMIN — MELATONIN 3 MG: at 22:10

## 2023-11-07 RX ADMIN — AZITHROMYCIN MONOHYDRATE 500 MG: 500 INJECTION, POWDER, LYOPHILIZED, FOR SOLUTION INTRAVENOUS at 10:51

## 2023-11-07 RX ADMIN — SODIUM BICARBONATE 1300 MG: 650 TABLET ORAL at 09:12

## 2023-11-07 RX ADMIN — SODIUM CHLORIDE, PRESERVATIVE FREE 10 ML: 5 INJECTION INTRAVENOUS at 22:10

## 2023-11-07 RX ADMIN — PANTOPRAZOLE SODIUM 40 MG: 40 TABLET, DELAYED RELEASE ORAL at 05:58

## 2023-11-07 RX ADMIN — SODIUM BICARBONATE 1300 MG: 650 TABLET ORAL at 22:10

## 2023-11-07 RX ADMIN — DILTIAZEM HYDROCHLORIDE 240 MG: 120 CAPSULE, COATED, EXTENDED RELEASE ORAL at 09:11

## 2023-11-07 RX ADMIN — CEFTRIAXONE SODIUM 2000 MG: 2 INJECTION, POWDER, FOR SOLUTION INTRAMUSCULAR; INTRAVENOUS at 09:11

## 2023-11-07 ASSESSMENT — PAIN - FUNCTIONAL ASSESSMENT: PAIN_FUNCTIONAL_ASSESSMENT: NONE - DENIES PAIN

## 2023-11-07 ASSESSMENT — PAIN SCALES - GENERAL
PAINLEVEL_OUTOF10: 0

## 2023-11-07 NOTE — PLAN OF CARE
Problem: Physical Therapy - Adult  Goal: By Discharge: Performs mobility at highest level of function for planned discharge setting. See evaluation for individualized goals. Description: FUNCTIONAL STATUS PRIOR TO ADMISSION: Patient was modified independent using a rolling walker for functional mobility. Reports difficulty on stairs. His only bathroom is on the second floor and has to navigate the stairs several times/day    HOME [address] [place] [address] Drive: The patient lived with family and required  assistance  for donning shoes and socks. Physical Therapy Goals  Initiated 11/4/2023  1. Patient will move from supine to sit and sit to supine , scoot up and down, and roll side to side in bed with modified independence within 7 day(s). 2.  Patient will transfer from bed to chair and chair to bed with modified independence using the least restrictive device within 7 day(s). 3.  Patient will perform sit to stand with modified independence within 7 day(s). 4.  Patient will ambulate with modified independence for 100 feet with the least restrictive device within 7 day(s). 5.  Patient will ascend/descend 14 stairs with 2 handrail(s) with supervision/set-up within 7 day(s). Outcome: Progressing   PHYSICAL THERAPY TREATMENT    Patient: Claritza Mora. (78 y.o. male)  Date: 11/7/2023  Diagnosis: Hyperbilirubinemia [E80.6]  Jaundice [R17]  High bilirubin [R17] Hyperbilirubinemia      Precautions:                      ASSESSMENT:  Patient continues to benefit from skilled PT services and is progressing towards goals. Lying in bed when PT arrived. Provided instruction on exercises with good tolerance and performance. Still limited by generalized weakness and decreased standing balance without support. Standing balance was good with support of RW. Has to be able to go up 14 steps to get to his 2nd floor bedroom and shower once discharged. Today gait progressed to 200 feet with sba using RW.  O2 sats 98% on RA Self ROM   Comments   Ankle Pumps  10 [x] [] [] []    Quad Sets  10 [x] [] [] []    Hamstring Sets   [] [] [] []    Gluteal Sets  10 [x] [] [] []    Short Arc Quads   [] [] [] []    Heel Slides  10 [x] [] [] []    Straight Leg Raises   [] [] [] []    Hip abd/add   [] [] [] []    Long Arc Quads   [] [] [] []    Marching   [] [] [] []       [] [] [] []        Pain Ratin/10   Pain Intervention(s):   None needed.     Activity Tolerance:   Fair , requires rest breaks, and SpO2 stable on room air    After treatment:   Patient left in no apparent distress sitting up in chair and Call bell within reach      COMMUNICATION/EDUCATION:   The patient's plan of care was discussed with: registered nurse    Patient Education  Education Given To: Patient  Education Provided: Role of Therapy;Plan of Care;Home Exercise Program;Fall Prevention Strategies;Transfer Training;Equipment  Education Provided Comments: gait training with RW  Education Method: Verbal;Demonstration  Barriers to Learning: None  Education Outcome: Continued education needed      Evy Davalos, PT  Minutes: 14

## 2023-11-07 NOTE — ADT AUTH CERT
Mendel, Jalaine Jenny, MD  Physician  Hospitalist  Progress Notes     Signed  Date of Service:  2023  4:58 PM     Signed                                                                                                                                                                          Hospitalist Progress Note     NAME:              Pio Risk. :   1950   MRN:   [id number]      Date/Time: 2023 4:58 PM  Patient PCP: Cha Seymour MD     Estimated discharge date:   Barriers: renal improvement        Assessment / Plan:     Hyperbilirubinemia  Transaminitis  Hepatitis C treated  Hepatic steatosis  Cirrhosis with complex ascites  Patient is hemodynamically stable. We will monitor in telemetry for the initial 12 to 24 hours to make sure that his hemodynamics are stable. Follow-up with repeat CMP. GI consulted, likely worsening liver function but no HE, no GIB. No need for paracentesis, asymptomatic from gallstones thus does not need Surgical consult. Followup with Dr. Noemi Hammond at discharge  - trend LFTs  - Patient has a poor prognosis and will benefit from palliative care consult to discuss goals of care. JOANIE on CKD  Baseline creatinine between 2.9-3.2. Current creatinine of 4.0 and represents acute on chronic renal failure. Gentle hydration with normal saline at 125 cc/h. Follow-up with repeat BMP. - consulted Renal, appreciate assistance     Anemia  Hemoglobin of 8.2 and likely hemoconcentrated. Monitor CBC. Peptic ulcer disease  Continue with Protonix. Hypertension  Continue with Cardizem. PT recced [facility] [facility],[address]           Medical Decision Making:   I personally reviewed labs: CBC, BMP, LFT, INR  I personally reviewed imaging: prior CT abdomen  I personally reviewed EKG:  Toxic drug monitoring: None  Discussed case with:          Code Status: Full code  DVT Prophylaxis: SCDs  Baseline:  Independent from home     Subjective:      Chief Complaint / Reason for Physician

## 2023-11-07 NOTE — PROGRESS NOTES
Bedside and Verbal shift change report given to Argentina Schwab, LPN (oncoming nurse) by Jovana Kennedy (offgoing nurse). Report included the following information Nurse Handoff Report, Intake/Output, and MAR.

## 2023-11-07 NOTE — PROGRESS NOTES
Patient arrived from room, alert & oriented X4 and on room air. Ultrasound at bedside to confirm fluid before procedure.

## 2023-11-07 NOTE — PROGRESS NOTES
GI PROGRESS NOTE  Meagan Espinosa  [phone] NP in-hospital cell phone M-F until 4:30  After 5pm or on weekends, please call  for physician on call    NAME:Chao Laird. RKJ:[birth year] RUB:[id number]   ATTG: [unfilled]   PCP: Crow Fernandez MD  Date/Time:  11/7/2023 10:22 AM     Primary GI: Dr. Liliana Lizama    Reason for following: Elevated LFTs/Cirrhosis    Assessment:   Hyperbilirubinemia  Transaminitis  Hepatitis C treated  Hepatic steatosis  Cirrhosis with complex ascites  Patient with cirrhosis secondary to chronic HCV s/p admitted with elevated t bili from baseline. Was seen during prior admission in late September and MRI confirmed Portal vein thrombosis. AST/ALT//380/362 bili t. 9.9 today   AST/ALT/ALP at baseline, T Bili baseline before this visit 4-5. HgB stable  CXR 11/5 - concern for right lower lobe pneumonia. CT 11/3  IMPRESSION:  Pulmonary arterial hypertension  Geographic hepatic steatosis, cirrhosis with new complex ascites  incidental periumbilical fat-containing hernia  Cholelithiasis and milk of calcium. Scope hx  Last EGD 9/27/23 w Dr. Liliana Lizama:  Normal proximal, mid, and distal esophagus. Notably no esophageal varices  Large, cratered ulcer along posterior-lateral wall of antrum was seen with very large adherent clot. The clot could only be partially removed and revealed underlying active oozing, Andrew classification 1b. Compete hemostasis was achieved with 'Ultra' hemostatic clip placement x 3 and bipolar probe/cautery. Heme was seen in gastric body  Stomach otherwise normal, including retroflexion. Notably no gastric varices  Downstream blood in duodenal bulb and 2nd/3rd portion of the duodenum from gastric ulcer        Plan: Will send for infectious workup to further evaluate possible etiologies for elevated bili. CXR 11/5- concern for right lower lobe pneumonia. Recommend coverage for HCAP.   Blood culture- will order  Paracentesis w/ culture - will order  UA-

## 2023-11-07 NOTE — CARE COORDINATION
Care Management Initial Assessment       RUR: 21%  Readmission? No  1st IM letter given? Yes  1st  letter given: No          11/07/23 1559   Service Assessment   Patient Orientation Alert and Oriented   Cognition Alert   History Provided By [facility]   Primary Caregiver Self   Support Systems Spouse/Significant Other;Children;Family Members;Friends/Neighbors   Patient's Healthcare Decision Maker is: Legal Next of Kin   PCP Verified by CM Yes   Last Visit to PCP Within last 3 months   Prior Functional Level Independent in ADLs/IADLs   Current Functional Level Independent in ADLs/IADLs   Can patient return to prior living arrangement Yes   Ability to make needs known: Good   Family able to assist with home care needs: Yes   Would you like for me to discuss the discharge plan with any other family members/significant others, and if so, who? Yes  (spouse)   Financial Resources Medicare   Social/Functional History   Lives With Spouse;Daughter   Type of [address] [place] [address] Two level;Bed/Bath upstairs   Home Access Stairs to enter with rails   Entrance Stairs - Number of Steps [address] [facility] Cane;Brendon Olsen Independent   Transfer Assistance Independent   Active  Yes   Occupation Retired   Discharge Planning   Type of [address] Spouse/Significant Other;Children   Current Services Prior To Admission None   [address]   DME Ordered? No   Potential Assistance Purchasing Medications No   Type of Home Care Services PT   Patient expects to be discharged to: House       Chart reviewed. Assessment completed with pt's spouse by phone as pt was THIAGO. Verified contact information and demographics. Pt lives with spouse and daughter in a two level home.  He is independent at baseline and ambulatory with a cane

## 2023-11-07 NOTE — PROGRESS NOTES
Name of Procedure: Paracentesis     Vital Signs:  stable     Fluids Removed: 1.6 L    Dressing on right abdomin clean dry and intact     Samples sent to lab: yes from orders in epic     Any complications related to procedure: none identified at this time     Patient is A&Ox4, on RA, and is in NAD at this time.

## 2023-11-08 ENCOUNTER — APPOINTMENT (OUTPATIENT)
Facility: HOSPITAL | Age: 73
End: 2023-11-08
Payer: MEDICARE

## 2023-11-08 LAB
ALBUMIN SERPL-MCNC: 2.8 G/DL (ref 3.5–5)
ALBUMIN/GLOB SERPL: 0.7 (ref 1.1–2.2)
ALP SERPL-CCNC: 367 U/L (ref 45–117)
ALT SERPL-CCNC: 388 U/L (ref 12–78)
ANION GAP SERPL CALC-SCNC: 12 MMOL/L (ref 5–15)
AST SERPL-CCNC: 453 U/L (ref 15–37)
BASOPHILS # BLD: 0 K/UL (ref 0–0.1)
BASOPHILS NFR BLD: 0 % (ref 0–1)
BILIRUB DIRECT SERPL-MCNC: 8.5 MG/DL (ref 0–0.2)
BILIRUB SERPL-MCNC: 10.3 MG/DL (ref 0.2–1)
BUN SERPL-MCNC: 56 MG/DL (ref 6–20)
BUN/CREAT SERPL: 16 (ref 12–20)
CALCIUM SERPL-MCNC: 9.2 MG/DL (ref 8.5–10.1)
CHLORIDE SERPL-SCNC: 111 MMOL/L (ref 97–108)
CO2 SERPL-SCNC: 17 MMOL/L (ref 21–32)
CREAT SERPL-MCNC: 3.46 MG/DL (ref 0.7–1.3)
DIFFERENTIAL METHOD BLD: ABNORMAL
EOSINOPHIL # BLD: 0 K/UL (ref 0–0.4)
EOSINOPHIL NFR BLD: 0 % (ref 0–7)
ERYTHROCYTE [DISTWIDTH] IN BLOOD BY AUTOMATED COUNT: 21.8 % (ref 11.5–14.5)
GLOBULIN SER CALC-MCNC: 4 G/DL (ref 2–4)
GLUCOSE SERPL-MCNC: 80 MG/DL (ref 65–100)
HCT VFR BLD AUTO: 25.4 % (ref 36.6–50.3)
HGB BLD-MCNC: 8.1 G/DL (ref 12.1–17)
IMM GRANULOCYTES # BLD AUTO: 0 K/UL (ref 0–0.04)
IMM GRANULOCYTES NFR BLD AUTO: 0 % (ref 0–0.5)
INR PPP: 1.8 (ref 0.9–1.1)
LYMPHOCYTES # BLD: 0.9 K/UL (ref 0.8–3.5)
LYMPHOCYTES NFR BLD: 12 % (ref 12–49)
MAGNESIUM SERPL-MCNC: 2.3 MG/DL (ref 1.6–2.4)
MCH RBC QN AUTO: 27.6 PG (ref 26–34)
MCHC RBC AUTO-ENTMCNC: 31.9 G/DL (ref 30–36.5)
MCV RBC AUTO: 86.4 FL (ref 80–99)
MONOCYTES # BLD: 0.5 K/UL (ref 0–1)
MONOCYTES NFR BLD: 6 % (ref 5–13)
MYELOCYTES NFR BLD MANUAL: 1 %
NEUTS SEG # BLD: 6.2 K/UL (ref 1.8–8)
NEUTS SEG NFR BLD: 81 % (ref 32–75)
NRBC # BLD: 0 K/UL (ref 0–0.01)
NRBC BLD-RTO: 0 PER 100 WBC
PHOSPHATE SERPL-MCNC: 2.9 MG/DL (ref 2.6–4.7)
PLATELET # BLD AUTO: 175 K/UL (ref 150–400)
PMV BLD AUTO: 9.8 FL (ref 8.9–12.9)
POTASSIUM SERPL-SCNC: 3.9 MMOL/L (ref 3.5–5.1)
PROT SERPL-MCNC: 6.8 G/DL (ref 6.4–8.2)
PROTHROMBIN TIME: 18.5 SEC (ref 9–11.1)
RBC # BLD AUTO: 2.94 M/UL (ref 4.1–5.7)
RBC MORPH BLD: ABNORMAL
RBC MORPH BLD: ABNORMAL
SODIUM SERPL-SCNC: 140 MMOL/L (ref 136–145)
WBC # BLD AUTO: 7.6 K/UL (ref 4.1–11.1)

## 2023-11-08 PROCEDURE — 83735 ASSAY OF MAGNESIUM: CPT

## 2023-11-08 PROCEDURE — 1100000003 HC PRIVATE W/ TELEMETRY

## 2023-11-08 PROCEDURE — 85025 COMPLETE CBC W/AUTO DIFF WBC: CPT

## 2023-11-08 PROCEDURE — 71250 CT THORAX DX C-: CPT

## 2023-11-08 PROCEDURE — 6370000000 HC RX 637 (ALT 250 FOR IP): Performed by: INTERNAL MEDICINE

## 2023-11-08 PROCEDURE — 36415 COLL VENOUS BLD VENIPUNCTURE: CPT

## 2023-11-08 PROCEDURE — 97530 THERAPEUTIC ACTIVITIES: CPT

## 2023-11-08 PROCEDURE — 6360000002 HC RX W HCPCS: Performed by: STUDENT IN AN ORGANIZED HEALTH CARE EDUCATION/TRAINING PROGRAM

## 2023-11-08 PROCEDURE — 2500000003 HC RX 250 WO HCPCS: Performed by: INTERNAL MEDICINE

## 2023-11-08 PROCEDURE — 2580000003 HC RX 258: Performed by: STUDENT IN AN ORGANIZED HEALTH CARE EDUCATION/TRAINING PROGRAM

## 2023-11-08 PROCEDURE — 80076 HEPATIC FUNCTION PANEL: CPT

## 2023-11-08 PROCEDURE — 2580000003 HC RX 258: Performed by: INTERNAL MEDICINE

## 2023-11-08 PROCEDURE — 85610 PROTHROMBIN TIME: CPT

## 2023-11-08 PROCEDURE — 80048 BASIC METABOLIC PNL TOTAL CA: CPT

## 2023-11-08 PROCEDURE — 97116 GAIT TRAINING THERAPY: CPT

## 2023-11-08 PROCEDURE — 84100 ASSAY OF PHOSPHORUS: CPT

## 2023-11-08 RX ORDER — BUMETANIDE 1 MG/1
1 TABLET ORAL 2 TIMES DAILY
Status: DISCONTINUED | OUTPATIENT
Start: 2023-11-08 | End: 2023-11-08

## 2023-11-08 RX ORDER — BUMETANIDE 1 MG/1
1 TABLET ORAL DAILY
Status: DISCONTINUED | OUTPATIENT
Start: 2023-11-08 | End: 2023-11-09

## 2023-11-08 RX ADMIN — MELATONIN 3 MG: at 21:53

## 2023-11-08 RX ADMIN — SODIUM BICARBONATE 1300 MG: 650 TABLET ORAL at 09:56

## 2023-11-08 RX ADMIN — SODIUM BICARBONATE 50 MEQ: 84 INJECTION, SOLUTION INTRAVENOUS at 10:16

## 2023-11-08 RX ADMIN — BUMETANIDE 1 MG: 1 TABLET ORAL at 12:05

## 2023-11-08 RX ADMIN — SODIUM CHLORIDE, PRESERVATIVE FREE 10 ML: 5 INJECTION INTRAVENOUS at 09:56

## 2023-11-08 RX ADMIN — SODIUM BICARBONATE 1300 MG: 650 TABLET ORAL at 21:53

## 2023-11-08 RX ADMIN — CEFTRIAXONE SODIUM 2000 MG: 2 INJECTION, POWDER, FOR SOLUTION INTRAMUSCULAR; INTRAVENOUS at 09:56

## 2023-11-08 RX ADMIN — PANTOPRAZOLE SODIUM 40 MG: 40 TABLET, DELAYED RELEASE ORAL at 06:39

## 2023-11-08 RX ADMIN — PANTOPRAZOLE SODIUM 40 MG: 40 TABLET, DELAYED RELEASE ORAL at 17:26

## 2023-11-08 RX ADMIN — DILTIAZEM HYDROCHLORIDE 240 MG: 120 CAPSULE, COATED, EXTENDED RELEASE ORAL at 09:56

## 2023-11-08 RX ADMIN — SODIUM CHLORIDE, PRESERVATIVE FREE 10 ML: 5 INJECTION INTRAVENOUS at 21:53

## 2023-11-08 NOTE — PLAN OF CARE
Problem: Physical Therapy - Adult  Goal: By Discharge: Performs mobility at highest level of function for planned discharge setting. See evaluation for individualized goals. Description: FUNCTIONAL STATUS PRIOR TO ADMISSION: Patient was modified independent using a rolling walker for functional mobility. Reports difficulty on stairs. His only bathroom is on the second floor and has to navigate the stairs several times/day    HOME [address] [place] [address] Drive: The patient lived with family and required  assistance  for donning shoes and socks. Physical Therapy Goals  Initiated 11/4/2023  1. Patient will move from supine to sit and sit to supine , scoot up and down, and roll side to side in bed with modified independence within 7 day(s). 2.  Patient will transfer from bed to chair and chair to bed with modified independence using the least restrictive device within 7 day(s). 3.  Patient will perform sit to stand with modified independence within 7 day(s). 4.  Patient will ambulate with modified independence for 100 feet with the least restrictive device within 7 day(s). 5.  Patient will ascend/descend 14 stairs with 2 handrail(s) with supervision/set-up within 7 day(s). Outcome: Progressing   PHYSICAL THERAPY TREATMENT    Patient: Anders Meraz (78 y.o. male)  Date: 11/8/2023  Diagnosis: Hyperbilirubinemia [E80.6]  Jaundice [R17]  High bilirubin [R17] Hyperbilirubinemia      Precautions:  Standard                    ASSESSMENT:  Patient continues to benefit from skilled PT services and is progressing towards goals. Patient in chair on arrival reporting he has been up all morning and requesting to return to bed. He tolerated in room ambulation and commode transfers well. Declined additional ambulation or stair training this visit reporting too fatigued. He reports not being concerned about his stairs at home despite the need to navigate 14 steps once home.  Will attempt to assess stair safety next DPT  Minutes: 25

## 2023-11-08 NOTE — PLAN OF CARE
Problem: Discharge Planning  Goal: Discharge to home or other facility with appropriate resources  Outcome: Progressing     Problem: Pain  Goal: Verbalizes/displays adequate comfort level or baseline comfort level  Outcome: Progressing     Problem: Skin/Tissue Integrity  Goal: Absence of new skin breakdown  Description: 1. Monitor for areas of redness and/or skin breakdown  2. Assess vascular access sites hourly  3. Every 4-6 hours minimum:  Change oxygen saturation probe site  4. Every 4-6 hours:  If on nasal continuous positive airway pressure, respiratory therapy assess nares and determine need for appliance change or resting period. Outcome: Progressing     Problem: Safety - Adult  Goal: Free from fall injury  Outcome: Progressing     Problem: Physical Therapy - Adult  Goal: By Discharge: Performs mobility at highest level of function for planned discharge setting. See evaluation for individualized goals. Description: FUNCTIONAL STATUS PRIOR TO ADMISSION: Patient was modified independent using a rolling walker for functional mobility. Reports difficulty on stairs. His only bathroom is on the second floor and has to navigate the stairs several times/day    HOME [address] [place] [address] Drive: The patient lived with family and required  assistance  for donning shoes and socks. Physical Therapy Goals  Initiated 11/4/2023  1. Patient will move from supine to sit and sit to supine , scoot up and down, and roll side to side in bed with modified independence within 7 day(s). 2.  Patient will transfer from bed to chair and chair to bed with modified independence using the least restrictive device within 7 day(s). 3.  Patient will perform sit to stand with modified independence within 7 day(s). 4.  Patient will ambulate with modified independence for 100 feet with the least restrictive device within 7 day(s).    5.  Patient will ascend/descend 14 stairs with 2 handrail(s) with supervision/set-up within 7

## 2023-11-08 NOTE — PROGRESS NOTES
Bedside and Verbal shift change report given to Francesco Khalil RN (oncoming nurse) by Luisito Knox (offgoing nurse). Report included the following information Nurse Handoff Report, Intake/Output, MAR, Recent Results, and Cardiac Rhythm NSR .

## 2023-11-08 NOTE — PROGRESS NOTES
Hospitalist Progress Note    NAME:   Selam Geiger : 1950   MRN: [id number]     Date/Time: 2023 6:17 PM  Patient PCP: Trduy Narvaez MD    Estimated discharge date: 11/10  Barriers: CT chest, bili improvement, blood culture clearance      Assessment / Plan:    Hyperbilirubinemia  Transaminitis  Hepatitis C treated  Hepatic steatosis  Cirrhosis with complex ascites  Previous portal vein thrombosis with cavernous transformation  Patient is hemodynamically stable. We will monitor in telemetry for the initial 12 to 24 hours to make sure that his hemodynamics are stable. Follow-up with repeat CMP. GI consulted, likely worsening liver function but no HE, no GIB. No need for paracentesis, asymptomatic from gallstones thus does not need Surgical consult  - trend LFTs, remain elevated  - Patient has a poor prognosis and may benefit from palliative care consult to discuss goals of care. - repeat echo with normal EF and RV function  - per last admission, did not require anticoagulation for portal vein thrombosis as it is chronic  - discussed with GI , repeat paracentesis  - followup with Dr. Matti Mccabe at discharge  - discussed with GI , bili increase with stable LFTs likely secondary to infection. No SBP. Does have possible evidence of infection on CXR, suggested CT chest to better characterize this. Discussed that bili should decrease if infection is the cause and is adequately treated  - CT chest ordered, renewed abx  - monitor for bili improvement  - blood culture neg thus far     JOANIE on CKD  Baseline creatinine between 2.9-3.2. Gentle hydration with normal saline at 125 cc/h. Follow-up with repeat BMP. - consulted Renal, appreciate assistance, Cr has improved    CAP  - added abx for 5 days, clinically mild, afebrile, WBC normal  - CT chest discussed above     Anemia  Monitor CBC. Peptic ulcer disease  Continue with Protonix. Hypertension  Continue with Cardizem.     PT recced Comments   >50% of visit spent in counseling and coordination of care     ________________________________________________________________________      Procedures: see electronic medical records for all procedures/Xrays and details which were not copied into this note but were reviewed prior to creation of Plan. LABS:  I reviewed today's most current labs and imaging studies.   Pertinent labs include:  Recent Labs     11/06/23  0500 11/07/23  0441 11/08/23  0506   WBC 7.0 7.2 7.6   HGB 8.9* 8.4* 8.1*   HCT 27.9* 25.4* 25.4*    179 175       Recent Labs     11/06/23  0500 11/07/23  0441 11/08/23  0506    141 140   K 3.8 3.9 3.9   * 110* 111*   CO2 16* 17* 17*   GLUCOSE 90 73 80   BUN 53* 59* 56*   CREATININE 3.23* 3.28* 3.46*   CALCIUM 9.3 9.2 9.2   MG 2.1 2.2 2.3   PHOS 2.9 3.3 2.9   LABALBU 3.0* 2.9* 2.8*   BILITOT 9.8* 9.9* 10.3*   * 456* 453*   * 380* 388*   INR 1.8* 1.8* 1.8*         Signed: Mireya Cooley MD

## 2023-11-08 NOTE — PROGRESS NOTES
GI PROGRESS NOTE  [facility], [place]  [phone] NP in-hospital cell phone M-F until 4:30  After 5pm or on weekends, please call  for physician on call    NAME:Chao Junior. ZHV:[id number] [facility]:[id number]   ATTG: Dr. Jackie Gannon  PCP: Moon Vick MD  Date/Time:  11/8/2023 3:59 PM   Primary GI: Dr. Slim Pedroza  Reason for following: Elevated LFTs/Cirrhosis  Assessment:   Hyperbilirubinemia  Transaminitis  Hepatitis C treated  Hepatic steatosis  Cirrhosis with complex ascites  Patient with cirrhosis secondary to chronic HCV s/p admitted with elevated t bili from baseline. Was seen during prior admission in late September and MRI confirmed Portal vein thrombosis. LFTs remain elevated as well as Tbili at 10.3 today   AST/ALT/ALP at baseline, T Bili baseline before this visit 4-5. HgB stable  CXR 11/5 - concern for right lower lobe pneumonia. - particularly HAP pneumonia given recent admission  CT 11/3  IMPRESSION:  Pulmonary arterial hypertension  Geographic hepatic steatosis, cirrhosis with new complex ascites  incidental periumbilical fat-containing hernia  Cholelithiasis and milk of calcium. S/p paracentesis with 1.6L removed on 11/7 - PMN noted to be 24.8 - no evidence of SBP    Scope hx  Last EGD 9/27/23 w Dr. Slim Pedroza:  Normal proximal, mid, and distal esophagus. Notably no esophageal varices  Large, cratered ulcer along posterior-lateral wall of antrum was seen with very large adherent clot. The clot could only be partially removed and revealed underlying active oozing, Andrew classification 1b. Compete hemostasis was achieved with 'Ultra' hemostatic clip placement x 3 and bipolar probe/cautery. Heme was seen in gastric body  Stomach otherwise normal, including retroflexion.  Notably no gastric varices  Downstream blood in duodenal bulb and 2nd/3rd portion of the duodenum from gastric ulcer        Plan:   Would recommend broad spectrum abx coverage given recent admission and concern for HAP   Other Nightly    aluminum & magnesium hydroxide-simethicone (MAALOX) 200-200-20 MG/5ML suspension 30 mL  30 mL Oral Q6H PRN    dilTIAZem (CARDIZEM CD) extended release capsule 240 mg  240 mg Oral Daily    pantoprazole (PROTONIX) tablet 40 mg  40 mg Oral BID AC

## 2023-11-08 NOTE — PLAN OF CARE
Problem: Discharge Planning  Goal: Discharge to home or other facility with appropriate resources  11/8/2023 0955 by Bridger Issa RN  Outcome: Progressing  11/8/2023 0056 by Sudarshan Leon RN  Outcome: Progressing     Problem: Pain  Goal: Verbalizes/displays adequate comfort level or baseline comfort level  11/8/2023 0955 by Bridger Issa RN  Outcome: Progressing  11/8/2023 0056 by Sudarshan Leon RN  Outcome: Progressing     Problem: Skin/Tissue Integrity  Goal: Absence of new skin breakdown  Description: 1. Monitor for areas of redness and/or skin breakdown  2. Assess vascular access sites hourly  3. Every 4-6 hours minimum:  Change oxygen saturation probe site  4. Every 4-6 hours:  If on nasal continuous positive airway pressure, respiratory therapy assess nares and determine need for appliance change or resting period.   11/8/2023 0955 by Bridger Issa RN  Outcome: Progressing  11/8/2023 0056 by Sudarshan Leon RN  Outcome: Progressing     Problem: Safety - Adult  Goal: Free from fall injury  11/8/2023 0955 by Bridger Issa RN  Outcome: Progressing  11/8/2023 0056 by Sudarshan Leon RN  Outcome: Progressing

## 2023-11-09 VITALS
HEART RATE: 80 BPM | DIASTOLIC BLOOD PRESSURE: 51 MMHG | SYSTOLIC BLOOD PRESSURE: 106 MMHG | BODY MASS INDEX: 30.79 KG/M2 | TEMPERATURE: 97.9 F | HEIGHT: 72 IN | RESPIRATION RATE: 20 BRPM | OXYGEN SATURATION: 90 % | WEIGHT: 227.29 LBS

## 2023-11-09 LAB
ALBUMIN SERPL-MCNC: 2.7 G/DL (ref 3.5–5)
ALBUMIN/GLOB SERPL: 0.7 (ref 1.1–2.2)
ALP SERPL-CCNC: 366 U/L (ref 45–117)
ALT SERPL-CCNC: 404 U/L (ref 12–78)
ANION GAP SERPL CALC-SCNC: 10 MMOL/L (ref 5–15)
AST SERPL-CCNC: 450 U/L (ref 15–37)
BASOPHILS # BLD: 0 K/UL (ref 0–0.1)
BASOPHILS NFR BLD: 0 % (ref 0–1)
BILIRUB DIRECT SERPL-MCNC: 8.8 MG/DL (ref 0–0.2)
BILIRUB SERPL-MCNC: 10.5 MG/DL (ref 0.2–1)
BUN SERPL-MCNC: 62 MG/DL (ref 6–20)
BUN/CREAT SERPL: 16 (ref 12–20)
CALCIUM SERPL-MCNC: 9 MG/DL (ref 8.5–10.1)
CHLORIDE SERPL-SCNC: 110 MMOL/L (ref 97–108)
CO2 SERPL-SCNC: 21 MMOL/L (ref 21–32)
CREAT SERPL-MCNC: 3.88 MG/DL (ref 0.7–1.3)
DIFFERENTIAL METHOD BLD: ABNORMAL
EOSINOPHIL # BLD: 0 K/UL (ref 0–0.4)
EOSINOPHIL NFR BLD: 0 % (ref 0–7)
ERYTHROCYTE [DISTWIDTH] IN BLOOD BY AUTOMATED COUNT: 21.2 % (ref 11.5–14.5)
GLOBULIN SER CALC-MCNC: 3.9 G/DL (ref 2–4)
GLUCOSE SERPL-MCNC: 108 MG/DL (ref 65–100)
HCT VFR BLD AUTO: 24.6 % (ref 36.6–50.3)
HGB BLD-MCNC: 8.1 G/DL (ref 12.1–17)
IMM GRANULOCYTES # BLD AUTO: 0.2 K/UL (ref 0–0.04)
IMM GRANULOCYTES NFR BLD AUTO: 2 % (ref 0–0.5)
INR PPP: 1.8 (ref 0.9–1.1)
LYMPHOCYTES # BLD: 0.9 K/UL (ref 0.8–3.5)
LYMPHOCYTES NFR BLD: 10 % (ref 12–49)
MAGNESIUM SERPL-MCNC: 2.1 MG/DL (ref 1.6–2.4)
MCH RBC QN AUTO: 27.5 PG (ref 26–34)
MCHC RBC AUTO-ENTMCNC: 32.9 G/DL (ref 30–36.5)
MCV RBC AUTO: 83.4 FL (ref 80–99)
MONOCYTES # BLD: 0.8 K/UL (ref 0–1)
MONOCYTES NFR BLD: 9 % (ref 5–13)
NEUTS SEG # BLD: 6.7 K/UL (ref 1.8–8)
NEUTS SEG NFR BLD: 79 % (ref 32–75)
NRBC # BLD: 0 K/UL (ref 0–0.01)
NRBC BLD-RTO: 0 PER 100 WBC
PHOSPHATE SERPL-MCNC: 2.9 MG/DL (ref 2.6–4.7)
PLATELET # BLD AUTO: 180 K/UL (ref 150–400)
PMV BLD AUTO: 10.3 FL (ref 8.9–12.9)
POTASSIUM SERPL-SCNC: 3.8 MMOL/L (ref 3.5–5.1)
PROT SERPL-MCNC: 6.6 G/DL (ref 6.4–8.2)
PROTHROMBIN TIME: 17.7 SEC (ref 9–11.1)
RBC # BLD AUTO: 2.95 M/UL (ref 4.1–5.7)
RBC MORPH BLD: ABNORMAL
SODIUM SERPL-SCNC: 141 MMOL/L (ref 136–145)
WBC # BLD AUTO: 8.6 K/UL (ref 4.1–11.1)

## 2023-11-09 PROCEDURE — 36415 COLL VENOUS BLD VENIPUNCTURE: CPT

## 2023-11-09 PROCEDURE — 6360000002 HC RX W HCPCS: Performed by: STUDENT IN AN ORGANIZED HEALTH CARE EDUCATION/TRAINING PROGRAM

## 2023-11-09 PROCEDURE — 6370000000 HC RX 637 (ALT 250 FOR IP): Performed by: INTERNAL MEDICINE

## 2023-11-09 PROCEDURE — 83735 ASSAY OF MAGNESIUM: CPT

## 2023-11-09 PROCEDURE — 85610 PROTHROMBIN TIME: CPT

## 2023-11-09 PROCEDURE — 85025 COMPLETE CBC W/AUTO DIFF WBC: CPT

## 2023-11-09 PROCEDURE — 80076 HEPATIC FUNCTION PANEL: CPT

## 2023-11-09 PROCEDURE — 2580000003 HC RX 258: Performed by: INTERNAL MEDICINE

## 2023-11-09 PROCEDURE — 80048 BASIC METABOLIC PNL TOTAL CA: CPT

## 2023-11-09 PROCEDURE — 2580000003 HC RX 258: Performed by: STUDENT IN AN ORGANIZED HEALTH CARE EDUCATION/TRAINING PROGRAM

## 2023-11-09 PROCEDURE — 84100 ASSAY OF PHOSPHORUS: CPT

## 2023-11-09 PROCEDURE — 97535 SELF CARE MNGMENT TRAINING: CPT

## 2023-11-09 RX ORDER — AMOXICILLIN AND CLAVULANATE POTASSIUM 875; 125 MG/1; MG/1
1 TABLET, FILM COATED ORAL 2 TIMES DAILY
Qty: 10 TABLET | Refills: 0 | Status: SHIPPED | OUTPATIENT
Start: 2023-11-09 | End: 2023-11-14

## 2023-11-09 RX ORDER — SODIUM BICARBONATE 650 MG/1
650 TABLET ORAL 3 TIMES DAILY
Qty: 90 TABLET | Refills: 1 | Status: SHIPPED | OUTPATIENT
Start: 2023-11-09

## 2023-11-09 RX ADMIN — CEFTRIAXONE SODIUM 2000 MG: 2 INJECTION, POWDER, FOR SOLUTION INTRAMUSCULAR; INTRAVENOUS at 09:45

## 2023-11-09 RX ADMIN — DILTIAZEM HYDROCHLORIDE 240 MG: 120 CAPSULE, COATED, EXTENDED RELEASE ORAL at 09:28

## 2023-11-09 RX ADMIN — SODIUM CHLORIDE, PRESERVATIVE FREE 10 ML: 5 INJECTION INTRAVENOUS at 09:31

## 2023-11-09 RX ADMIN — PANTOPRAZOLE SODIUM 40 MG: 40 TABLET, DELAYED RELEASE ORAL at 05:18

## 2023-11-09 RX ADMIN — SODIUM BICARBONATE 1300 MG: 650 TABLET ORAL at 09:29

## 2023-11-09 ASSESSMENT — PAIN SCALES - GENERAL: PAINLEVEL_OUTOF10: 0

## 2023-11-09 NOTE — PLAN OF CARE
Problem: Discharge Planning  Goal: Discharge to home or other facility with appropriate resources  11/9/2023 1230 by Umair Martins RN  Outcome: Adequate for Discharge  11/9/2023 1040 by Umair Martins RN  Outcome: Progressing  11/8/2023 2351 by Jasper Faye RN  Outcome: Progressing     Problem: Pain  Goal: Verbalizes/displays adequate comfort level or baseline comfort level  11/9/2023 1230 by Umair Martins RN  Outcome: Adequate for Discharge  11/9/2023 1040 by Umair Martins RN  Outcome: Progressing  11/8/2023 2351 by Jasper Faye RN  Outcome: Progressing     Problem: Skin/Tissue Integrity  Goal: Absence of new skin breakdown  Description: 1. Monitor for areas of redness and/or skin breakdown  2. Assess vascular access sites hourly  3. Every 4-6 hours minimum:  Change oxygen saturation probe site  4. Every 4-6 hours:  If on nasal continuous positive airway pressure, respiratory therapy assess nares and determine need for appliance change or resting period.   11/9/2023 1230 by Umair Martins RN  Outcome: Adequate for Discharge  11/9/2023 1040 by Umair Martins RN  Outcome: Progressing  11/8/2023 2351 by Jasper Faye RN  Outcome: Progressing     Problem: Safety - Adult  Goal: Free from fall injury  11/9/2023 1230 by Umair Martins RN  Outcome: Adequate for Discharge  11/9/2023 1040 by Umair Martins RN  Outcome: Progressing  11/8/2023 2351 by Jasper Faye RN  Outcome: Progressing

## 2023-11-09 NOTE — PLAN OF CARE
Problem: Discharge Planning  Goal: Discharge to home or other facility with appropriate resources  11/8/2023 2351 by Leonid Lai RN  Outcome: Progressing  11/8/2023 0955 by Jan Deng RN  Outcome: Progressing     Problem: Pain  Goal: Verbalizes/displays adequate comfort level or baseline comfort level  11/8/2023 2351 by Leonid Lai RN  Outcome: Progressing  11/8/2023 0955 by Jan Deng RN  Outcome: Progressing     Problem: Skin/Tissue Integrity  Goal: Absence of new skin breakdown  Description: 1. Monitor for areas of redness and/or skin breakdown  2. Assess vascular access sites hourly  3. Every 4-6 hours minimum:  Change oxygen saturation probe site  4. Every 4-6 hours:  If on nasal continuous positive airway pressure, respiratory therapy assess nares and determine need for appliance change or resting period. 11/8/2023 2351 by Leonid Lai RN  Outcome: Progressing  11/8/2023 0955 by Jan Deng RN  Outcome: Progressing     Problem: Safety - Adult  Goal: Free from fall injury  11/8/2023 2351 by Leonid Lai RN  Outcome: Progressing  11/8/2023 0955 by Jan Deng RN  Outcome: Progressing     Problem: Physical Therapy - Adult  Goal: By Discharge: Performs mobility at highest level of function for planned discharge setting. See evaluation for individualized goals. Description: FUNCTIONAL STATUS PRIOR TO ADMISSION: Patient was modified independent using a rolling walker for functional mobility. Reports difficulty on stairs. His only bathroom is on the second floor and has to navigate the stairs several times/day    HOME [address] [place] [address] Drive: The patient lived with family and required  assistance  for donning shoes and socks. Physical Therapy Goals  Initiated 11/4/2023  1. Patient will move from supine to sit and sit to supine , scoot up and down, and roll side to side in bed with modified independence within 7 day(s).     2.  Patient will transfer from bed to chair and chair to bed with modified independence using the least restrictive device within 7 day(s). 3.  Patient will perform sit to stand with modified independence within 7 day(s). 4.  Patient will ambulate with modified independence for 100 feet with the least restrictive device within 7 day(s). 5.  Patient will ascend/descend 14 stairs with 2 handrail(s) with supervision/set-up within 7 day(s).    11/8/2023 1332 by Teresa Graff., PT  Outcome: Progressing

## 2023-11-09 NOTE — DISCHARGE INSTRUCTIONS
HOSPITALIST DISCHARGE INSTRUCTIONS    NAME: Guillermo Peck :  1950   MRN:  [id number]     Date/Time:  2023 11:33 AM    ADMIT DATE: 11/3/2023     DISCHARGE DATE: 2023     DISCHARGE DIAGNOSIS:  Hyperbilirubinemia  Transaminitis  Hepatitis C treated  Hepatic steatosis  Cirrhosis with complex ascites  Previous portal vein thrombosis with cavernous transformation  JOANIE on CKD3  Community acquired pneumonia    Follow up with nephrology as outpatient    MEDICATIONS:  As per medication reconciliation  list  It is important that you take the medication exactly as they are prescribed. Keep your medication in the bottles provided by the pharmacist and keep a list of the medication names, dosages, and times to be taken in your wallet. Do not take other medications without consulting your doctor. Pain Management: per above medications    What to do at Home    Recommended diet:  cardiac diet    Recommended activity: activity as tolerated    If you have questions regarding the hospital related prescriptions or hospital related issues please call at [phone]. If you experience any of the following symptoms then please call your primary care physician or return to the emergency room if you cannot get hold of your doctor:  Fever, chills, nausea, vomiting, diarrhea, change in mentation, falling, bleeding, shortness of breath    Follow Up:  PMD: you are to call and set up an appointment to see them in 7-10 days. Information obtained by :  I understand that if any problems occur once I am at home I am to contact my physician. I understand and acknowledge receipt of the instructions indicated above.                                                                                                                                            Physician's or R.N.'s Signature                                                                  Date/Time Patient or Representative Signature                                                          Date/Time

## 2023-11-09 NOTE — CARE COORDINATION
Transition of Care Plan:    RUR: 22%  Prior Level of Functioning: independent   Disposition: home with family, OP PT   Follow up appointments: PCP, specialists as indicated   DME needed: N/A  Transportation at discharge: spouse, ETA 2PM  IM/IMM Medicare/ letter given: given  Is patient a  and connected with VA? no   If yes, was [place] transfer form completed and VA notified? Caregiver Contact: spouse   Discharge Caregiver contacted prior to discharge? yes  Care Conference needed? no  Barriers to discharge:  none       11/09/23 901 [facility] Discharge   Transition of Care Consult (CM Consult) Discharge [facility] Discharge PT;Outpatient   [facility] Information Provided? No   Mode of Transport at Discharge Other (see comment)  (family)   Hospital Transport Time of Discharge 1400   Confirm Follow Up Transport Family   Condition of Participation: Discharge Planning   The Plan for Transition of Care is related to the following treatment goals: prefers OP PT- already has prescription from PCP   The Patient and/or Patient Representative was provided with a Choice of Provider? Patient;Patient Representative   Name of the Patient Representative who was provided with the Choice of Provider and agrees with the Discharge Plan? spouse   The Patient and/Or Patient Representative agree with the Discharge Plan? Yes   Freedom of Choice list was provided with basic dialogue that supports the patient's individualized plan of care/goals, treatment preferences, and shares the quality data associated with the providers? Yes       Chart reviewed. CM aware of discharge order. Met with pt at bedside to finalize and review d/c plan. Later spoke with spouse via phone- spouse will transport home at [facility] today. Pt/family have opted for pt to do OP PT at d/c rather than home health as he [facility]. No further CM needs identified. 2nd IM given.  Ready for d/c from CM standpoint. Informed assigned RN.     Huseyin Bauer MSW   Care Manager, [address] [place] [address]

## 2023-11-09 NOTE — DISCHARGE SUMMARY
Discharge Summary    Name: Bam Arriaga  [id number]  YOB: 1950 (Age: 68 y.o.)   Date of Admission: 11/3/2023  Date of Discharge: 11/9/2023  Attending Physician: Jer Yanez MD    Discharge Diagnosis:   Hyperbilirubinemia  History of hepatitis C treated  Hepatic steatosis  Cirrhosis with complex ascites  Chronic portal vein thrombosis  JOANIE on CKD  Community-acquired pneumonia  Anemia  Peptic ulcer disease  Hypertension    Consultations:  IP CONSULT TO HOSPITALIST  IP CONSULT TO GI  IP CONSULT TO NEPHROLOGY      Brief Admission History/Reason for Admission Per Rajesh Basurto MD:   Lakesha Madrid is a 68 y.o.  male with PMHx significant for History of IV drug use, hepatitis C treated long time back, chronic transaminitis and elevated total bilirubin of two 5.0, CKD 4 with baseline creatinine between 2.9-3.2, chronic anemia with baseline hemoglobin between 7.1-7.8, HTN, HLD and former smoker. He was recently admitted for RUQ pain and was found to have peptic ulcer disease and portal vein thrombosis. Patient is a poor historian and hence history is limited and is based on conversation with ED physician and review of past records. Yesterday patient had outpatient lab work that showed elevated total bilirubin of 11 and. He was asked to go to the nearest ED for further evaluation and treatment. Patient denies any abdominal pain, nausea or vomiting. No recent fever or chills. No chest pain, dyspnea, palpitation or syncope. No change in his bowel or bladder habits. He reports being jaundiced for 2 to 3 days. Denies any pruritus. He denies abusing alcohol. In the ED, patient had stable vital signs. CT abdomen pelvis showed pulmonary artery hypertension, geographic hepatic steatosis, cirrhosis with new complex ascites. Incidental periumbilical fat-containing hernia. Cholelithiasis and milk of calcium.      Lab work shows JOANIE on CKD with a BUN/creatinine of 57/4.0. Previous creatinine of 2.9. Metabolic acidosis with bicarbonate of 18. Serum ammonia negative. Transaminitis with , ALT of 352, alk phos of 409 and total bilirubin of 9.8. Lipase 119. INR of 1.8. Rest of the CBC, BMP and LFT within normal limits. Urinalysis shows a cloudy urine with trace blood, 100 protein, trace leukocyte esterase. Patient was treated as progressively worsening hyperbilirubinemia, JOANIE on CKD. Admitted to hospitalist service for further management. We were asked to admit for work up and evaluation of the above problems. Brief Hospital Course by Main Problems:   Hyperbilirubinemia  Transaminitis  Hepatitis C treated  Hepatic steatosis  Cirrhosis with complex ascites  Previous portal vein thrombosis with cavernous transformation    Liver enzymes remains elevated  - repeat echo with normal EF and RV function  - per last admission, did not require anticoagulation for portal vein thrombosis as it is chronic  - followup with Dr. Robert Tran at discharge  - discussed with GI 11/8, bili increase with stable LFTs likely secondary to infection. No SBP. Does have possible evidence of infection on CXR, CT chest showed small left pleural effusion likely atelectasis, subtle bilateral upper lobe groundglass opacities. Discharged home Augmentin for 5 more days  Blood cultures negative so far  Remains on room air  Discharge home today, will need outpatient follow-up with Dr. Robert Tran. Prognosis remains poor     JOANIE on CKD  Baseline creatinine between 2.9-3.2. Follow-up with repeat BMP. Slight bump in creatinine today after Bumex,  Discussed with nephrology today, recommended holding diuretics and okay for discharge     CAP  - added abx for 5 days, clinically mild, afebrile, WBC normal  - CT chest discussed above     Anemia  Monitor CBC. Peptic ulcer disease  Continue with Protonix. Hypertension  Continue with Cardizem.      [facility]    Discharge Family:       Home with HH/PT/OT/RN: x   SNF/LTC:    OLIVIA:    OTHER:            Code status:   Recommended diet: cardiac diet  Recommended activity: activity as tolerated  Wound care: None      Follow up with:   PCP : Jordy Culver MD  No follow-up provider specified.         Total time in minutes spent coordinating this discharge (includes going over instructions, follow-up, prescriptions, and preparing report for sign off to her PCP) :  35 minutes

## 2023-11-09 NOTE — PLAN OF CARE
Problem: Discharge Planning  Goal: Discharge to home or other facility with appropriate resources  11/9/2023 1040 by Ryder Brito RN  Outcome: Progressing  11/8/2023 2351 by Manolo Ellis RN  Outcome: Progressing     Problem: Pain  Goal: Verbalizes/displays adequate comfort level or baseline comfort level  11/9/2023 1040 by Ryder Brito RN  Outcome: Progressing  11/8/2023 2351 by Manolo Ellis RN  Outcome: Progressing     Problem: Skin/Tissue Integrity  Goal: Absence of new skin breakdown  Description: 1. Monitor for areas of redness and/or skin breakdown  2. Assess vascular access sites hourly  3. Every 4-6 hours minimum:  Change oxygen saturation probe site  4. Every 4-6 hours:  If on nasal continuous positive airway pressure, respiratory therapy assess nares and determine need for appliance change or resting period.   11/9/2023 1040 by Ryder Brito RN  Outcome: Progressing  11/8/2023 2351 by Manolo Ellis RN  Outcome: Progressing     Problem: Safety - Adult  Goal: Free from fall injury  11/9/2023 1040 by Ryder Brito RN  Outcome: Progressing  11/8/2023 2351 by Manolo Ellis RN  Outcome: Progressing

## 2023-11-09 NOTE — PROGRESS NOTES
Pt discharged. IV access and tele box removed. Instructions provided to patient with verbal understanding. I also called the wife (duyen Kebede) and updated her on the instructions. Pt left via w/c with all his belongings to go home.

## 2023-11-09 NOTE — PROGRESS NOTES
Spiritual Care Assessment/Progress Note  [place]    Name: Caroline Avalos MRN: [id number]    Age: 68 y.o. Sex: male   Language: English     Date: 11/9/2023            Total Time Calculated: 11 min              Spiritual Assessment begun in [facility] 3 MED TELE  Service Provided For[de-identified] Patient  Referral/Consult From[de-identified] Rounding  Encounter Overview/Reason : Attempted Encounter    Spiritual beliefs:      [] Involved in a estela tradition/spiritual practice:      [] Supported by a estela community:      [] Claims no spiritual orientation:      [] Seeking spiritual identity:           [] Adheres to an individual form of spirituality:      [x] Not able to assess:                Identified resources for coping and support system:   Support System: Unknown       [] Prayer                  [] Devotional reading               [] Music                  [] Guided Imagery     [] Pet visits                                        [] Other: (COMMENT)     Specific area/focus of visit   Encounter:    Crisis:    Spiritual/Emotional needs:    Ritual, Rites and Sacraments:    Grief, Loss, and Adjustments:    Ethics/Mediation:    Behavioral Health:    Palliative Care: Advance Care Planning:      Plan/Referrals: Continue to visit, (comment)    Narrative:  reviewed the patient's chart prior to the visit. Mr. Ivonne Almanzar was asleep when the  entered his room.  services are available 24 hours a day as requested. Rev. RUTH Quiros  [address] [place] [address] E   Paging Service [phone]

## 2023-11-09 NOTE — PROGRESS NOTES
Bedside shift change report given to [employer] RN (oncoming nurse) by Mortimer Spar RN (offgoing nurse). Report included the following information Nurse Handoff Report, Index, ED Encounter Summary, ED SBAR, Adult Overview, Surgery Report, Intake/Output, MAR, Recent Results, Med Rec Status, and Cardiac Rhythm (NSR) . Pt had a calm night, no complaints. Pt at bedside shift report was calm and awake in bed.

## 2023-11-10 ENCOUNTER — CLINICAL DOCUMENTATION (OUTPATIENT)
Age: 73
End: 2023-11-10

## 2023-11-10 NOTE — CARE COORDINATION
Pt discharged on 11/9. Received incoming call this AM from pt's spouse, Erin Ralph. Per Megan, she is concerned that pt is not as mobile as he was prior to admission. Pt/family had declined [address] services at d/, stating he had just finished up services and they wanted to start OP PT instead. Megan inquired about rehab placement- pt has [facility]. Unable to pursue placement at this time as pt has been discharged from the hospital. PT/OT also recommended [address] services at d/- it is unlikely that insurance would approve SNF unless pt continues to decline. Offered to arrange [address] services- spouse still prefers OP PT which he will begin next week. Encouraged spouse to keep PCP office informed of any changes. She is calling today to get pt a follow up appointment. CM will alert CMA for assistance on getting an appointment. Will alert ED CM as pt may be high risk for readmission if further declines at home.     Don Cox, MSW   Care Manager, [address] [place] [address]

## 2023-11-11 LAB
BACTERIA SPEC CULT: ABNORMAL
BACTERIA SPEC CULT: ABNORMAL
GRAM STN SPEC: ABNORMAL
GRAM STN SPEC: ABNORMAL
SERVICE CMNT-IMP: ABNORMAL

## 2023-11-12 LAB
BACTERIA SPEC CULT: NORMAL
BACTERIA SPEC CULT: NORMAL
SERVICE CMNT-IMP: NORMAL
SERVICE CMNT-IMP: NORMAL

## 2023-11-13 PROBLEM — K72.90 LIVER FAILURE WITHOUT HEPATIC COMA, UNSPECIFIED CHRONICITY (HCC): Status: ACTIVE | Noted: 2023-01-01

## 2023-11-14 NOTE — PLAN OF CARE
Problem: Physical Therapy - Adult  Goal: By Discharge: Performs mobility at highest level of function for planned discharge setting. See evaluation for individualized goals. Description: FUNCTIONAL STATUS PRIOR TO ADMISSION: Patient was modified independent using a rolling walker for functional mobility. and The patient  required minimal assistance for basic and instrumental ADLs. He recently was admitted and discharged from [facility] 11/4/23 to 11/9/23. On 11/8/23 he was walking about 25 feet with RW and cg assistance. HOME SUPPORT PRIOR TO ADMISSION: The patient lived with spouse,daughter and grand daughter and required minimal assistance for ADL. Lives in 2 story home with 5 steps to enter and 14 steps to upstairs bed/bathroom. Physical Therapy Goals  Initiated 11/14/2023  1. Patient will move from supine to sit and sit to supine, scoot up and down, and roll side to side in bed with supervision/set-up within 7 day(s). 2.  Patient will perform sit to stand with standby assistance within 7 day(s). 3.  Patient will transfer from bed to chair and chair to bed with standby assistance using the least restrictive device within 7 day(s). 4.  Patient will ambulate with contact guard assist for 125 feet with the least restrictive device within 7 day(s). 5.  Patient will perform 15 sit to stands with contact guard assist within 7 day(s). Outcome: Not Progressing   PHYSICAL THERAPY EVALUATION    Patient: Selam Geiger  (78 y.o. male)  Date: 11/14/2023  Primary Diagnosis: Hepatorenal syndrome ([address]) [K76.7]  Other ascites [R18.8]  Acute liver failure with hepatic coma (HCC) [K72.01]  Liver failure without hepatic coma, unspecified chronicity ([address]) [K72.90]       Precautions: Up as Tolerated, Fall Risk, Bed Alarm                    ASSESSMENT :   DEFICITS/IMPAIRMENTS:   The patient is limited by decreased functional mobility, independence in ADLs, high-level IADLs, strength, activity tolerance, endurance,

## 2023-11-14 NOTE — CONSULTS
Acid 4.86 (HH) 0.40 - 2.00 mmol/L   POCT Glucose    Collection Time: 11/14/23  2:13 AM   Result Value Ref Range    POC Glucose 72 65 - 117 mg/dL    Performed by: Mekhi Asencio PCT    Comprehensive Metabolic Panel w/ Reflex to MG    Collection Time: 11/14/23  3:55 AM   Result Value Ref Range    Sodium 141 136 - 145 mmol/L    Potassium 3.8 3.5 - 5.1 mmol/L    Chloride 110 (H) 97 - 108 mmol/L    CO2 12 (LL) 21 - 32 mmol/L    Anion Gap 19 (H) 5 - 15 mmol/L    Glucose 62 (L) 65 - 100 mg/dL     (H) 6 - 20 MG/DL    Creatinine 7.22 (H) 0.70 - 1.30 MG/DL    Bun/Cre Ratio 14 12 - 20      Est, Glom Filt Rate 7 (L) >60 ml/min/1.73m2    Calcium 8.9 8.5 - 10.1 MG/DL    Total Bilirubin 13.5 (H) 0.2 - 1.0 MG/DL     (H) 12 - 78 U/L     (H) 15 - 37 U/L    Alk Phosphatase 408 (H) 45 - 117 U/L    Total Protein 6.7 6.4 - 8.2 g/dL    Albumin 2.7 (L) 3.5 - 5.0 g/dL    Globulin 4.0 2.0 - 4.0 g/dL    Albumin/Globulin Ratio 0.7 (L) 1.1 - 2.2     CBC with Auto Differential    Collection Time: 11/14/23  3:55 AM   Result Value Ref Range    WBC 11.0 4.1 - 11.1 K/uL    RBC 3.22 (L) 4.10 - 5.70 M/uL    Hemoglobin 8.9 (L) 12.1 - 17.0 g/dL    Hematocrit 27.8 (L) 36.6 - 50.3 %    MCV 86.3 80.0 - 99.0 FL    MCH 27.6 26.0 - 34.0 PG    MCHC 32.0 30.0 - 36.5 g/dL    RDW 20.9 (H) 11.5 - 14.5 %    Platelets 064 858 - 333 K/uL    MPV 9.3 8.9 - 12.9 FL    Nucleated RBCs 0.4 (H) 0  WBC    nRBC 0.04 (H) 0.00 - 0.01 K/uL    Neutrophils % 75 32 - 75 %    Lymphocytes % 18 12 - 49 %    Monocytes % 7 5 - 13 %    Eosinophils % 0 0 - 7 %    Basophils % 0 0 - 1 %    Immature Granulocytes 0 0.0 - 0.5 %    Neutrophils Absolute 8.2 (H) 1.8 - 8.0 K/UL    Lymphocytes Absolute 2.0 0.8 - 3.5 K/UL    Monocytes Absolute 0.8 0.0 - 1.0 K/UL    Eosinophils Absolute 0.0 0.0 - 0.4 K/UL    Basophils Absolute 0.0 0.0 - 0.1 K/UL    Absolute Immature Granulocyte 0.0 0.00 - 0.04 K/UL    Differential Type SMEAR SCANNED      RBC Comment ANISOCYTOSIS  2+ ________________________________________________________________________    ___________________________________________________  Consulting Provider: Aaron Diallo PA-C      11/14/2023  10:09 AM

## 2023-11-14 NOTE — CONSULTS
[address]         NAME:Chao Morris   HJX:[id number]   BDS:[birth year]     Pt KAKG--[id number]  Armond- pre renal vs hepatorenal syndrome  Acidosis  Liver cirrhosis  [  Plan  Iv bicarb  No RRT indicated at present  He is not a good hd candidate    Vitals:    11/14/23 1056   BP: 102/67   Pulse: 88   Resp:    Temp:    SpO2: 96%         Hepatorenal syndrome ([address]) [K76.7]  Other ascites [R18.8]  Acute liver failure with hepatic coma ([address]) [K72.01]  Liver failure without hepatic coma, unspecified chronicity ([address]) [K72.90]     Raplh Villatoro MD  [facility]  [facility]  [address] [place] [address]  Phone - [phone]         Fax - [phone] [facility] Office  [address] , [address] [facility], [address] [place] [address]  Phone - [phone]        Fax - [phone]

## 2023-11-14 NOTE — PROGRESS NOTES
times ambulated in hallways past shift: 0  Number of times OOB to chair past shift: 1    Cardiac:   Cardiac Monitoring: Yes           Access:  Current line(s): PIV     Genitourinary:   Urinary status: interiano    Respiratory:      Chronic home O2 use?: NO  Incentive spirometer at bedside: NO       GI:     Current diet:  ADULT ORAL NUTRITION SUPPLEMENT; Breakfast, Lunch, Dinner; Standard 4 oz Oral Supplement  ADULT DIET; Easy to Chew  Passing flatus: YES  Tolerating current diet: YES       Pain Management:   Patient states pain is manageable on current regimen: YES    Skin:     Interventions: float heels, increase time out of bed, limit briefs, internal/external urinary devices, and nutritional support    Patient Safety:  Fall Score:    Interventions: bed/chair alarm, gripper socks, and pt to call before getting OOB       Length of Stay:  Expected LOS: 4  Actual LOS: [address], RN

## 2023-11-14 NOTE — ED NOTES
Report given to Dario Hooker RN. Nurse was informed of reason for arrival, vitals, labs, medications, orders, procedures, results, anything left pending and current plan of action. Questions were asked and received prior to departure from the patient.       Citlali Wang RN  11/14/23 9151

## 2023-11-14 NOTE — PLAN OF CARE
Problem: Occupational Therapy - Adult  Goal: By Discharge: Performs self-care activities at highest level of function for planned discharge setting. See evaluation for individualized goals. Description: FUNCTIONAL STATUS PRIOR TO ADMISSION:  Patient reports performing ADLs at a mostly independent to mod I level, occasionally needing assistance for socks. He ambulates with a rollator. HOME SUPPORT: Patient was living family. He stays mainly on the second floor. Occupational Therapy Goals:  Initiated 11/14/2023  1. Patient will perform grooming standing at sink with Contact Guard Assist within 7 day(s). 2.  Patient will perform upper body dressing with Minimal Assist within 7 day(s). 3.  Patient will perform lower body dressing with Moderate Assist within 7 day(s). 4.  Patient will perform toilet transfers with Contact Guard Assist  within 7 day(s). 5.  Patient will perform all aspects of toileting with Moderate Assist within 7 day(s). 6.  Patient will sponge bathing with Moderate Assist within 7 day(s). Outcome: Progressing    OCCUPATIONAL THERAPY EVALUATION    Patient: Freedom Burton (78 y.o. male)  Date: 11/14/2023  Primary Diagnosis: Hepatorenal syndrome ([address]) [K76.7]  Other ascites [R18.8]  Acute liver failure with hepatic coma (HCC) [K72.01]  Liver failure without hepatic coma, unspecified chronicity ([address]) [K72.90]         Precautions: Up as Tolerated, Fall Risk, Bed Alarm                  ASSESSMENT :  The patient is limited by GW, decreased activity tolerance, abdominal swelling, decreased safety awareness and decreased balance which is impairing his functional independence. He is functioning below his independent to mod I baseline, now performing ADLs at a supervision/setup to total A level and he is min A of 1 to min A of 2 for functional mobility.  At this time the patient will continue to benefit from acute OT and he will need SNF rehab vs. [address] and PT at discharge depending on his situation;Disoriented to time;Disoriented to place;Oriented to person  Cognition  Overall Cognitive Status: Exceptions  Following Commands: Follows one step commands with repetition; Follows one step commands with increased time  Attention Span: Difficulty dividing attention; Difficulty attending to directions; Attends with cues to redirect  Safety Judgement: Decreased awareness of need for safety    Hearing:        Vision/Perceptual:    Vision - Basic Assessment  Prior Vision: Wears glasses only for reading        Perception  Overall Perceptual Status: WFL    Range of Motion:   AROM: Generally decreased, functional         Strength:  Strength: Generally decreased, functional      Coordination:  Coordination: Generally decreased, functional     Coordination: Within functional limits      Tone & Sensation:   Tone: Normal  Sensation: Intact          Functional Mobility and Transfers for ADLs:    Bed Mobility:     Bed Mobility Training  Bed Mobility Training: Yes  Interventions: Verbal cues; Visual cues; Tactile cues  Rolling: Contact-guard assistance  Supine to Sit: Minimum assistance  Sit to Supine: Minimum assistance  Scooting: Minimum assistance    Transfers:     Transfer Training  Transfer Training: Yes  Interventions: Tactile cues; Verbal cues; Visual cues; Safety awareness training  Sit to Stand: Minimum assistance;Assist X2  Stand to Sit: Moderate assistance  Bed to Chair: Minimum assistance;Assist X2;Other (comment)    Balance:  Standing: Impaired  Balance  Sitting: Impaired  Sitting - Static: Good (unsupported)  Sitting - Dynamic: Fair (occasional)  Standing: Impaired  Standing - Static: Good;Fair;Constant support  Standing - Dynamic: Fair;Constant support        ADL Assessment:     Feeding: Supervision;Setup       Grooming: Supervision;Setup  Grooming Skilled Clinical Factors: if performed seated    UE Bathing: Moderate assistance       LE Bathing: Maximum assistance       UE Dressing:  Moderate assistance       LE

## 2023-11-14 NOTE — PROGRESS NOTES
Pharmacy Medication Reconciliation     The patient was interviewed regarding current PTA medication list, use and drug allergies;  family present in room and obtained permission from patient to discuss drug regimen with visitor(s) present. The patient was questioned regarding use of any other inhalers, topical products, over the counter medications, herbal medications, vitamin products or ophthalmic/nasal/otic medication use. Allergy Update: Patient has no known allergies. Recommendations/Findings: The following amendments were made to the patient's active medication list on file at [facility]:   1) Additions: multivitamin    2) Deletions: atorvastatin, furosemide, losartan    3) Changes: diltiazem 240 mg ER daily      Pertinent Findings: Patient recently completed course of Augmentin and had all of his medications yesterday. Obtained history from daughter over the phone. Clarified PTA med list with daughter.  PTA medication list was corrected to the following:     Prior to Admission Medications   Prescriptions Last Dose Informant   Multiple Vitamins-Minerals (THERAPEUTIC MULTIVITAMIN-MINERALS) tablet Past Week Family Member   Sig: Take 1 tablet by mouth daily   amoxicillin-clavulanate (AUGMENTIN) 875-125 MG per tablet 2023 Family Member   Sig: Take 1 tablet by mouth 2 times daily for 5 days   dilTIAZem (TIAZAC) 240 MG extended release capsule 2023 Family Member   Si cap(s)   pantoprazole (PROTONIX) 40 MG tablet     Sig: Take 1 tablet by mouth 2 times daily (before meals)   pantoprazole (PROTONIX) 40 MG tablet 2023 Family Member   Sig: Take 1 tablet by mouth in the morning and at bedtime   sodium bicarbonate 650 MG tablet 2023 Family Member   Sig: Take 1 tablet by mouth 3 times daily      Facility-Administered Medications: None        Thank you,  Williams Khalil, [facility]

## 2023-11-14 NOTE — PLAN OF CARE
Pt did not rest well overnight, Pt voided very little overnight and did not have a bowel movement. Pt denied pain overnight and refused all drinks and snacks overnight. Problem: Discharge Planning  Goal: Discharge to home or other facility with appropriate resources  Outcome: Progressing  Flowsheets (Taken 11/14/2023 0200)  Discharge to home or other facility with appropriate resources: Identify barriers to discharge with patient and caregiver     Problem: Skin/Tissue Integrity  Goal: Absence of new skin breakdown  Description: 1. Monitor for areas of redness and/or skin breakdown  2. Assess vascular access sites hourly  3. Every 4-6 hours minimum:  Change oxygen saturation probe site  4. Every 4-6 hours:  If on nasal continuous positive airway pressure, respiratory therapy assess nares and determine need for appliance change or resting period.   Outcome: Progressing     Problem: Safety - Adult  Goal: Free from fall injury  Outcome: Progressing

## 2023-11-14 NOTE — PROGRESS NOTES
0    Pt arrived to XR recovery for Paracentesis. Pt is periodic confusion  A/O x3 with no complaints of pain. Pt arrived on Bicarb gtt going at 75 ml/hr and on tele monitor. VS to be collected and phone consent to be obtained from spouse. Pt bed is locked and in lowest position and call bell is within reach. 1410    Name of procedure: Paracentesis    Vital Signs:  stable    Fluids removed: 1400 ml of manav colored fluid    Samples sent to lab: Yes, Hold cup    Any complications related to procedure: Yes    Post Procedure Care Needed/order sets placed in connect care. Patient is at increased fall risk due to medication given. 1625    TRANSFER - OUT REPORT:    Verbal report given to [facility], RN on [employer].  being transferred to PCU for routine post-op       Report consisted of patient's Situation, Background, Assessment and   Recommendations(SBAR). Information from the following report(s) Nurse Handoff Report was reviewed with the receiving nurse. Lines:   Peripheral IV Proximal;Right;Dorsal Forearm (Active)   Site Assessment Clean, dry & intact 11/14/23 1500   Line Status Normal saline locked 11/14/23 1500   Line Care Connections checked and tightened 11/14/23 1500   Phlebitis Assessment No symptoms 11/14/23 1500   Infiltration Assessment 0 11/14/23 1500   Alcohol Cap Used Yes 11/14/23 1500   Dressing Status Clean, dry & intact 11/14/23 1500   Dressing Type Transparent 11/14/23 1500   Dressing Intervention New 11/14/23 1100        Opportunity for questions and clarification was provided.       Patient transported with tele monitor on and working and Bicarb gtt going at 75 ml/hr

## 2023-11-14 NOTE — CARE COORDINATION
Care Management Initial Assessment       RUR: 26% (Low RUR)  Readmission? No  1st IM letter given? Yes - 11/14  1st  letter given: No     11/14/23 6728   Service Assessment   Patient Orientation Alert and Oriented   Cognition Alert   History Provided By Child/Family   Primary Caregiver Spouse  Nabeel Kerr (Spouse)   [phone])   Support Systems Spouse/Significant Other  Nabeel Kerr (Spouse)   [phone])   Patient's Healthcare Decision Maker is: Legal Next of Kin   PCP Verified by CM Yes   Last Visit to PCP Within last 6 months   Prior Functional Level Assistance with the following:;Bathing;Dressing; Mobility; Shopping;Housework; Toileting;Cooking   Current Functional Level Assistance with the following:;Bathing;Dressing; Mobility; Shopping;Housework; Toileting;Cooking   Can patient return to prior living arrangement Yes   Ability to make needs known: Poor   Family able to assist with home care needs: Yes   Would you like for me to discuss the discharge plan with any other family members/significant others, and if so, who? Yes  Nabeel Kerr (Spouse)   [phone])   Financial Resources Medicare   Community Resources None   Social/Functional History   Lives With Spouse  Nabeel Kerr (Spouse)   [phone])   Type of Home House   Home Layout Two level   Home Access Stairs to enter with rails   Entrance Stairs - Number of Steps 5   Home Equipment Cane;Walker, rolling   Active  Yes   Discharge Planning   Type of Residence House   Current Services Prior To Admission None   Potential Assistance Needed N/A   Type of Home Care Services OT;PT   Patient expects to be discharged to: House   Condition of Participation: Discharge Planning   The Plan for Transition of Care is related to the following treatment goals: home health, SNF, IPR   The Patient and/or Patient Representative was provided with a Choice of Provider?  Patient;Patient Representative   Name of the Patient Representative who was provided

## 2023-11-14 NOTE — H&P
Hospitalist Admission Note    NAME:   Gely Sanchez. : 1950   MRN: [id number]     Date/Time: 2023 10:01 PM    Patient PCP: Diamante Mcgowan MD    ______________________________________________________________________  Given the patient's current clinical presentation, I have a high level of concern for decompensation if discharged from the emergency department. Complex decision making was performed, which includes reviewing the patient's available past medical records, laboratory results, and x-ray films. My assessment of this patient's clinical condition and my plan of care is as follows. Assessment / Plan:  History of hepatitis C  Liver cirrhosis  Decompensated liver failure  SBP  Elevated bilirubin  Diagnostic paracentesis done in the ED  fluid analysis sent  She will get full abdominal ultrasound with therapeutic paracentesis  IR consult  We will start empirically on ceftriaxone  We will consult with GI  MELD 37  Lactulose  Blood pressure is lower side. start Patient on midodrine  Total bilirubin 15  We will get direct bilirubin      JOANIE on CKD  Possible hepatorenal syndrome  Baseline creatinine around 3  Current creatinine 6.8  We will consult nephrology  Continues low rate bicarb drip  Midodrine  Albumin every 8 hours for 24 hours      Elevated lactic acid  Mild lactic acidosis  Likely due to liver failure  Continue to trend lactic acid  We will get venous blood gas  We will start patient on bicarb drip at low infusion rate    HTN  Patient on Cardizem  We will hold that for low blood pressure  We will do metoprolol as needed if needed for heart rate control    Medical Decision Making:   I personally reviewed labs: yes   I personally reviewed imaging:yes   I personally reviewed EKG:  Toxic drug monitoring: yes   Discussed case with: ED provider. After discussion I am in agreement that acuity of patient's medical condition necessitates hospital stay.       Code Status: Full code  DVT comparison made to prior CT dated 2023. Cardiomediastinal silhouette is stable. Lungs are hypoinflated. There is very mild bibasilar atelectasis. No pleural fluid is visualized there is mild blunting of the costophrenic angles bilaterally, possibly representing small bilateral pleural effusions. There is no pneumothorax. Mild bibasilar atelectasis. Mild blunting of the costophrenic angles. CT CHEST WO CONTRAST    Result Date: 2023  EXAM:  CT CHEST WO CONTRAST INDICATION:  please eval pneumonia and pleural effusion COMPARISON: None. . TECHNIQUE: Unenhanced multislice helical CT was performed from the thoracic inlet to the adrenal glands without intravenous contrast administration. Contiguous 5 mm axial images were reconstructed and lung and soft tissue windows were generated. Coronal and sagittal reformations were generated. CT dose reduction was achieved through use of a standardized protocol tailored for this examination and automatic exposure control for dose modulation. Sammi Carranza FINDINGS: CHEST: Chest wall/thoracic inlet: Within normal limits. Thyroid: Within normal limits. Mediastinum/apolinar: There are no pathologically enlarged mediastinal, hilar or axillary nodes. Heart/vessels: Heart is normal in size. No coronary artery calcifications. Aortic arch is normal in caliber. Mild vascular calcifications. Lungs/Pleura: Small left pleural effusion bibasilar atelectasis. Subtle groundglass opacities in the upper lobes left greater than right. . . ABDOMEN: Geographic fatty infiltration of the left hepatic lobe. Moderate ascites . MSK: Within normal limits. .    1. Small left pleural effusion with basilar atelectasis. Subtle groundglass opacities in the upper lobes likely infectious or inflammatory 2. Geographic fatty infiltration left hepatic lobe and ascites.      US GUIDED PARACENTESIS    Result Date: 2023  PATIENT NAME: Anamaria Heard                                            AGE, : 68 years,

## 2023-11-14 NOTE — PLAN OF CARE
Problem: Discharge Planning  Goal: Discharge to home or other facility with appropriate resources  11/14/2023 1609 by Alaina Perez RN  Outcome: Progressing  11/14/2023 0817 by Kellee Robles RN  Outcome: Progressing  Flowsheets (Taken 11/14/2023 0200)  Discharge to home or other facility with appropriate resources: Identify barriers to discharge with patient and caregiver     Problem: Skin/Tissue Integrity  Goal: Absence of new skin breakdown  Description: 1. Monitor for areas of redness and/or skin breakdown  2. Assess vascular access sites hourly  3. Every 4-6 hours minimum:  Change oxygen saturation probe site  4. Every 4-6 hours:  If on nasal continuous positive airway pressure, respiratory therapy assess nares and determine need for appliance change or resting period. 11/14/2023 1609 by Alaina Perez RN  Outcome: Progressing  11/14/2023 0817 by Kellee Robles RN  Outcome: Progressing     Problem: Safety - Adult  Goal: Free from fall injury  11/14/2023 1609 by Alaina Perez RN  Outcome: Progressing  11/14/2023 0817 by Kellee Robles RN  Outcome: Progressing     Problem: Occupational Therapy - Adult  Goal: By Discharge: Performs self-care activities at highest level of function for planned discharge setting. See evaluation for individualized goals. Description: FUNCTIONAL STATUS PRIOR TO ADMISSION:  Patient reports performing ADLs at a mostly independent to mod I level, occasionally needing assistance for socks. He ambulates with a rollator. HOME SUPPORT: Patient was living family. He stays mainly on the second floor. Occupational Therapy Goals:  Initiated 11/14/2023  1. Patient will perform grooming standing at sink with Contact Guard Assist within 7 day(s). 2.  Patient will perform upper body dressing with Minimal Assist within 7 day(s). 3.  Patient will perform lower body dressing with Moderate Assist within 7 day(s).   4.  Patient will perform toilet transfers with Contact Guard

## 2023-11-14 NOTE — PROGRESS NOTES
Speech Pathology Note    SLP orders received. Chart reviewed and discussed with RN. Attempted SLP visit, however pharmacist currently with patient/family at bedside. Will defer SLP evaluation at this time and continue to follow. Addendum 15:30  SLP visit attempted, however patient THIAGO for procedure. Will continue to defer and will follow up tomorrow. Thank you.     Melanie Perez M.S., CCC-SLP

## 2023-11-14 NOTE — PROGRESS NOTES
Hospitalist Progress Note    NAME:   Reshma Park. : 1950   MRN: [id number]     Date/Time: 2023 2:10 PM  Patient PCP: Woody Jenkins MD    Estimated discharge date: 2 days  Barriers: Creatinine improvement, liver function improvement, Paracentesis. Assessment / Plan:    History of hepatitis C  Liver cirrhosis  Decompensated liver failure  Hyperbilirubinemia:  Moderate transaminitis:  Elevated alkaline phosphatase  Spontaneous bacterial peritonitis ruled out  Gastroenterology on board. Increase the frequency of lactulose. IV paracentesis. Interventional radiology consulted. Continue to monitor liver function test.        JOANIE on CKD with possible superimposed acute tubular necrosis:  Possible hepatorenal syndrome  Baseline creatinine around 3  Current creatinine 6.8  We will consult nephrology  Continues bicarb drip  Midodrine  Albumin every 8 hours for 24 hours    Urinary tract infection:  -Urine analysis showed WBC greater than 100.  -Continue rocephin  -Await urine cultures. Elevated lactic acid  Mild lactic acidosis  Likely due to liver failure  Continue to trend lactic acid  We will start patient on bicarb drip      Essential HTN  Patient on Cardizem  We will hold that for low blood pressure  We will do metoprolol as needed if needed for heart rate control    Medical Decision Making:   I personally reviewed labs: cbc, bmp  I personally reviewed imaging:  I personally reviewed EKG:  Toxic drug monitoring: Cr  Discussed case with: Patient and nurse    Code Status: Full  DVT Prophylaxis: Heparin Subcutaneous  GI Prophylaxis:    Subjective:     Gastroenterology nephrology recommendations noted. Cardiac paracentesis. Ordered tramadol for pain control. Ordered lidocaine patch. Objective:     VITALS:   Last 24hrs VS reviewed since prior progress note.  Most recent are:  Patient Vitals for the past 24 hrs:   BP Temp Temp src Pulse Resp SpO2 Height Weight   23 1056 LABS:  I reviewed today's most current labs and imaging studies.   Pertinent labs include:  Recent Labs     11/13/23 1952 11/14/23 0355   WBC 11.7* 11.0   HGB 8.9* 8.9*   HCT 27.7* 27.8*    174     Recent Labs     11/13/23 1952 11/13/23  2100 11/14/23 0355     --  141   K 3.5  --  3.8     --  110*   CO2 17*  --  12*   GLUCOSE 64*  --  62*   BUN 94*  --  103*   CREATININE 6.89*  --  7.22*   CALCIUM 9.2  --  8.9   MG 2.4  --   --    LABALBU 2.9*  --  2.7*   BILITOT 15.1*  --  13.5*   *  --  437*   *  --  551*   INR  --  1.8* 2.4*       Signed: Adria Conway MD

## 2023-11-15 NOTE — PROGRESS NOTES
[address]  [phone]     Patient Name: Jazmine Stratton. YOB: 1950  Age: 68 y.o. [facility] RN Note:  Hospice consult received, reviewing chart. Will follow up with Unit Nurse and Care Manager to discuss plan of care, patient status and discharge disposition within the hour. SHAW Page and this liaison met with wife Oswaldo Vivar, son Vianca Yo and several other family members at bedside. Pt is obtunded and unarousable. Discussed Hospice philosophy, general plan of care, levels of care, services. We also discussed code status. Family has many questions and differing views on how end of life care should be provided. Pt is appropriate for inpatient hospice and was reviewed by our medical director and this was offered to wife. She will need some time to process the decision to focus on comfort care. We will continue to assess daily and be available to answer any questions. Pt does not currently have any comfort meds in place. Thank you for the opportunity to be of service to this patient.        Dejuan Perez RN, [address] [place] [address] Nurse Liaison  [phone] [place]  [phone] Office

## 2023-11-15 NOTE — PROGRESS NOTES
Bedside shift change report given to [facility] A[facility]INO, RN (oncoming nurse) by Monique Blair RN (offgoing nurse). Report included the following information Nurse Handoff Report, Recent Results, Cardiac Rhythm NSR with 1st degree AV block and BBB, and Quality Measures. 0740--Rectal temperature 96.2. Alvarez hugger re-applied. 0950--Dr. Addis Forrest called to bedside as patient not very responsive--will occasionally move but not responding to stimulation/talking (tried sternal rub and cold wet cloth). He has not been alert enough to take lactulose. Earlier, he was mumbling to himself and only knew his name. 1010--Dr. Addis Forrest talked to wife about prognosis and she made decision to make patient a DNR. Hospice consult placed. Family to come around noon to discuss plan. 1340--Hospice nurse and Dr. Addis Forrest at bedside meeting with family. 1430--Family wants to wait on hospice care for now. 1850--Patient taken down to CT scan per orders. End of Shift Note    Bedside shift change report given to Kalia William RN (oncoming nurse) by Ascencion Ayala RN (offgoing nurse). Report included the following information SBAR, Kardex, Cardiac Rhythm NSR with BBB, and Quality Measures    Shift worked:  Day shift     Shift summary and any significant changes:     Family wants to do NG tube tube insertion with lactulose administration. Patient taken down to CT scan at end of shift. Patient on 2 liters NC of oxygen a oxygen was 87-88%. Hospice to do another meeting with family tomorrow. MD aware about patient's mentation and not being very responsive at times. Mentation and LOC have varied through out the day--sometimes he will open eyes spontaneously but other times he will not respond to voice or stimulation. 50 mL output from interiano this shift of dark brown urine. Speech was unable to evaluate patient due to LOC. All oral medications held today. Alvarez hugger now off as patient's temperature is stable. Potassium replaced x 2 bags IV. Concerns for physician to address:  Urine output; decreased LOC; kidney function     Zone phone for oncoming shift:          Activity:     Number times ambulated in hallways past shift: 0  Number of times OOB to chair past shift: 0    Cardiac:   Cardiac Monitoring: Yes           Access:  Current line(s): PIV     Genitourinary:   Urinary status: interiano    Respiratory:      Chronic home O2 use?: NO  Incentive spirometer at bedside: NO       GI:     Current diet:  ADULT ORAL NUTRITION SUPPLEMENT; Breakfast, Lunch, Dinner; Standard 4 oz Oral Supplement  ADULT DIET; Easy to Chew  Passing flatus: YES  Tolerating current diet: YES       Pain Management:   Patient states pain is manageable on current regimen: YES    Skin:     Interventions: float heels and internal/external urinary devices    Patient Safety:  Fall Score:    Interventions: bed/chair alarm and gripper socks       Length of Stay:  Expected LOS: 4  Actual LOS: 2      Radha Pugh RN

## 2023-11-15 NOTE — PROGRESS NOTES
Physician Progress Note      PATIENT:               Angel JENNINGS #:                  [id number]  :                       1950  ADMIT DATE:       2023 7:36 PM  DISCH DATE:  RESPONDING  PROVIDER #:        Mariia Daniels MD        QUERY TEXT:    Stage of Chronic Kidney Disease: Please provide further specificity, if known. Clinical indicators include: chronic renal failure, bun, creatinine, ckd, cr  Options provided:  -- Chronic kidney disease stage 1  -- Chronic kidney disease stage 2  -- Chronic kidney disease stage 3  -- Chronic kidney disease stage 3a  -- Chronic kidney disease stage 3b  -- Chronic kidney disease stage 4  -- Chronic kidney disease stage 5  -- Chronic kidney disease stage 5, requiring dialysis  -- End stage renal disease  -- Other - I will add my own diagnosis  -- Disagree - Not applicable / Not valid  -- Disagree - Clinically Unable to determine / Unknown        PROVIDER RESPONSE TEXT:    The patient has chronic kidney disease stage 4.       Electronically signed by:  Mariia Daniels MD 11/15/2023 8:13 AM

## 2023-11-15 NOTE — PROGRESS NOTES
Comprehensive Nutrition Assessment    Type and Reason for Visit:  Initial, Positive Nutrition Screen    Nutrition Recommendations/Plan:   Continue current diet and supplements as tolerated, hold PO if too lethargic      Malnutrition Assessment:  Malnutrition Status: Moderate malnutrition (11/15/23 1137)    Context:  Chronic Illness     Findings of the 6 clinical characteristics of malnutrition:  Energy Intake:  Mild decrease in energy intake (Comment)  Weight Loss:  Unable to assess     Body Fat Loss:  Mild body fat loss Triceps   Muscle Mass Loss:  Severe muscle mass loss Clavicles (pectoralis & deltoids), Thigh (quadraceps)  Fluid Accumulation:  Mild Extremities   Strength:  Not Performed    Nutrition Assessment:    Chart reviewed and patient dicussed during PCU IDR's. Medically noted for cirrhosis, ascites, JOANIE on CKD, and Hep C. Recently admitted and met criteria for moderate malnutrition; was receiving ensure enlive supplements to help supplement PO intake. Patient typically ate 3 meals/day at home but didn't consume everything on his plate per his report. Patient made DNR this morning and hospice consulted. SLP unable to evaluate. Continue diet/supplements as tolerated. Will monitor progress and plan of care. Nutrition Related Findings:    Na 145, K+ 3.2, Phos 6.9, BG 94--104   BM 11/15   2-3+ edema BLE   Lactulose, Midodrine, Effer-K, Na Bicarb   Wound Type: Wound Consult Pending       Current Nutrition Intake & Therapies:          ADULT ORAL NUTRITION SUPPLEMENT; Breakfast, Lunch, Dinner; Standard 4 oz Oral Supplement  ADULT DIET; Easy to Chew    Anthropometric Measures:  Height: 180.3 cm (5' 11\")  Ideal Body Weight (IBW): 172 lbs (78 kg)       Current Body Weight: 105.7 kg (233 lb 0.4 oz),   IBW. Current BMI (kg/m2): 32.5                          BMI Categories: Obese Class 1 (BMI 30.0-34. 9)    Estimated Daily Nutrient Needs:  Energy Requirements Based On: Formula  Weight Used for Energy Requirements: Current  Energy (kcal/day): 2190 kcals (BMR x 1. 3AF)  Weight Used for Protein Requirements: Current  Protein (g/day): 85-106g (0.8-1.0 g/kg bw)  Method Used for Fluid Requirements: 1 ml/kcal  Fluid (ml/day): 1800 mL or per MD    Nutrition Diagnosis:   Inadequate protein-energy intake related to cognitive or neurological impairment (lethargy) as evidenced by intake 0-25%    Nutrition Interventions:   Food and/or Nutrient Delivery: Continue Current Diet, Continue Oral Nutrition Supplement  Nutrition Education/Counseling: No recommendation at this time  Coordination of Nutrition Care: Continue to monitor while inpatient       Goals:     Goals: PO intake 50% or greater, by next RD assessment       Nutrition Monitoring and Evaluation:   Behavioral-Environmental Outcomes: None Identified  Food/Nutrient Intake Outcomes: Food and Nutrient Intake, Supplement Intake  Physical Signs/Symptoms Outcomes: Biochemical Data, Weight, GI Status, Fluid Status or Edema    Discharge Planning:     Too soon to determine     Jose Rodríguez RD  Contact: ext [phone]

## 2023-11-15 NOTE — PROGRESS NOTES
Physical Therapy    Chart reviewed. Discussed patient in rounds with nurse and MD, both state patient is not medically appropriate for PT treatment session at this time. Will defer and continue to follow.     Eagle Duran, PT, DPT

## 2023-11-15 NOTE — CONSULTS
PALLIATIVE MEDICINE      Hospice consult noted, will cancel Palliative consult. Thank you for including Palliative Medicine in this patient's care.    KIMBERLY Gant

## 2023-11-15 NOTE — ACP (ADVANCE CARE PLANNING)
Advance Care Planning Note      NAME: Gely Sanchez. :  1950   MRN:  [id number]     Date/Time:  11/15/2023 10:08 AM    Active Diagnoses:    Liver Failure  Acute Kidney Injury    These active diagnoses are of sufficient risk that focused discussion on advance care planning is indicated in order to allow the patient to thoughtfully consider personal goals of care, and if situations arise that prevent the ability to personally give input, to ensure appropriate representation of their personal desires for different levels and aggressiveness of care. Discussion:   Code status addressed and wants to be a Do Not Resuscitate. Persons present and participating in discussion: Bradford Cervantes MD, Patient wife and daughter      Time Spent:   Total time spent face-to-face in education and discussion:   20  minutes.          Bradford Cervantes MD   Hospitalist

## 2023-11-15 NOTE — PROGRESS NOTES
Occupational Therapy    Chart reviewed. Discussed patient with PT who attended rounds with nurse and MD. Per PT both nursing and MD state patient is not medically appropriate for OT and PT at this time. Hospice is being consulted. Will defer and continue to follow pending medical plan of care.     Wan Stock, OTR/L

## 2023-11-15 NOTE — PROGRESS NOTES
Speech Pathology Contact Note:    Orders received and appreciated for SLP evaluation. Patient with increased lethargy and unable to arouse despite MAX stimuli (including sternal rub, cold compress, and verbal stimulation). RN reports yesterday patient accepted small amounts of PO (poor PO intake at baseline). SLP will follow up as patient appropriate. Thanks!      Chay Hallman, CCC-SLP

## 2023-11-15 NOTE — CONSULTS
[facility]    Name:  Warrick Fabry  MR#:  [id number]  :  1950  ACCOUNT #:  [de-identified]  DATE OF SERVICE:  2023      REFERRING PHYSICIAN:  Dr. Simeon Mcnamara. REASON FOR CONSULTATION:  Acute kidney injury. HISTORY OF PRESENT ILLNESS:  The patient is a 80-year-old [place] American male with past medical history significant for liver cirrhosis, CKD, hypertension, who is admitted with complaint of feeling bad and poor p.o. intake, and abdominal distension. The patient is found to have acute renal failure with creatinine level of 7.2 and BUN of 103. His bicarb level is 12. The patient has been seen by Dr. Landen Abraham with his recent admission. Last month his creatinine level was 3.28 on 2023. He has been on lactulose for his liver cirrhosis. He was discharged from the hospital on 2023 five days ago. His Lasix was discontinued at the time of discharge. PAST MEDICAL HISTORY:  1. Liver cirrhosis. 2.  Anemia. 3.  Hyperlipidemia. 4.  Hypertension. 5.  Hep C. PAST SURGICAL HISTORY:  1. Colon resection. 2.  EGD and colonoscopy. SOCIAL HISTORY:  Ex-smoker. FAMILY HISTORY:  Negative for any kidney disease. HOME MEDICATIONS:  1. Sodium bicarb. 2.  Augmentin. 3.  Diltiazem. 4.  Protonix. REVIEW OF SYSTEMS:  Poor p.o. intact. Decreased urine output. No shortness of breath. No diarrhea or vomiting. Denies any chest pain, shortness of breath or fever. PHYSICAL EXAMINATION:  VITAL SIGNS:  Temperature 97.4, pulse 67, blood pressure 106/60, respiratory rate 15 per minute. Oxygen saturation 98%. GENERAL:  The patient is sitting up in the chair, not in apparent distress. Alert, awake, oriented x3. HEENT:  Oral mucosa is dry. LUNGS:  Clear to auscultation bilaterally. CARDIAC:  Normal S1, S2.  Regular rate and rhythm. ABDOMEN:  Soft, distended, nontender. EXTREMITIES:  Edema present. NEURO:  Nonfocal, normal speech.   PSYCH:  Normal

## 2023-11-15 NOTE — PLAN OF CARE
Had extensive goals of care discussion with family members today. Spoke to the wife in the morning around 10 AM on the phone. Met with multiple family members in person at noon, 3:30 PM, 5 PM.  Spoke to the daughter at 6:30 PM on the phone. Patient has advanced liver disease. Patient now developed worsening kidney function likely secondary to hepatorenal syndrome versus acute tubular necrosis. Plan of care discussed with gastroenterology and nephrology. Nephrology stated that hemodialysis is futile. Internal medicine, nephrology, gastroenterology agreed that hospice is the best course of action given the current clinical condition of the patient. After multiple discussion with family members, plan is to continue IV fluids. Administer lactulose via NG tube. Patient is DO NOT RESUSCITATE. Confirmed with family members multiple times throughout the day regarding CODE STATUS. Patient family members concerned about abdominal distention. Will order CT abdomen pelvis without contrast.  Ordered NG tube. Administer lactulose via NG tube after confirmation of NG tube position. Family is aware that patient is at risk for cardiac arrest overnight. Verbalized understanding.

## 2023-11-15 NOTE — PLAN OF CARE
Pt had very low urine output overnight and was turned frequently. Shift handoff report given to [facility] RN. Problem: Discharge Planning  Goal: Discharge to home or other facility with appropriate resources  Outcome: Progressing     Problem: Skin/Tissue Integrity  Goal: Absence of new skin breakdown  Description: 1. Monitor for areas of redness and/or skin breakdown  2. Assess vascular access sites hourly  3. Every 4-6 hours minimum:  Change oxygen saturation probe site  4. Every 4-6 hours:  If on nasal continuous positive airway pressure, respiratory therapy assess nares and determine need for appliance change or resting period.   Outcome: Progressing     Problem: Safety - Adult  Goal: Free from fall injury  Outcome: Progressing     Problem: Pain  Goal: Verbalizes/displays adequate comfort level or baseline comfort level  Outcome: Progressing

## 2023-11-15 NOTE — PROGRESS NOTES
GI PROGRESS NOTE  Meagan Rutherford  [phone] NP in-hospital cell phone M-F until 4:30  After 5pm or on weekends, please call  for physician on call    NAME:Chao Zamora. SBO:[birth year] XQR:[id number]   ATTG: [unfilled]   PCP: Michelle Lamar MD  Date/Time:  11/15/2023 12:20 PM     Primary GI: Tish Runner    Reason for following: Cirrhosis with ascites. Assessment:   Hyperbilirubinemia  Transaminitis  Hepatitis C s/p tx  Hepatic steatosis  Cirrhosis with ascites  Patient who is known to us with cirrhosis secondary to chronic HCV and MRI confirmed portal vein thrombosis presents with increased abdominal pain, not eating for two days, and abdominal swelling. VSS today, lower BP on admission with 97/54 as well as today 101/56. HgB stable at 8.9 yesterday. No overt signs of bleeding per patient, nursing, or family. INR 2.4 11/14  WBC 11 11/14  AST/ALT/ALP chronically elevated at 437/551/408 11/14- slightly improved from 11/13. Infectious Work up shows negative Blood cultures and CXR with atelectasis. UA noted with significant epithelial cells. Paracentesis rashi 1.6L and PMN 68 not consistent with SBP. Scope hx  EGD 9/27/23 w Dr. Tish Runner:  Normal proximal, mid, and distal esophagus. Notably no esophageal varices  Large, cratered ulcer along posterior-lateral wall of antrum was seen with very large adherent clot. The clot could only be partially removed and revealed underlying active oozing, Andrew classification 1b. Compete hemostasis was achieved with 'Ultra' hemostatic clip placement x 3 and bipolar probe/cautery. Heme was seen in gastric body  Stomach otherwise normal, including retroflexion. Notably no gastric varices  Downstream blood in duodenal bulb and 2nd/3rd portion of the duodenum from gastric ulcer   COLON - 2014 1 adenoma. Plan:   Patient with very poor prognosis with worsening liver and kidney function. Appreciate hospice consult to discuss goals of care.   Could consider

## 2023-11-16 PROBLEM — Z51.5 END OF LIFE CARE: Status: ACTIVE | Noted: 2023-01-01

## 2023-11-16 PROBLEM — K76.7 HEPATORENAL SYNDROME (HCC): Status: ACTIVE | Noted: 2023-01-01

## 2023-11-16 PROBLEM — Z71.89 ACP (ADVANCE CARE PLANNING): Status: ACTIVE | Noted: 2023-01-01

## 2023-11-16 PROBLEM — R52 GENERALIZED PAIN: Status: ACTIVE | Noted: 2023-01-01

## 2023-11-16 NOTE — PROGRESS NOTES
Chart reviewed. Noted that family has decided to pursue comfort care while awaiting home hospice set-up. Will complete PT order at this time.  Thank you    Lexii Broussard, PT , DPT

## 2023-11-16 NOTE — CONSULTS
getting swollen. Psychosocial: lives with wife, has 3 children, ~10 grandchildren and 1 great-grand child. Family members are all very involved in patient's care. PALLIATIVE DIAGNOSES:    Hepatic encephalopathy  General weakness and fatigue  Poor appetite  Anuria   Hepatorenal syndrome  Hypoglycemia   Ascites  Pain   Care decisions      Pertinent Hospital Diagnoses:  Principal Problem:    Liver failure without hepatic coma, unspecified chronicity ([address])  Resolved Problems:    * No resolved hospital problems. *      ASSESSMENT AND PLAN:   Today, I am treating Freedom Burton for hepatorenal syndrome which is in severe progression as evidenced by decline in patients condition, continued worsening of liver and kidney function, as described in the note. Prior to visit, I completed an extensive review of patient's medical records, including medical documentation, vital signs, MARs, and results of various labs and other diagnostics. I also spoke with patient's nurse, Lian Tamayo, attending , Hospice Liaison [address],  i[place]. 2:00-3:30pm:  Met with wife Megan, 2 sons, daughter, niece, : obtained history, discussed pt's medical conditions, goals of care and code status. Reviewed that pt is in multisystem organ failure; liver, kidneys and GI tract. We discussed that there are no more treatment options that will improve his condition; his liver failure is at end stage and he is not a transplant candidate, his kidney failure continues to worsen and he is not a candidate for dialysis given his renal failure, and now his gut has failed and there is nothing other than suction to prevent vomiting and aspiration that will help him. All we have left to offer is comfort care.   Family voiced disappointment and anger at what they feel is medical personnel failure to communicate; pt has been admitted multiple times in the past few months for liver problems and kidney problems, that they recently saw a use, vitals, and chart     FINAL COMMENTS   Thank you for allowing Palliative Medicine to participate in the care of Ruth Ware. .    Only check if applicable and billing time based rather than MDM  [] The total encounter time on this service date was __120__ minutes which was spent performing a face-to-face encounter and personally completing the provider-level activities documented in the note. This includes time spent prior to the visit and after the visit in direct care of the patient. This time does not include time spent in any separately reportable services.     Electronically signed by   DOMINGO Paige NP  Palliative Care Team  on 11/16/2023 at 3:58 PM

## 2023-11-16 NOTE — PROGRESS NOTES
Speech Pathology Note    Discussed with RN and SLP evaluation attempted, however patient refusing all attempts at PO on this date as evidenced by shaking head no, moving head away from bolus, and lips remaining closed despite maximal verbal and tactile cues. Note NGT placed overnight and Hospice consulted. Will defer SLP evaluation at this time and will continue to follow pending [address] [facility]. Thank you.     Vane Rand M.S., CCC-SLP

## 2023-11-16 NOTE — PLAN OF CARE
Problem: Discharge Planning  Goal: Discharge to home or other facility with appropriate resources  11/15/2023 2046 by Jomar Perdomo RN  Outcome: Not Progressing  11/15/2023 0939 by Irena Fu RN  Outcome: Progressing     Problem: Skin/Tissue Integrity  Goal: Absence of new skin breakdown  Description: 1. Monitor for areas of redness and/or skin breakdown  2. Assess vascular access sites hourly  3. Every 4-6 hours minimum:  Change oxygen saturation probe site  4. Every 4-6 hours:  If on nasal continuous positive airway pressure, respiratory therapy assess nares and determine need for appliance change or resting period. 11/15/2023 2046 by Jomar Perdomo RN  Outcome: Not Progressing  11/15/2023 0939 by Irena Fu RN  Outcome: Progressing     Problem: Safety - Adult  Goal: Free from fall injury  11/15/2023 2046 by Jomar Perdomo RN  Outcome: Progressing  11/15/2023 0939 by Irena Fu RN  Outcome: Progressing     Problem: Pain  Goal: Verbalizes/displays adequate comfort level or baseline comfort level  11/15/2023 2046 by Jomar Perdomo RN  Outcome: Progressing  11/15/2023 0939 by Irena Fu RN  Outcome: Progressing     Problem: Discharge Planning  Goal: Discharge to home or other facility with appropriate resources  11/15/2023 2046 by Jomar Perdomo RN  Outcome: Not Progressing  11/15/2023 0939 by Irena Fu RN  Outcome: Progressing     Problem: Skin/Tissue Integrity  Goal: Absence of new skin breakdown  Description: 1. Monitor for areas of redness and/or skin breakdown  2. Assess vascular access sites hourly  3. Every 4-6 hours minimum:  Change oxygen saturation probe site  4. Every 4-6 hours:  If on nasal continuous positive airway pressure, respiratory therapy assess nares and determine need for appliance change or resting period.   11/15/2023 2046 by Jomar Perdomo RN  Outcome: Not Progressing  11/15/2023 0939 by Irena Fu RN  Outcome: Progressing

## 2023-11-16 NOTE — WOUND CARE
Wound Care consult for the Gluteal cleft fissure present on admission. Chart reviewed and patient assessed. Pt. Is non-communicative but does groan every time he is turned / moved. Assessment and Recommended Treatment for his skin:  Pt. Turned and a stool noted on the chux. Pt. Was cleaned up and then zinc oxide applied to the healing pink fissure in the gluteal cleft. Recommend a layer of zinc oxide remain on the skin to heal and protect the skin from incontinence. Plan: Pressure injury prevention and incontinence management. Pt. All the way on his side with pillows in between the knees and behind the upper back and the thighs. Turn every two hours. Float heels if he has to be on his back for any period of time. Incontinence supplies in the room now.    Gris Galdamez RN, BSN, [employer]

## 2023-11-16 NOTE — PROGRESS NOTES
Anesthesia Transfer of Care Note    Patient: Vinnie Scott    Procedure(s) Performed: Procedure(s) (LRB):  COLONOSCOPY (N/A)    Patient location: PACU    Anesthesia Type: general    Transport from OR: Transported from OR on room air with adequate spontaneous ventilation. Continuous PA pressure monitoring in transport    Post pain: adequate analgesia    Post assessment: no apparent anesthetic complications and tolerated procedure well    Post vital signs: stable    Level of consciousness: awake    Nausea/Vomiting: no nausea/vomiting    Complications: none    Transfer of care protocol was followed      Last vitals:   Visit Vitals  BP (!) 92/54   Pulse 66   Temp 36.4 °C (97.5 °F) (Oral)   Resp 14   Ht 6' (1.829 m)   Wt 85.7 kg (189 lb)   SpO2 95%   BMI 25.63 kg/m²      Occupational Therapy    Chart reviewed. Noted that family has decided to pursue comfort care while awaiting home hospice set-up. Will complete OT order at this time.  Thank you    Wan Salcedo, OTR/L

## 2023-11-16 NOTE — PROGRESS NOTES
PALLIATIVE MEDICINE      Family meeting at 2pm today to discuss goals of care. Thank you for including Palliative Medicine in this patient's care.    Nels Brunner, FNP-C

## 2023-11-17 PROBLEM — R45.1 AGITATION: Status: ACTIVE | Noted: 2023-01-01

## 2023-11-17 NOTE — PLAN OF CARE
Problem: Discharge Planning  Goal: Discharge to home or other facility with appropriate resources  Outcome: Progressing  Flowsheets (Taken 11/16/2023 0721)  Discharge to home or other facility with appropriate resources:   Arrange for needed discharge resources and transportation as appropriate   Identify barriers to discharge with patient and caregiver     Problem: Skin/Tissue Integrity  Goal: Absence of new skin breakdown  Description: 1. Monitor for areas of redness and/or skin breakdown  2. Assess vascular access sites hourly  3. Every 4-6 hours minimum:  Change oxygen saturation probe site  4. Every 4-6 hours:  If on nasal continuous positive airway pressure, respiratory therapy assess nares and determine need for appliance change or resting period.   Outcome: Progressing     Problem: Safety - Adult  Goal: Free from fall injury  Outcome: Progressing     Problem: Pain  Goal: Verbalizes/displays adequate comfort level or baseline comfort level  Outcome: Progressing  Flowsheets (Taken 11/16/2023 0721)  Verbalizes/displays adequate comfort level or baseline comfort level:   Encourage patient to monitor pain and request assistance   Assess pain using appropriate pain scale

## 2023-11-17 NOTE — PROGRESS NOTES
Spiritual Care Assessment/Progress Note  [facility]    Name: Dakotah Vines. MRN: [id number]    Age: 68 y.o. Sex: male   Language: English     Date: 11/17/2023            Total Time Calculated: 5 min              Spiritual Assessment begun in [facility] 2 PROGRESSIVE CARE  Service Provided For[de-identified] Patient not available  Referral/Consult From[de-identified] Palliative Care  Encounter Overview/Reason : Attempted Encounter    Spiritual beliefs:      [] Involved in a estela tradition/spiritual practice:      [] Supported by a estela community:      [] Claims no spiritual orientation:      [] Seeking spiritual identity:           [] Adheres to an individual form of spirituality:      [x] Not able to assess:                Identified resources for coping and support system:   Support System: Family members       [] Prayer                  [] Devotional reading               [] Music                  [] Guided Imagery     [] Pet visits                                        [] Other: (COMMENT)     Specific area/focus of visit   Encounter:    Crisis:    Spiritual/Emotional needs: Type: Spiritual Support  Ritual, Rites and Sacraments:    Grief, Loss, and Adjustments: Type: Adjustment to illness  Ethics/Mediation:    Behavioral Health:    Palliative Care: Advance Care Planning:           Narrative: Attempted initial spiritual assessment with palliative pt in 2315. Pt soundly sleeping, didn't respond to . Silent blessing at bedside. No family present. Chaplains available as needed/able.

## 2023-11-17 NOTE — PROGRESS NOTES
720 Report received from Eric Breaux to include SBAR, MAR and Kardex. 1230 NG tube removed for comfort. 1930 message sent to palliative. Some family members concerned about prognosis and would like other treatment options including a feeding tube. End of Shift Note    Bedside shift change report given to Ludivina (oncoming nurse) by Alexandro Lin RN (offgoing nurse). Report included the following information SBAR, Kardex, and MAR    Shift worked:  7a-7p     Shift summary and any significant changes:     Ng tube removed     Concerns for physician to address: Alternative options like feeding tubes and treatment.     Zone phone for oncoming shift:          Activity:     Number times ambulated in hallways past shift: 0  Number of times OOB to chair past shift: 0    Cardiac:   Cardiac Monitoring: Yes           Access:  Current line(s): PIV     Genitourinary:   Urinary status: interiano    Respiratory:      Chronic home O2 use?: NO  Incentive spirometer at bedside: NO       GI:     Current diet:  ADULT ORAL NUTRITION SUPPLEMENT; Breakfast, Lunch, Dinner; Standard 4 oz Oral Supplement  ADULT DIET; Easy to Chew  Passing flatus: YES  Tolerating current diet: YES, no appetite         Pain Management:   Patient states pain is manageable on current regimen: N/A    Skin:     Interventions: float heels and internal/external urinary devices    Patient Safety:  Fall Score:    Interventions: bed/chair alarm and pt to call before getting OOB       Length of Stay:  Expected LOS: 4  Actual LOS: [address], RN

## 2023-11-17 NOTE — PROGRESS NOTES
Nutrition: Chart reviewed and patient discussed during PCU IDR's. Plans for comfort measures with transition to hospice at home over the weekend. Nutrition will sign off. Continue PO diet as tolerated/desired. Thanks.   Ines Harden, [address] [facility]  Ext [phone]

## 2023-11-17 NOTE — PROGRESS NOTES
[]Tachypnea  [] Other:  Abdomen: [] Soft/non-tender  [] Normal appearance  [x] Distended  [x] Ascites  [] Other:  Neurological: [] Normal speech  [] Normal sensation  [x]Deficits present: awake, disoriented, hallucinating  Extremity: [x] Normal skin color/temp  [] Clubbing/cyanosis  [] No edema  [x] Other:ascites and edema    Wt Readings from Last 15 Encounters:   11/16/23 101.8 kg (224 lb 6.9 oz)   11/07/23 103.1 kg (227 lb 4.7 oz)   09/25/23 107.2 kg (236 lb 5.3 oz)   09/26/23 107.2 kg (236 lb 5.3 oz)   09/22/23 108.6 kg (239 lb 6.7 oz)   09/14/21 129.9 kg (286 lb 6.4 oz)   08/03/21 127.5 kg (281 lb)        Current Diet: ADULT ORAL NUTRITION SUPPLEMENT; Breakfast, Lunch, Dinner; Standard 4 oz Oral Supplement  ADULT DIET; Easy to Chronicity       PSYCHOSOCIAL/SPIRITUAL SCREENING:   Palliative IDT has assessed this patient for cultural preferences / practices and a referral made as appropriate to needs (Cultural Services, Patient Advocacy, Ethics, etc.)    Spiritual Affiliation: Presbyterian    Any spiritual / Restorationist concerns:  [] Yes /  [x] No   If \"Yes\" to discuss with pastoral care during IDT     Does caregiver feel burdened by caring for their loved one:   [] Yes /  [x] No /  [] No Caregiver Present/Available [] No Caregiver [] Pt Lives at [employer]  If \"Yes\" to discuss with social work during IDT    Anticipatory grief assessment:   [x] Normal  / [] Maladaptive     If \"Maladaptive\" to discuss with social work during IDT    ESAS Anxiety: Anxiety Score: Not anxious    ESAS Depression:   unable to assess due to pt factors       LAB AND IMAGING FINDINGS:   Objective data reviewed:  labs, images, records, medication use, vitals, and chart     FINAL COMMENTS   Thank you for allowing Palliative Medicine to participate in the care of Donna Rider. .    Only check if applicable and billing time based rather than MDM  [] The total encounter time on this service date was __45__ minutes which was spent performing a face-to-face encounter and personally completing the provider-level activities documented in the note. This includes time spent prior to the visit and after the visit in direct care of the patient. This time does not include time spent in any separately reportable services.     Electronically signed by   DOMINGO Delaney - NP  Palliative Care Team  on 11/17/2023 at 10:45 AM

## 2023-11-17 NOTE — CARE COORDINATION
Transition of Care Plan:    RUR: 25% (High RUR)  Prior Level of Functioning: Assistance with the following:;Bathing;Dressing; Mobility; Shopping;Housework; Toileting;Cooking  Disposition: Home with hospice care ([facility] hospice)  If SNF or IPR: Date FOC offered: na  Date FOC received:   Accepting facility:   Date authorization started with reference number:   Date authorization received and expires: Follow up appointments: Per hospcie  DME needed: Per hospice; has cane and walker  Transportation at discharge: [facility] on Sunday 11/19 at 10:00 am  IM/IMM Medicare/ letter given: 2nd IM to be given  Is patient a  and connected with [facility]? na   If yes, was [employer] transfer form completed and VA notified? na  Caregiver Contact: Osman Dhillon (Spouse)   [phone]   Discharge Caregiver contacted prior to discharge? Will be contacted  Care Conference needed? none  Barriers to discharge: Hospice set up    CM received a call from [address] [place] [address] who has met with the family and discussed hospice care and philosophy.  provided the family with a list of hospice agencies,  emphasizing the patient/caregiver right to choose any agency/facility within network. Transport set for 10:00 AM on Sunday 11/19 per [facility] RN and family agreement. The patient lives with his spouse, daughter, and granddaughter who provide assistance. The patient has a cane and a walker.     Lamin Ruiz RN  Case Management  [phone]

## 2023-11-18 NOTE — PROGRESS NOTES
Spiritual Care Assessment/Progress Note  [facility]    Name: Emiliana Pickard MRN: [id number]    Age: 68 y.o. Sex: male   Language: English     Date: 11/17/2023            Total Time Calculated: 45 min              Spiritual Assessment begun in [facility] 2 PROGRESSIVE CARE  Service Provided For[de-identified] Patient and family together  Referral/Consult From[de-identified] Nurse  Encounter Overview/Reason : Crisis    Spiritual beliefs:      [x] Involved in a estela tradition/spiritual practice: Ольга Benitez     [] Supported by a estela community:      [] Claims no spiritual orientation:      [] Seeking spiritual identity:           [] Adheres to an individual form of spirituality:      [] Not able to assess:                Identified resources for coping and support system:   Support System: Spouse, Family members       [x] Prayer                  [x] Devotional reading               [] Music                  [] Guided Imagery     [] Pet visits                                        [] Other: (COMMENT)     Specific area/focus of visit   Encounter:  I  Crisis: Type: Emotional distress, Family Care  Spiritual/Emotional needs: Type: Spiritual Support, Emotional Distress  Ritual, Rites and Sacraments:    Grief, Loss, and Adjustments: Type: Anticipatory Grief  Ethics/Mediation:    Behavioral Health:    Palliative Care: Type: Palliative Care, Family Care  Advance Care Planning:      Plan/Referrals: Continue Support (comment)    Narrative:   Paged to attend to family of a patient in Palliative care. Patient was continuously calling out for help, so family asked for a . I responded by meeting with family (wife, nieces, children and family members in room) as they were trying to comfort patient. Patient seemed uncomfortable, but was unable to explain what he wanted or needed. I offered listening presence to family and spoke with patient's wife specifically. Prayed with family, sharing 23rd psalm, for comfort.  Family thanked me

## 2023-11-18 NOTE — PROGRESS NOTES
Spiritual Care Assessment/Progress Note  [facility]    Name: Hamida Orta MRN: [id number]    Age: 68 y.o. Sex: male   Language: English     Date: 11/18/2023            Total Time Calculated: 10 min              Spiritual Assessment begun in  [facility]  Service Provided For[de-identified] Family, Patient not available  Referral/Consult From[de-identified] Nurse  Encounter Overview/Reason : Follow-up    Spiritual beliefs:      [x] Involved in a estela tradition/spiritual practice: [facility])     [] Supported by a estela community:      [] Claims no spiritual orientation:      [] Seeking spiritual identity:           [] Adheres to an individual form of spirituality:      [] Not able to assess:                Identified resources for coping and support system:   Support System: Spouse, Family members       [x] Prayer                  [] Devotional reading               [] Music                  [] Guided Imagery     [] Pet visits                                        [x] Other: (psalms)     Specific area/focus of visit   Encounter:    Crisis: Type: Emotional distress, Family Care  Spiritual/Emotional needs: Type: Difficult news received  Ritual, Rites and Sacraments:    Grief, Loss, and Adjustments: Type: Death  Ethics/Mediation:    Behavioral Health:    Palliative Care: Type: Palliative Care, Family Care  Advance Care Planning:      Plan/Referrals: Continue Support (comment)    Narrative:   Paged regarding the death of Mr. Diann Moreau. Family is on the way to see him ([facility] They are in shock from the news and are on the way to see their loved one. (Close knit family that was hoping for the best last night.) Spoke with the nurse, Malcolm Bernheim, who will reach out to the  this morning. Mauricio Villavicencio [facility] service [phone]

## 2023-11-18 NOTE — DEATH NOTES
Death Pronouncement Note  Patient's Name: Regine Simon.    Patient's YOB: 1950  MRN Number: [id number]    Admitting Provider: Viral Adams MD  Attending Provider: Kaiden Whitaker MD    Patient was examined and the following were absent: Pulses, Blood Pressure, and Respiratory effort    I declared the patient dead on 11/18/2023 at 7:37 AM    Preliminary Cause of Death: Liver failure [facility])     Electronically signed by Kaiden Whitaker MD on 11/18/23 at 7:50 AM EST

## 2023-11-18 NOTE — PROGRESS NOTES
RN notified by telemetry that pt HR was in the 30s while getting report from the night shift RN. RN went to the bedside and patient was transitioning. RN notified the attending and the family. Patient passed at 80. Anyi Ruiz and nursing supervisor notified. Patients family at the bedside,  home is [facility]. IV and interiano removed.

## 2023-11-18 NOTE — PLAN OF CARE
Problem: Pain  Goal: Verbalizes/displays adequate comfort level or baseline comfort level  Outcome: Not Progressing  Flowsheets  Taken 11/18/2023 0550  Verbalizes/displays adequate comfort level or baseline comfort level:   Assess pain using appropriate pain scale   Administer analgesics based on type and severity of pain and evaluate response  Taken 11/18/2023 0131  Verbalizes/displays adequate comfort level or baseline comfort level:   Assess pain using appropriate pain scale   Administer analgesics based on type and severity of pain and evaluate response     Problem: Discharge Planning  Goal: Discharge to home or other facility with appropriate resources  Outcome: Progressing     Problem: Skin/Tissue Integrity  Goal: Absence of new skin breakdown  Description: 1. Monitor for areas of redness and/or skin breakdown  2. Assess vascular access sites hourly  3. Every 4-6 hours minimum:  Change oxygen saturation probe site  4. Every 4-6 hours:  If on nasal continuous positive airway pressure, respiratory therapy assess nares and determine need for appliance change or resting period.   Outcome: Progressing     Problem: Safety - Adult  Goal: Free from fall injury  Outcome: Progressing  Flowsheets (Taken 11/18/2023 0131)  Free From Fall Injury: Instruct family/caregiver on patient safety     Problem: Pain  Goal: Verbalizes/displays adequate comfort level or baseline comfort level  Outcome: Not Progressing  Flowsheets  Taken 11/18/2023 0550  Verbalizes/displays adequate comfort level or baseline comfort level:   Assess pain using appropriate pain scale   Administer analgesics based on type and severity of pain and evaluate response  Taken 11/18/2023 0131  Verbalizes/displays adequate comfort level or baseline comfort level:   Assess pain using appropriate pain scale   Administer analgesics based on type and severity of pain and evaluate response

## 2023-11-18 NOTE — PROGRESS NOTES
Pt screening, confused , not able to verbalize needs. Meds given for comfort. Family at bedside,  called to comfort pt and family. PT transferred to oncology room 3401.

## 2023-11-18 NOTE — PROGRESS NOTES
Spent time with the large family. Which included his wife , children, siblings, nieces and nephews. The  offered a prayer of comfort to family present in the room. Family members shared their memories of the patient. [address] care partner.

## 2023-11-24 NOTE — PROGRESS NOTES
Physician Progress Note      PATIENT:               Lakesha Madrid  CSN #:                  [id number]  :                       1950  ADMIT DATE:       11/3/2023 5:19 PM  [facility] DATE:        2023 2:55 PM  RESPONDING  PROVIDER #:        Jer Yanez MD          QUERY TEXT:    Pt admitted with hyperbilirubinemia. Pt noted to have hepatic steatosis,   cirrhosis and pneumonia. If possible, please document in progress notes and   discharge summary the relationship, if any, between hyperbilirubinemia,   hepatic steatosis, cirrhosis and pneumonia. The medical record reflects the following:  Risk Factors:  Risk Factors: Cirrhosis with complex ascites,    Clinical Indicators:  H&P: Hyperbilirubinemia; Hepatic steatosis; Cirrhosis with complex ascites  This likely represents progressive liver failure.  GI Consult: Patient with worsening liver function indicated by rise in   INR and T bili. not symptomatic nor suspicious for SBP and would not tap at present. Not symptomatic from standpoint of gallstones with absence of biliary colic or   n/v and therefore would not consider a surgical consult given poor operative   candidate as well. GI : Would recommend broad spectrum abx coverage given recent admission   and concern for HAP  Other infectious work up negative to date - including blood cultures and UA,   no evidence of SBP     DC Summary: discussed with GI , bili increase with stable LFTs likely   secondary to infection. No SBP. Does have possible evidence of infection on   CXR, CT chest showed small left pleural effusion likely atelectasis, subtle   bilateral upper lobe groundglass opacities. Discharged home Augmentin for 5 more days    CT findings of Geographic hepatic steatosis, cirrhosis with new complex   ascites  CXR  - concern for right lower lobe pneumonia.  - particularly HAP   pneumonia given recent admission    23 18:27 BILIRUBIN TOTAL: 9.8  23 02:19

## (undated) DEVICE — BW-412T DISP COMBO CLEANING BRUSH: Brand: SINGLE USE COMBINATION CLEANING BRUSH

## (undated) DEVICE — KIT IV STRT W CHLORAPREP PD 1ML

## (undated) DEVICE — BITE BLK ENDOSCP AD 54FR GRN POLYETH ENDOSCP W STRP SLD

## (undated) DEVICE — SYSTEM REPROC CBL 3 LD DISPOSABLE

## (undated) DEVICE — SET GRAV CK VLV NEEDLESS ST 3 GANGED 4WAY STPCOCK HI FLO 10

## (undated) DEVICE — 1200 GUARD II KIT W/5MM TUBE W/O VAC TUBE: Brand: GUARDIAN

## (undated) DEVICE — NEEDLE HYPO 18GA L1.5IN PNK S STL HUB POLYPR SHLD REG BVL

## (undated) DEVICE — SET ADMIN 16ML TBNG L100IN 2 Y INJ SITE IV PIGGY BK DISP

## (undated) DEVICE — FORCEPS BX L240CM JAW DIA2.4MM ORNG L CAP W/ NDL DISP RAD

## (undated) DEVICE — BAG SPEC BIOHZD LF 2MIL 6X10IN -- CONVERT TO ITEM 357326

## (undated) DEVICE — CONTAINER SPEC 20 ML LID NEUT BUFF FORMALIN 10 % POLYPR STS

## (undated) DEVICE — ENDO CARRY-ON PROCEDURE KIT INCLUDES ENZYMATIC SPONGE, GAUZE, BIOHAZARD LABEL, TRAY, LUBRICANT, DIRTY SCOPE LABEL, WATER LABEL, TRAY, DRAWSTRING PAD, AND DEFENDO 4-PIECE KIT.: Brand: ENDO CARRY-ON PROCEDURE KIT

## (undated) DEVICE — KENDALL RADIOLUCENT FOAM MONITORING ELECTRODE -RECTANGULAR SHAPE: Brand: KENDALL

## (undated) DEVICE — Z DISCONTINUED NO SUB IDED SET EXTN W/ 4 W STPCOCK M SPIN LOK 36IN

## (undated) DEVICE — SYRINGE MED 20ML STD CLR PLAS LUERLOCK TIP N CTRL DISP

## (undated) DEVICE — SNARE ENDOSCP L240CM LOOP W27MM SHTH DIA2.4MM WRK CHN 2.8MM

## (undated) DEVICE — BIPOLAR ELECTROHEMOSTASIS CATHETER: Brand: INJECTION GOLD PROBE

## (undated) DEVICE — CANN NASAL O2 CAPNOGRAPHY AD -- FILTERLINE

## (undated) DEVICE — SOLIDIFIER FLUID 3000 CC ABSORB

## (undated) DEVICE — CATH IV AUTOGRD BC BLU 22GA 25 -- INSYTE

## (undated) DEVICE — FORCEPS BX L240CM JAW DIA2.8MM L CAP W/ NDL MIC MESH TOOTH

## (undated) DEVICE — BAG BELONG PT PERS CLEAR HANDL